# Patient Record
Sex: FEMALE | Race: WHITE
[De-identification: names, ages, dates, MRNs, and addresses within clinical notes are randomized per-mention and may not be internally consistent; named-entity substitution may affect disease eponyms.]

---

## 2017-06-25 ENCOUNTER — HOSPITAL ENCOUNTER (EMERGENCY)
Dept: HOSPITAL 83 - ED | Age: 82
Discharge: HOME | End: 2017-06-25
Payer: MEDICARE

## 2017-06-25 VITALS — DIASTOLIC BLOOD PRESSURE: 62 MMHG

## 2017-06-25 VITALS — HEIGHT: 65 IN | WEIGHT: 185 LBS | BODY MASS INDEX: 30.82 KG/M2

## 2017-06-25 DIAGNOSIS — G89.29: Primary | ICD-10-CM

## 2017-06-25 DIAGNOSIS — I10: ICD-10-CM

## 2017-06-25 DIAGNOSIS — M54.5: ICD-10-CM

## 2017-06-25 DIAGNOSIS — Z79.82: ICD-10-CM

## 2017-06-25 DIAGNOSIS — E78.5: ICD-10-CM

## 2017-06-25 DIAGNOSIS — N39.0: ICD-10-CM

## 2017-06-25 DIAGNOSIS — I71.4: ICD-10-CM

## 2017-06-25 DIAGNOSIS — Z79.899: ICD-10-CM

## 2017-06-25 DIAGNOSIS — R31.9: ICD-10-CM

## 2017-06-25 LAB
ALBUMIN SERPL-MCNC: (no result) G/DL
APPEARANCE UR: (no result)
BACTERIA #/AREA URNS HPF: (no result) /[HPF]
BILIRUB UR QL STRIP: NEGATIVE
COLOR UR: YELLOW
GLUCOSE UR QL: NEGATIVE
HGB UR QL STRIP: (no result)
KETONES UR QL STRIP: NEGATIVE
LEUKOCYTE ESTERASE UR QL STRIP: (no result)
NITRITE UR QL STRIP: NEGATIVE
PH UR STRIP: 5.5 [PH] (ref 5–9)
RBC #/AREA URNS HPF: (no result) RBC/HPF (ref 0–2)
SP GR UR: 1.02 (ref 1–1.03)
URINE REFLEX COMMENT: YES
UROBILINOGEN UR STRIP-MCNC: 1 E.U./DL (ref 0.2–1)
WBC #/AREA URNS HPF: (no result) WBC/HPF (ref 0–5)

## 2017-07-20 ENCOUNTER — HOSPITAL ENCOUNTER (EMERGENCY)
Dept: HOSPITAL 83 - ED | Age: 82
Discharge: HOME | End: 2017-07-20
Payer: MEDICARE

## 2017-07-20 VITALS — SYSTOLIC BLOOD PRESSURE: 145 MMHG | DIASTOLIC BLOOD PRESSURE: 89 MMHG

## 2017-07-20 DIAGNOSIS — I10: ICD-10-CM

## 2017-07-20 DIAGNOSIS — Z79.82: ICD-10-CM

## 2017-07-20 DIAGNOSIS — Z79.899: ICD-10-CM

## 2017-07-20 DIAGNOSIS — E78.5: ICD-10-CM

## 2017-07-20 DIAGNOSIS — M48.54XA: Primary | ICD-10-CM

## 2017-08-24 ENCOUNTER — HOSPITAL ENCOUNTER (OUTPATIENT)
Dept: HOSPITAL 83 - MRI | Age: 82
Discharge: HOME | End: 2017-08-24
Attending: INTERNAL MEDICINE
Payer: MEDICARE

## 2017-08-24 DIAGNOSIS — S22.000D: Primary | ICD-10-CM

## 2017-08-24 DIAGNOSIS — M47.894: ICD-10-CM
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
## 2017-08-24 DIAGNOSIS — M48.04: ICD-10-CM

## 2017-11-04 ENCOUNTER — HOSPITAL ENCOUNTER (EMERGENCY)
Dept: HOSPITAL 83 - ED | Age: 82
Discharge: HOME | End: 2017-11-04
Payer: MEDICARE

## 2017-11-04 VITALS — SYSTOLIC BLOOD PRESSURE: 162 MMHG | DIASTOLIC BLOOD PRESSURE: 80 MMHG

## 2017-11-04 VITALS — WEIGHT: 180 LBS | HEIGHT: 55 IN

## 2017-11-04 DIAGNOSIS — S32.019A: Primary | ICD-10-CM
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
## 2017-11-08 ENCOUNTER — HOSPITAL ENCOUNTER (OUTPATIENT)
Dept: HOSPITAL 83 - MRI | Age: 82
Discharge: HOME | End: 2017-11-08
Attending: NEUROLOGICAL SURGERY
Payer: MEDICARE
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
## 2017-11-08 DIAGNOSIS — S22.080A: ICD-10-CM

## 2017-11-08 DIAGNOSIS — S32.010A: Primary | ICD-10-CM
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
## 2017-11-09 ENCOUNTER — HOSPITAL ENCOUNTER (EMERGENCY)
Dept: HOSPITAL 83 - ED | Age: 82
Discharge: HOME | End: 2017-11-09
Payer: MEDICARE

## 2017-11-09 VITALS — BODY MASS INDEX: 29.99 KG/M2 | WEIGHT: 180 LBS | HEIGHT: 65 IN

## 2017-11-09 VITALS — DIASTOLIC BLOOD PRESSURE: 72 MMHG

## 2017-11-09 DIAGNOSIS — G89.29: Primary | ICD-10-CM

## 2017-11-09 DIAGNOSIS — Z79.82: ICD-10-CM

## 2017-11-09 DIAGNOSIS — Z90.710: ICD-10-CM

## 2017-11-09 DIAGNOSIS — Z79.899: ICD-10-CM

## 2017-11-09 DIAGNOSIS — M54.5: ICD-10-CM

## 2017-11-09 DIAGNOSIS — Z98.890: ICD-10-CM

## 2017-12-31 ENCOUNTER — HOSPITAL ENCOUNTER (EMERGENCY)
Dept: HOSPITAL 83 - ED | Age: 82
Discharge: HOME | End: 2017-12-31
Payer: MEDICARE

## 2017-12-31 VITALS — SYSTOLIC BLOOD PRESSURE: 162 MMHG | DIASTOLIC BLOOD PRESSURE: 71 MMHG

## 2017-12-31 VITALS — WEIGHT: 180 LBS | HEIGHT: 65 IN | BODY MASS INDEX: 29.99 KG/M2

## 2017-12-31 DIAGNOSIS — J20.9: Primary | ICD-10-CM

## 2017-12-31 DIAGNOSIS — Z79.899: ICD-10-CM

## 2017-12-31 DIAGNOSIS — Z79.82: ICD-10-CM

## 2017-12-31 DIAGNOSIS — I10: ICD-10-CM

## 2017-12-31 DIAGNOSIS — G89.29: ICD-10-CM

## 2017-12-31 DIAGNOSIS — N39.0: ICD-10-CM

## 2017-12-31 DIAGNOSIS — E78.5: ICD-10-CM

## 2017-12-31 DIAGNOSIS — E87.6: ICD-10-CM

## 2017-12-31 DIAGNOSIS — F32.9: ICD-10-CM

## 2017-12-31 LAB
ALBUMIN SERPL-MCNC: 2.9 GM/DL (ref 3.1–4.5)
ALP SERPL-CCNC: 51 U/L (ref 45–117)
ALT SERPL W P-5'-P-CCNC: 14 U/L (ref 12–78)
APPEARANCE UR: (no result)
APTT PPP: 29.4 SECONDS (ref 20.8–31.5)
AST SERPL-CCNC: 18 IU/L (ref 3–35)
BACTERIA #/AREA URNS HPF: (no result) /[HPF]
BILIRUB UR QL STRIP: (no result)
BUN SERPL-MCNC: 16 MG/DL (ref 7–24)
CHLORIDE SERPL-SCNC: 105 MMOL/L (ref 98–107)
COLOR UR: YELLOW
CREAT SERPL-MCNC: 0.68 MG/DL (ref 0.55–1.02)
ERYTHROCYTE [DISTWIDTH] IN BLOOD BY AUTOMATED COUNT: 13.2 % (ref 0–14.5)
GLUCOSE UR QL: NEGATIVE
HCT VFR BLD AUTO: 39.6 % (ref 37–47)
HGB BLD-MCNC: 13 G/DL (ref 12–16)
HGB UR QL STRIP: (no result)
INR BLD: 1.8 (ref 2–3.5)
KETONES UR QL STRIP: (no result)
LEUKOCYTE ESTERASE UR QL STRIP: (no result)
LIPASE SERPL-CCNC: 51 U/L (ref 73–393)
MACROCYTES BLD QL SMEAR: SLIGHT
MCH RBC QN AUTO: 32.7 PG (ref 27–31)
MCHC RBC AUTO-ENTMCNC: 32.8 G/DL (ref 33–37)
MCV RBC AUTO: 99.7 FL (ref 81–99)
MUCOUS THREADS URNS QL MICRO: (no result)
NITRITE UR QL STRIP: NEGATIVE
NRBC BLD QL AUTO: 0 10*3/UL (ref 0–0)
OVALOCYTES BLD QL SMEAR: (no result)
PH UR STRIP: 5.5 [PH] (ref 5–9)
PLATELET # BLD AUTO: 260 10*3/UL (ref 130–400)
PLATELET SUFFICIENCY: NORMAL
PMV BLD AUTO: 10.1 FL (ref 9.6–12.3)
POTASSIUM SERPL-SCNC: 3.2 MMOL/L (ref 3.5–5.1)
PROT SERPL-MCNC: 7.3 GM/DL (ref 6.4–8.2)
RBC # BLD AUTO: 3.97 10*6/UL (ref 4.1–5.1)
RBC #/AREA URNS HPF: (no result) RBC/HPF (ref 0–2)
SODIUM SERPL-SCNC: 141 MMOL/L (ref 136–145)
SP GR UR: 1.02 (ref 1–1.03)
T4 FREE SERPL-MCNC: 1.3 NG/DL (ref 0.76–1.46)
TOTAL CELLS COUNTED: 100 #CELLS
TROPONIN I SERPL-MCNC: 0.02 NG/ML (ref ?–0.04)
TSH SERPL DL<=0.005 MIU/L-ACNC: 1.34 UIU/ML (ref 0.36–4.75)
UROBILINOGEN UR STRIP-MCNC: 1 E.U./DL (ref 0.2–1)
VITAMIN B12: 1692 PG/ML (ref 247–911)
WBC #/AREA URNS HPF: (no result) WBC/HPF (ref 0–5)
WBC NRBC COR # BLD AUTO: 8.7 10*3/UL (ref 4.8–10.8)

## 2018-05-19 ENCOUNTER — HOSPITAL ENCOUNTER (EMERGENCY)
Dept: HOSPITAL 83 - ED | Age: 83
Discharge: HOME | End: 2018-05-19
Payer: MEDICARE

## 2018-05-19 VITALS — DIASTOLIC BLOOD PRESSURE: 95 MMHG

## 2018-05-19 VITALS — WEIGHT: 165 LBS | HEIGHT: 65 IN | BODY MASS INDEX: 27.49 KG/M2

## 2018-05-19 DIAGNOSIS — Z79.82: ICD-10-CM

## 2018-05-19 DIAGNOSIS — Z79.899: ICD-10-CM

## 2018-05-19 DIAGNOSIS — Z98.890: ICD-10-CM

## 2018-05-19 DIAGNOSIS — Z90.710: ICD-10-CM

## 2018-05-19 DIAGNOSIS — R31.9: ICD-10-CM

## 2018-05-19 DIAGNOSIS — N39.0: Primary | ICD-10-CM

## 2018-05-19 DIAGNOSIS — R03.0: ICD-10-CM

## 2018-05-19 LAB
APPEARANCE UR: (no result)
BILIRUB UR QL STRIP: NEGATIVE
COLOR UR: YELLOW
GLUCOSE UR QL: NEGATIVE
HGB UR QL STRIP: (no result)
KETONES UR QL STRIP: (no result)
LEUKOCYTE ESTERASE UR QL STRIP: (no result)
NITRITE UR QL STRIP: POSITIVE
PH UR STRIP: 5.5 [PH] (ref 5–9)
SP GR UR: 1.02 (ref 1–1.03)
UROBILINOGEN UR STRIP-MCNC: 1 E.U./DL (ref 0.2–1)
WBC #/AREA URNS HPF: (no result) WBC/HPF (ref 0–5)

## 2018-09-06 ENCOUNTER — OFFICE VISIT (OUTPATIENT)
Dept: CARDIOLOGY CLINIC | Age: 83
End: 2018-09-06
Payer: MEDICARE

## 2018-09-06 VITALS
RESPIRATION RATE: 14 BRPM | BODY MASS INDEX: 27.66 KG/M2 | WEIGHT: 166 LBS | DIASTOLIC BLOOD PRESSURE: 68 MMHG | HEIGHT: 65 IN | SYSTOLIC BLOOD PRESSURE: 108 MMHG | HEART RATE: 60 BPM

## 2018-09-06 DIAGNOSIS — I25.10 CORONARY ARTERY DISEASE INVOLVING NATIVE CORONARY ARTERY OF NATIVE HEART WITHOUT ANGINA PECTORIS: ICD-10-CM

## 2018-09-06 DIAGNOSIS — I50.42 CHRONIC COMBINED SYSTOLIC AND DIASTOLIC CHF (CONGESTIVE HEART FAILURE) (HCC): Primary | ICD-10-CM

## 2018-09-06 PROCEDURE — 1123F ACP DISCUSS/DSCN MKR DOCD: CPT | Performed by: INTERNAL MEDICINE

## 2018-09-06 PROCEDURE — G8419 CALC BMI OUT NRM PARAM NOF/U: HCPCS | Performed by: INTERNAL MEDICINE

## 2018-09-06 PROCEDURE — 1090F PRES/ABSN URINE INCON ASSESS: CPT | Performed by: INTERNAL MEDICINE

## 2018-09-06 PROCEDURE — 1036F TOBACCO NON-USER: CPT | Performed by: INTERNAL MEDICINE

## 2018-09-06 PROCEDURE — 1101F PT FALLS ASSESS-DOCD LE1/YR: CPT | Performed by: INTERNAL MEDICINE

## 2018-09-06 PROCEDURE — G8427 DOCREV CUR MEDS BY ELIG CLIN: HCPCS | Performed by: INTERNAL MEDICINE

## 2018-09-06 PROCEDURE — 4040F PNEUMOC VAC/ADMIN/RCVD: CPT | Performed by: INTERNAL MEDICINE

## 2018-09-06 PROCEDURE — 99214 OFFICE O/P EST MOD 30 MIN: CPT | Performed by: INTERNAL MEDICINE

## 2018-09-06 PROCEDURE — 93000 ELECTROCARDIOGRAM COMPLETE: CPT | Performed by: INTERNAL MEDICINE

## 2018-09-06 PROCEDURE — G8598 ASA/ANTIPLAT THER USED: HCPCS | Performed by: INTERNAL MEDICINE

## 2018-09-06 NOTE — PROGRESS NOTES
10/2/2017.     Review of Systems:  HEENT: negative for acute visual and auditory problems  Constitutional: patient gets fatigued easily, negative for fever and chills  Respiratory: dyspnea with minimal exertion and easy fatigue, negative for cough and hemoptysis  Cardiovascular: negative for chest pain and dyspnea  Gastrointestinal: negative for abdominal pain, diarrhea, nausea and vomiting  Genitourinary: negative for dysuria and hematuria  Derm: negative for rash and skin lesion(s)  Neurological: negative for seizures and tremors  Endocrine: negative for diabetic symptoms including polydipsia and polyuria  Musculoskeletal: negative for CTD, positive for chronic back and leg pain  Psychiatric: negative for anxiety and major depression.     The remainder of the review of systems is negative except as noted above. On exam, she is an elderly white female who is awake, alert and oriented. P: 60 and regular. BP: 108/68. Wt. 166 lbs. BMI: 27.6. Her weight is stable. HEENT is normocephalic and atraumatic. Extraocular muscles are intact. Sclerae are clear. Pupils are equal, round and react to light. The oral mucosa is moist.  Tongue is midline. Her neck is supple. She has no jugular distention. Carotids are full. There are no bruits. She has no neck or supraclavicular masses and no thyromegaly. Respirations are unlabored. Her chest is clear to auscultation and percussion. There was no presacral edema or chest wall tenderness. Her heart had a regular rate and rhythm with a 4th heart sound, but no 3rd heart sound or significant murmur. The PMI was not displaced. There was no precordial heave, lift or thrill. Her abdomen is obese, but otherwise benign without masses, organomegaly or bruits. Extremities showed no edema. Peripheral pulses were palpable in the feet bilaterally. I reviewed her electrocardiogram, which showed normal sinus rhythm at a rate of 60.   She has LVH with secondary ST

## 2019-01-23 ENCOUNTER — HOSPITAL ENCOUNTER (OUTPATIENT)
Dept: GENERAL RADIOLOGY | Age: 84
Discharge: HOME | End: 2019-01-25
Payer: MEDICARE

## 2019-01-23 DIAGNOSIS — N64.52 NIPPLE DISCHARGE: ICD-10-CM

## 2019-01-23 PROCEDURE — 76642 ULTRASOUND BREAST LIMITED: CPT

## 2019-01-23 PROCEDURE — 77066 DX MAMMO INCL CAD BI: CPT

## 2019-02-08 ASSESSMENT — ENCOUNTER SYMPTOMS
SINUS PAIN: 0
TROUBLE SWALLOWING: 0
CHEST TIGHTNESS: 0
SORE THROAT: 0
COUGH: 0
EYE ITCHING: 0
BACK PAIN: 0
SINUS PRESSURE: 0
ABDOMINAL PAIN: 0
ABDOMINAL DISTENTION: 0
VOMITING: 0
DIARRHEA: 0
CHOKING: 0
BLOOD IN STOOL: 0
NAUSEA: 0
WHEEZING: 0
EYE DISCHARGE: 0
VOICE CHANGE: 0
RHINORRHEA: 0
CONSTIPATION: 0

## 2019-02-11 ENCOUNTER — OFFICE VISIT (OUTPATIENT)
Dept: BREAST CENTER | Age: 84
End: 2019-02-11
Payer: MEDICARE

## 2019-02-11 VITALS
HEART RATE: 81 BPM | OXYGEN SATURATION: 97 % | DIASTOLIC BLOOD PRESSURE: 100 MMHG | BODY MASS INDEX: 27.16 KG/M2 | RESPIRATION RATE: 16 BRPM | WEIGHT: 163 LBS | SYSTOLIC BLOOD PRESSURE: 172 MMHG | HEIGHT: 65 IN | TEMPERATURE: 97.9 F

## 2019-02-11 DIAGNOSIS — N64.52 DISCHARGE FROM NIPPLE: ICD-10-CM

## 2019-02-11 PROCEDURE — 1123F ACP DISCUSS/DSCN MKR DOCD: CPT | Performed by: SURGERY

## 2019-02-11 PROCEDURE — 99203 OFFICE O/P NEW LOW 30 MIN: CPT | Performed by: SURGERY

## 2019-02-11 PROCEDURE — 1101F PT FALLS ASSESS-DOCD LE1/YR: CPT | Performed by: SURGERY

## 2019-02-11 PROCEDURE — G8598 ASA/ANTIPLAT THER USED: HCPCS | Performed by: SURGERY

## 2019-02-11 PROCEDURE — G8419 CALC BMI OUT NRM PARAM NOF/U: HCPCS | Performed by: SURGERY

## 2019-02-11 PROCEDURE — G8484 FLU IMMUNIZE NO ADMIN: HCPCS | Performed by: SURGERY

## 2019-02-11 PROCEDURE — 1036F TOBACCO NON-USER: CPT | Performed by: SURGERY

## 2019-02-11 PROCEDURE — G8427 DOCREV CUR MEDS BY ELIG CLIN: HCPCS | Performed by: SURGERY

## 2019-02-11 PROCEDURE — 1090F PRES/ABSN URINE INCON ASSESS: CPT | Performed by: SURGERY

## 2019-02-11 PROCEDURE — 4040F PNEUMOC VAC/ADMIN/RCVD: CPT | Performed by: SURGERY

## 2019-02-11 RX ORDER — NYSTATIN 100000 [USP'U]/G
POWDER TOPICAL
Qty: 60 G | Refills: 3 | Status: SHIPPED | OUTPATIENT
Start: 2019-02-11 | End: 2019-04-30

## 2019-02-11 ASSESSMENT — ENCOUNTER SYMPTOMS: SHORTNESS OF BREATH: 1

## 2019-03-05 RX ORDER — WARFARIN SODIUM 5 MG/1
TABLET ORAL
Refills: 5 | COMMUNITY
Start: 2019-01-17 | End: 2019-09-16 | Stop reason: SDUPTHER

## 2019-03-05 RX ORDER — KETOCONAZOLE 20 MG/G
CREAM TOPICAL
Refills: 0 | COMMUNITY
Start: 2019-01-17 | End: 2019-04-30

## 2019-04-30 ENCOUNTER — OFFICE VISIT (OUTPATIENT)
Dept: CARDIOLOGY CLINIC | Age: 84
End: 2019-04-30
Payer: MEDICARE

## 2019-04-30 VITALS
DIASTOLIC BLOOD PRESSURE: 72 MMHG | HEIGHT: 65 IN | BODY MASS INDEX: 25.99 KG/M2 | RESPIRATION RATE: 18 BRPM | SYSTOLIC BLOOD PRESSURE: 128 MMHG | HEART RATE: 60 BPM | WEIGHT: 156 LBS

## 2019-04-30 DIAGNOSIS — I10 HYPERTENSION, UNSPECIFIED TYPE: Primary | ICD-10-CM

## 2019-04-30 PROCEDURE — G8419 CALC BMI OUT NRM PARAM NOF/U: HCPCS | Performed by: INTERNAL MEDICINE

## 2019-04-30 PROCEDURE — G8598 ASA/ANTIPLAT THER USED: HCPCS | Performed by: INTERNAL MEDICINE

## 2019-04-30 PROCEDURE — 1123F ACP DISCUSS/DSCN MKR DOCD: CPT | Performed by: INTERNAL MEDICINE

## 2019-04-30 PROCEDURE — 4040F PNEUMOC VAC/ADMIN/RCVD: CPT | Performed by: INTERNAL MEDICINE

## 2019-04-30 PROCEDURE — 99214 OFFICE O/P EST MOD 30 MIN: CPT | Performed by: INTERNAL MEDICINE

## 2019-04-30 PROCEDURE — G8427 DOCREV CUR MEDS BY ELIG CLIN: HCPCS | Performed by: INTERNAL MEDICINE

## 2019-04-30 PROCEDURE — 1036F TOBACCO NON-USER: CPT | Performed by: INTERNAL MEDICINE

## 2019-04-30 PROCEDURE — 1090F PRES/ABSN URINE INCON ASSESS: CPT | Performed by: INTERNAL MEDICINE

## 2019-04-30 PROCEDURE — 93000 ELECTROCARDIOGRAM COMPLETE: CPT | Performed by: INTERNAL MEDICINE

## 2019-04-30 RX ORDER — OXYCODONE AND ACETAMINOPHEN 10; 325 MG/1; MG/1
1 TABLET ORAL EVERY 4 HOURS PRN
COMMUNITY
End: 2019-05-29 | Stop reason: SDUPTHER

## 2019-04-30 RX ORDER — OXYCODONE AND ACETAMINOPHEN 10; 325 MG/1; MG/1
1 TABLET ORAL EVERY 4 HOURS PRN
COMMUNITY
End: 2019-04-30 | Stop reason: SDUPTHER

## 2019-04-30 NOTE — PROGRESS NOTES
CHIEF COMPLAINT: CHF    HISTORY OF PRESENT ILLNESS: Patient is a 80 y.o. female seen at the request of Sage Padilla MD.      Baseline SOB. No CP. Medical history includes:   1. CHF due to LV systolic and diastolic dysfunction. 2. Essential hypertension. 3. Hyperlipidemia. 4. Small abdominal aortic aneurysm documented by CT scan, 02/25/2013, measuring approximately 3 cm in diameter.    5. 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 6. Holter monitor, 10/22/2015. Sinus bradycardia. Occasional ventricular and supraventricular ectopy, but no AF documented.    7. TENISHA, 11/04/2015. Global LV hypokinesis with mild systolic dysfunction. EF 40-45%. Stage II diastolic dysfunction. Dilated LA with spontaneous echo contrast within the atrial cavity and a thrombus within the left atrial appendage. MAC with mild mitral insufficiency. Aortic sclerosis without stenosis, but with mild aortic insufficiency. Mild tricuspid insufficiency with Doppler evidence for severe elevation in RV systolic pressures. Moderate atherosclerosis of the thoracic aorta, but no mobile atheromata present.    8. Holter monitor, [facility], 02/23/2016, sinus rhythm with frequent isolated PVC's. Average heart rate 64 with heart rate varying from 51 to 82 beats per minute. No prolonged pauses. No symptoms listed. 9. Kyphoplasty by Dr. Andi Smart, 10/2/2017.     Past Medical History:   Diagnosis Date    AAA (abdominal aortic aneurysm) ([address]     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)     CAD (coronary artery disease)     CHF (congestive heart failure) (HCC)     HTN (hypertension)     Hx of blood clots 2015    right upper arm    Hyperlipemia        Patient Active Problem List   Diagnosis    CHF (congestive heart failure) ([address]    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 ([address]    HTN (hypertension)    Hyperlipemia    AAA (abdominal aortic aneurysm) ([address]    CAD (coronary artery disease)    T12 compression fracture (HCC)    Compression fracture of body of thoracic vertebra (HCC)       No Known Allergies    Current Outpatient Medications   Medication Sig Dispense Refill    warfarin (COUMADIN) 5 MG tablet TAKE AS DIRECTED BY YOUR PRESCRIBER COUMADIN  5    oxyCODONE-acetaminophen (PERCOCET) 5-325 MG per tablet Take 1 tablet by mouth every 4 hours as needed for Pain  Take morning of surgery with a sip of water. 120 tablet 0    acetaminophen (TYLENOL ARTHRITIS PAIN) 650 MG extended release tablet Take 650 mg by mouth every 8 hours as needed for Pain      potassium chloride (KLOR-CON) 20 MEQ packet Take 20 mEq by mouth 2 times daily      lisinopril (PRINIVIL;ZESTRIL) 10 MG tablet Take 1 tablet by mouth every evening (Patient taking differently: Take 10 mg by mouth every evening Take morning of surgery with a sip of water) 30 tablet 3    atorvastatin (LIPITOR) 40 MG tablet Take 40 mg by mouth daily       vitamin B-12 (CYANOCOBALAMIN) 500 MCG tablet Take 500 mcg by mouth daily Ld 9-12-17      furosemide (LASIX) 20 MG tablet Take 20 mg by mouth See Admin Instructions       isosorbide mononitrate (IMDUR) 30 MG CR tablet Take 30 mg by mouth daily Take morning of surgery with a sip of water       No current facility-administered medications for this visit.         Social History     Socioeconomic History    Marital status:      Spouse name: Not on file    Number of children: Not on file    Years of education: Not on file    Highest education level: Not on file   Occupational History    Not on file   Social Needs    Financial resource strain: Not on file    Food insecurity:     Worry: Not on file     Inability: Not on file    Transportation needs:     Medical: Not on file     Non-medical: Not on file   Tobacco Use    Smoking status: Former Smoker    Smokeless tobacco: Never Used    Tobacco comment: quit about 30 yrs ago   Substance and Sexual Activity    distal pulses. Exam reveals no gallop and no friction rub. No murmur heard. Pulmonary/Chest: Effort normal and breath sounds normal. No respiratory distress. No wheezes. No rales. No tenderness. Abdominal: Soft. Bowel sounds are normal. No distension and no mass. No tenderness. No rebound and no guarding. Musculoskeletal: Normal range of motion. No edema and no tenderness. Lymphadenopathy:   No cervical adenopathy. No groin adenopathy. Neurological: Alert and oriented to person, place, and time. Skin: Skin is warm and dry. No rash noted. Not diaphoretic. No erythema. Psychiatric: Normal mood and affect. Behavior is normal.     EKG:  normal sinus rhythm, nonspecific ST and T waves changes. ASSESSMENT AND PLAN:  Patient Active Problem List   Diagnosis    CHF (congestive heart failure) ([address]    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)    HTN (hypertension)    Hyperlipemia    AAA (abdominal aortic aneurysm) ([address]    CAD (coronary artery disease)    T12 compression fracture ([address]    Compression fracture of body of thoracic vertebra ([address]     1. CHF: ACEI/lasix/imdur. 2. HTN: Observe. 3. Lipids: Statin. 4. Hx of arterial embolism    Son Iyer D.O.   Cardiologist  Cardiology, [facility]

## 2019-05-29 ENCOUNTER — ANTI-COAG VISIT (OUTPATIENT)
Dept: FAMILY MEDICINE CLINIC | Age: 84
End: 2019-05-29

## 2019-05-29 ENCOUNTER — HOSPITAL ENCOUNTER (OUTPATIENT)
Age: 84
Discharge: HOME | End: 2019-05-31
Payer: MEDICARE

## 2019-05-29 ENCOUNTER — OFFICE VISIT (OUTPATIENT)
Dept: FAMILY MEDICINE CLINIC | Age: 84
End: 2019-05-29
Payer: MEDICARE

## 2019-05-29 VITALS
HEIGHT: 65 IN | BODY MASS INDEX: 24.83 KG/M2 | SYSTOLIC BLOOD PRESSURE: 126 MMHG | WEIGHT: 149 LBS | DIASTOLIC BLOOD PRESSURE: 66 MMHG

## 2019-05-29 DIAGNOSIS — E78.5 HYPERLIPIDEMIA, UNSPECIFIED HYPERLIPIDEMIA TYPE: ICD-10-CM

## 2019-05-29 DIAGNOSIS — Z79.01 ANTICOAGULANT LONG-TERM USE: ICD-10-CM

## 2019-05-29 DIAGNOSIS — I10 ESSENTIAL HYPERTENSION: ICD-10-CM

## 2019-05-29 DIAGNOSIS — Z79.01 ANTICOAGULATED ON COUMADIN: Primary | ICD-10-CM

## 2019-05-29 DIAGNOSIS — G89.4 CHRONIC PAIN SYNDROME: ICD-10-CM

## 2019-05-29 LAB
ALBUMIN SERPL-MCNC: 4 G/DL (ref 3.5–5.2)
ALP BLD-CCNC: 61 U/L (ref 35–104)
ALT SERPL-CCNC: 17 U/L (ref 0–32)
ANION GAP SERPL CALCULATED.3IONS-SCNC: 14 MMOL/L (ref 7–16)
AST SERPL-CCNC: 26 U/L (ref 0–31)
BASOPHILS ABSOLUTE: 0.05 E9/L (ref 0–0.2)
BASOPHILS RELATIVE PERCENT: 0.5 % (ref 0–2)
BILIRUB SERPL-MCNC: 0.7 MG/DL (ref 0–1.2)
BUN BLDV-MCNC: 20 MG/DL (ref 8–23)
CALCIUM SERPL-MCNC: 10 MG/DL (ref 8.6–10.2)
CHLORIDE BLD-SCNC: 104 MMOL/L (ref 98–107)
CHOLESTEROL, TOTAL: 136 MG/DL (ref 0–199)
CO2: 25 MMOL/L (ref 22–29)
CREAT SERPL-MCNC: 0.8 MG/DL (ref 0.5–1)
EOSINOPHILS ABSOLUTE: 0.1 E9/L (ref 0.05–0.5)
EOSINOPHILS RELATIVE PERCENT: 1 % (ref 0–6)
GFR AFRICAN AMERICAN: >60
GFR NON-AFRICAN AMERICAN: >60 ML/MIN/1.73
GLUCOSE BLD-MCNC: 96 MG/DL (ref 74–99)
HCT VFR BLD CALC: 47.2 % (ref 34–48)
HDLC SERPL-MCNC: 39 MG/DL
HEMOGLOBIN: 14.5 G/DL (ref 11.5–15.5)
IMMATURE GRANULOCYTES #: 0.06 E9/L
IMMATURE GRANULOCYTES %: 0.6 % (ref 0–5)
INTERNATIONAL NORMALIZATION RATIO, POC: 2.2
LDL CHOLESTEROL CALCULATED: 68 MG/DL (ref 0–99)
LYMPHOCYTES ABSOLUTE: 2.14 E9/L (ref 1.5–4)
LYMPHOCYTES RELATIVE PERCENT: 21.5 % (ref 20–42)
MCH RBC QN AUTO: 32.9 PG (ref 26–35)
MCHC RBC AUTO-ENTMCNC: 30.7 % (ref 32–34.5)
MCV RBC AUTO: 107 FL (ref 80–99.9)
MONOCYTES ABSOLUTE: 0.77 E9/L (ref 0.1–0.95)
MONOCYTES RELATIVE PERCENT: 7.7 % (ref 2–12)
NEUTROPHILS ABSOLUTE: 6.83 E9/L (ref 1.8–7.3)
NEUTROPHILS RELATIVE PERCENT: 68.7 % (ref 43–80)
PDW BLD-RTO: 14 FL (ref 11.5–15)
PLATELET # BLD: 226 E9/L (ref 130–450)
PMV BLD AUTO: 11.6 FL (ref 7–12)
POTASSIUM SERPL-SCNC: 5 MMOL/L (ref 3.5–5)
PROTHROMBIN TIME, POC: 26.2
RBC # BLD: 4.41 E12/L (ref 3.5–5.5)
SODIUM BLD-SCNC: 143 MMOL/L (ref 132–146)
TOTAL PROTEIN: 7.9 G/DL (ref 6.4–8.3)
TRIGL SERPL-MCNC: 146 MG/DL (ref 0–149)
VLDLC SERPL CALC-MCNC: 29 MG/DL
WBC # BLD: 10 E9/L (ref 4.5–11.5)

## 2019-05-29 PROCEDURE — 99214 OFFICE O/P EST MOD 30 MIN: CPT | Performed by: INTERNAL MEDICINE

## 2019-05-29 PROCEDURE — 80061 LIPID PANEL: CPT

## 2019-05-29 PROCEDURE — 85610 PROTHROMBIN TIME: CPT | Performed by: INTERNAL MEDICINE

## 2019-05-29 PROCEDURE — 80053 COMPREHEN METABOLIC PANEL: CPT

## 2019-05-29 PROCEDURE — 36415 COLL VENOUS BLD VENIPUNCTURE: CPT

## 2019-05-29 PROCEDURE — 85025 COMPLETE CBC W/AUTO DIFF WBC: CPT

## 2019-05-29 RX ORDER — OXYCODONE AND ACETAMINOPHEN 10; 325 MG/1; MG/1
1 TABLET ORAL EVERY 8 HOURS PRN
Qty: 90 TABLET | Refills: 0 | Status: SHIPPED | OUTPATIENT
Start: 2019-05-29 | End: 2019-06-27 | Stop reason: SDUPTHER

## 2019-05-29 ASSESSMENT — ENCOUNTER SYMPTOMS
COUGH: 0
DIARRHEA: 0
WHEEZING: 0
RHINORRHEA: 0
VOMITING: 0
CONSTIPATION: 1
SHORTNESS OF BREATH: 1
BLOOD IN STOOL: 0
SINUS PAIN: 0
NAUSEA: 0
ABDOMINAL PAIN: 0
BACK PAIN: 0

## 2019-05-31 ENCOUNTER — TELEPHONE (OUTPATIENT)
Dept: FAMILY MEDICINE CLINIC | Age: 84
End: 2019-05-31

## 2019-06-05 ENCOUNTER — TELEPHONE (OUTPATIENT)
Dept: FAMILY MEDICINE CLINIC | Age: 84
End: 2019-06-05

## 2019-06-27 ENCOUNTER — TELEPHONE (OUTPATIENT)
Dept: FAMILY MEDICINE CLINIC | Age: 84
End: 2019-06-27

## 2019-06-27 ENCOUNTER — OFFICE VISIT (OUTPATIENT)
Dept: FAMILY MEDICINE CLINIC | Age: 84
End: 2019-06-27
Payer: MEDICARE

## 2019-06-27 VITALS
WEIGHT: 148 LBS | SYSTOLIC BLOOD PRESSURE: 128 MMHG | DIASTOLIC BLOOD PRESSURE: 72 MMHG | OXYGEN SATURATION: 90 % | BODY MASS INDEX: 24.66 KG/M2 | HEIGHT: 65 IN | TEMPERATURE: 98.1 F | HEART RATE: 63 BPM

## 2019-06-27 DIAGNOSIS — I71.40 ABDOMINAL AORTIC ANEURYSM (AAA) WITHOUT RUPTURE: ICD-10-CM

## 2019-06-27 DIAGNOSIS — R63.4 WEIGHT LOSS: ICD-10-CM

## 2019-06-27 DIAGNOSIS — G89.4 CHRONIC PAIN SYNDROME: ICD-10-CM

## 2019-06-27 DIAGNOSIS — I48.91 ATRIAL FIBRILLATION, UNSPECIFIED TYPE (HCC): Primary | ICD-10-CM

## 2019-06-27 DIAGNOSIS — I10 ESSENTIAL HYPERTENSION: ICD-10-CM

## 2019-06-27 DIAGNOSIS — F32.9 CURRENT EPISODE OF MAJOR DEPRESSIVE DISORDER WITHOUT PRIOR EPISODE, UNSPECIFIED DEPRESSION EPISODE SEVERITY: ICD-10-CM

## 2019-06-27 DIAGNOSIS — I74.2 ARTERIAL EMBOLISM AND THROMBOSIS OF UPPER EXTREMITY (HCC): ICD-10-CM

## 2019-06-27 LAB
INTERNATIONAL NORMALIZATION RATIO, POC: 4.4
PROTHROMBIN TIME, POC: 52.6

## 2019-06-27 PROCEDURE — 99215 OFFICE O/P EST HI 40 MIN: CPT | Performed by: INTERNAL MEDICINE

## 2019-06-27 PROCEDURE — 85610 PROTHROMBIN TIME: CPT | Performed by: INTERNAL MEDICINE

## 2019-06-27 RX ORDER — OXYCODONE AND ACETAMINOPHEN 10; 325 MG/1; MG/1
1 TABLET ORAL 3 TIMES DAILY PRN
Qty: 90 TABLET | Refills: 0 | Status: SHIPPED | OUTPATIENT
Start: 2019-06-27 | End: 2019-07-22 | Stop reason: SDUPTHER

## 2019-06-27 RX ORDER — FLUOXETINE 20 MG/1
TABLET, FILM COATED ORAL
Qty: 30 TABLET | Refills: 3 | Status: SHIPPED | OUTPATIENT
Start: 2019-06-27 | End: 2019-08-12 | Stop reason: SDUPTHER

## 2019-06-27 RX ORDER — OXYCODONE AND ACETAMINOPHEN 10; 325 MG/1; MG/1
1 TABLET ORAL 3 TIMES DAILY PRN
Qty: 90 TABLET | Refills: 0 | Status: SHIPPED | OUTPATIENT
Start: 2019-06-27 | End: 2019-06-27 | Stop reason: SDUPTHER

## 2019-06-27 ASSESSMENT — PATIENT HEALTH QUESTIONNAIRE - PHQ9: DEPRESSION UNABLE TO ASSESS: PT REFUSES

## 2019-06-27 NOTE — TELEPHONE ENCOUNTER
Pt in office she has a bill from [employer]#[id number]. Per [employer] rep holly The lab [place] 27.29 was not covered no dx. I added D05.00. They will rebill insurance and send a new bill to pt.  Pt informed in office

## 2019-06-28 NOTE — PROGRESS NOTES
[facility]     19  Guadalupe Jiménez : 3/1/1932 Sex: female  Age: 80 y.o. Chief Complaint   Patient presents with    COPD    Hyperlipidemia   Multiple medical problem follow-up    HPI: Presents for follow-up visit today on multiple medical problems. Another extensive visit today. She is also here for request of narcotic refill of her go down and to have anticoagulation visit/check. She is on chronic Coumadin related to thrombosis. And history of paroxysmal atrial fibrillation. She is  up-to-date with cardiology and saw urology for microscopic hematuria and leukocytes in the urine. She was placed on  Myrbetric and Estrace cream.myrbetric was  Recently increased to 50 mg. she states this is not  working. She missed her last appointment because she  was in the hospital. I told her she needs to reschedule. She did have CT urogram and cystoscopy performed as well . 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 She is having no abdominal pain. She saw Dr. Jim Espino surgeon related to  issues of nipple inversion and discharge. She continues to be down and her weight is down 1 more pound since I saw her last since the beginning of the year down about 20 pounds. I told her last visit if continued would need to consider further work-up and today I told her I would repeat some blood work CAT scan of the abdomen and pelvis. give her fit test.  She did have colonoscopy in 2015. Overall she states she feels well. NR today was 4.4. No change in diet or medication. Review of Systems    Constitutional: Positive for unexpected weight change. Negative for activity change, appetite change, chills, diaphoresis, fatigue and fever. HENT: Negative for congestion, ear pain, hearing loss, postnasal drip, rhinorrhea and sinus pain. Respiratory: Positive for shortness of breath. Negative for cough and wheezing.          COPD chronic oxygen use   Cardiovascular: Negative for chest pain, palpitations and leg swelling. Gastrointestinal: Positive for constipation. Negative for abdominal pain, blood in stool, diarrhea, nausea and vomiting. Improving post hospitalization   Endocrine: Negative. Genitourinary: Negative for difficulty urinating, dysuria, hematuria and urgency. Reports frequent UTIs .does have some incontinence of urine following with urology   Musculoskeletal: Positive for arthralgias. back pain, and gait problem       Chronic pain syndrome/back pain--lower back pain and bilateral hip pain/chronic-continues on narcotic pain medication with success. T11-L1 vertebroplasty. Successful. Skin: Negative. Neurological: Negative for dizziness, weakness, light-headedness, numbness and headaches. Hematological: Negative. Psychiatric/Behavioral: Admits to being more emotional and angry since taken off of her Prozac. REST OF PERTINENT ROS GONE OVER AND WAS NEGATIVE.    PMH:  Problem List: Chronic pain syndrome, Long-term current use of anticoagulant, Anticoagulant, Osteoarthritis, Taking  medication, Hyperlipidemia  Health Maintenance:  Mammogram - (1/23/2019)  Mammogram Screening - (1/23/2019)  Influenza Vaccination - (11/21/2018)  Bone Density Scan - (1/11/2011)  Colonoscopy - (11/12/2015)  Couseled on Home Safety - (4/12/2017)  Bone Density Test Screening - (5/23/2014)  Stress Test - 1/08-ok,3/11  EKG - 7/09,11,3/13  Rectal Exam - 8/10  Breast Exam - 8/10,declined,2/14  Hemmocult Cards - 2/13-neg x 3  2D ECHO - 8/15  Mini Mental Status - 4/19--29/30  Medical Problems:  Osteoarthritis  DVT - right-1/04  Hypertension  Lumbar Spondylosis-Herniated Disc-Spinal Stenosis - epidurals/[place]  Hyperlipidemia, YAIR-BSO--Non Cancerous  Small AAA - 3.4cm--- 2/19.--3.5 cm per CT urogram  HX FX Left Ankle, Chronic Greater Trochanteric Bursitis, Depression, Cataracts  Coronary Artery Disease (CAD) - Previously declined cardiology fu  neurogenic claudication  Spinal Stenosis - L2-5 lumbar laminectomy-dr massey,[facility]  Kidney Stones, Anxiety, Diverticulitis, Osteoporosis, post laminectomy syndrome lumbar spine  Goiter - multinodular  hematuria - cystoscopy/retrograde-worked up by urology. urethral stenosis - dialated  chronic urethral inflammation, strep bovis bacteremia, Congestive Heart Failure (CHF), arterial thrombosis right arm  T12 compression fracture. - Vertebroplasty  Lumbar Spondylosis-Herniated Disc-Spinal Stenosis  T11 and L1 kyphoplasty -   Dilated nonischemic cardiomyopathy - Follows with cardiology  copd  Reviewed and updated. FH:  Father:  MI -  late 63's. Mother:  Breast Cancer - ? ?  AGE 80. Son 1:  Chronic Obstructive Pulmonary Disease (COPD) -  age 61. Sister 1:  melanoma -  age in her 52's. Reviewed, no changes. SH:  Marital: . Personal Habits: Cigarette Use: Does Not Smoke. Alcohol: does not use alcohol. Reviewed, no changes. Current Outpatient Medications:     FLUoxetine (PROZAC) 20 MG tablet, 1/2 pill po daily, Disp: 30 tablet, Rfl: 3    oxyCODONE-acetaminophen (PERCOCET)  MG per tablet, Take 1 tablet by mouth 3 times daily as needed for Pain for up to 30 days. , Disp: 90 tablet, Rfl: 0    warfarin (COUMADIN) 5 MG tablet, TAKE AS DIRECTED BY YOUR PRESCRIBER COUMADIN, Disp: , Rfl: 5    acetaminophen (TYLENOL ARTHRITIS PAIN) 650 MG extended release tablet, Take 650 mg by mouth every 8 hours as needed for Pain, Disp: , Rfl:     potassium chloride (KLOR-CON) 20 MEQ packet, Take 20 mEq by mouth daily , Disp: , Rfl:     lisinopril (PRINIVIL;ZESTRIL) 10 MG tablet, Take 1 tablet by mouth every evening (Patient taking differently: Take 10 mg by mouth daily Take morning of surgery with a sip of water), Disp: 30 tablet, Rfl: 3    atorvastatin (LIPITOR) 40 MG tablet, Take 40 mg by mouth daily , Disp: , Rfl:     vitamin B-12 (CYANOCOBALAMIN) 500 MCG tablet, Take 1,000 mcg by mouth daily Ld 17, Disp: , Rfl:     furosemide (LASIX) 20 MG tablet, Take 20 mg by mouth See Admin Instructions , Disp: , Rfl:     isosorbide mononitrate (IMDUR) 30 MG CR tablet, Take 30 mg by mouth daily Take morning of surgery with a sip of water, Disp: , Rfl:   No Known Allergies    Past Medical History:   Diagnosis Date    AAA (abdominal aortic [facility]) [facility]     Anticoagulant long-term use 5/29/2019    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     CAD (coronary artery disease)     CHF (congestive heart failure) ([facility]     Chronic pain syndrome 5/29/2019    HTN (hypertension)     Hx of blood clots 2015    right upper arm    Hyperlipemia      Past Surgical History:   Procedure Laterality Date    BACK SURGERY  2010    [facility] for sciatica, pt unsure    COLONOSCOPY      EYE SURGERY Bilateral     cataracts    FIXATION KYPHOPLASTY  10/02/2017    T12 kyphoplasty Dr Elen Dean KYPHOPLASTY  11/20/2017    T11-L1 kypho    HYSTERECTOMY      HYSTERECTOMY, TOTAL ABDOMINAL      TONSILLECTOMY      TRANSESOPHAGEAL ECHOCARDIOGRAM  11/04/15     Family History   Problem Relation Age of Onset    Breast Cancer Mother [de-identified]    Heart Disease Father     Cancer Sister         melanoma     Social History     Socioeconomic History    Marital status:      Spouse name: Not on file    Number of children: Not on file    Years of education: Not on file    Highest education level: Not on file   Occupational History    Not on file   Social Needs    Financial resource strain: Not on file    Food insecurity:     Worry: Not on file     Inability: Not on file    Transportation needs:     Medical: Not on file     Non-medical: Not on file   Tobacco Use    Smoking status: Former Smoker    Smokeless tobacco: Never Used    Tobacco comment: quit about 30 yrs ago   Substance and Sexual Activity    Alcohol use: No    Drug use: No    Sexual activity: Not on file   Lifestyle    Physical activity:     Days per week: Not on file     Minutes per affect is appropriate. Judgement is realistic. Insight is appropriate. Neurologically grossly intact without focal deficits noted. Assessment and Plan:  Parul Thurman was seen today for copd and hyperlipidemia. Diagnoses and all orders for this visit:    Atrial fibrillation, unspecified type ([facility]  -     POCT INR    Chronic pain syndrome  -     Discontinue: oxyCODONE-acetaminophen (PERCOCET)  MG per tablet; Take 1 tablet by mouth 3 times daily as needed for Pain for up to 30 days. -     oxyCODONE-acetaminophen (PERCOCET)  MG per tablet; Take 1 tablet by mouth 3 times daily as needed for Pain for up to 30 days. Weight loss  -     CBC Auto Differential; Future  -     TSH without Reflex; Future  -     CTA ABDOMEN PELVIS W CONTRAST; Future  -     POCT Fit Test; Future    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)    Abdominal aortic aneurysm (AAA) without rupture (HCC)    Essential hypertension  -     VITAMIN B12 & FOLATE; Future    Current episode of major depressive disorder without prior episode, unspecified depression episode severity  -     FLUoxetine (PROZAC) 20 MG tablet; 1/2 pill po daily    Plan: With her current weight loss is unexplained at this point I elected to get CAT scan abdomen and pelvis with contrast as well as give her a fit test and repeat labs. She did have recent chest x-ray mammogram colonoscopy about 4 years ago. Regular blood work. I did restart her Prozac per her request because of her mood changes. She felt much better on the medication. It was stopped because of some suspicion this may have been a contributing factor to her nipple discharge when she saw Dr. Hue Salazar. Narcotic prescription filled. Oarrs report was run and okay. No evidence of abuse or diversion of the medication. INR today was addressed. She will hold for 2 days then go to 5 mg Monday through Wednesday and 2.5 mg Thursday through Sunday. Repeat INR in 2 weeks and follow-up then.   Blood work to monitor disease progression and medication use. Notify us with problems in the interim. Fall precautions. Monitor weight. This was an extended visit lasting greater than 40 minutes with greater than 50% of that time spent face-to-face in counseling and coordination of care and answering questions. Return in about 1 month (around 7/27/2019). Seen By:  Yohan Larsen MD      *Document was created using voice recognition software. Note was reviewed however may contain grammatical errors.

## 2019-07-02 ENCOUNTER — TELEPHONE (OUTPATIENT)
Dept: PRIMARY CARE CLINIC | Age: 84
End: 2019-07-02

## 2019-07-02 DIAGNOSIS — R63.4 WEIGHT LOSS: Primary | ICD-10-CM

## 2019-07-02 NOTE — TELEPHONE ENCOUNTER
Patient has an appt tomorrow for cta abd pelvis. [facility] just wanted to make sure it is suppose to be a cta due to diagnosis. They wanted to make sure it wasn't suppose to be just a CT abd pelvis w contrast.    Please advise thank you.

## 2019-07-18 ENCOUNTER — TELEPHONE (OUTPATIENT)
Dept: FAMILY MEDICINE CLINIC | Age: 84
End: 2019-07-18

## 2019-07-18 DIAGNOSIS — R63.4 WEIGHT LOSS: ICD-10-CM

## 2019-07-22 ENCOUNTER — TELEPHONE (OUTPATIENT)
Dept: ADMINISTRATIVE | Age: 84
End: 2019-07-22

## 2019-07-22 DIAGNOSIS — G89.4 CHRONIC PAIN SYNDROME: ICD-10-CM

## 2019-07-22 DIAGNOSIS — S22.000A COMPRESSION FRACTURE OF BODY OF THORACIC VERTEBRA (HCC): Primary | ICD-10-CM

## 2019-07-22 RX ORDER — OXYCODONE AND ACETAMINOPHEN 10; 325 MG/1; MG/1
1 TABLET ORAL 3 TIMES DAILY PRN
Qty: 90 TABLET | Refills: 0 | Status: SHIPPED | OUTPATIENT
Start: 2019-07-22 | End: 2019-08-12 | Stop reason: SDUPTHER

## 2019-07-22 NOTE — TELEPHONE ENCOUNTER
Per patient she will be out of her percocet 10-325mg 1 tab 3times a day on 07/28/19. Her next appt with dr Maribell Rios is 08/12/19. Are you willing to give her a 15 day supply?  That would be #45

## 2019-07-31 ENCOUNTER — TELEPHONE (OUTPATIENT)
Dept: FAMILY MEDICINE CLINIC | Age: 84
End: 2019-07-31

## 2019-08-12 ENCOUNTER — OFFICE VISIT (OUTPATIENT)
Dept: FAMILY MEDICINE CLINIC | Age: 84
End: 2019-08-12
Payer: MEDICARE

## 2019-08-12 VITALS
WEIGHT: 151 LBS | DIASTOLIC BLOOD PRESSURE: 64 MMHG | SYSTOLIC BLOOD PRESSURE: 108 MMHG | HEART RATE: 66 BPM | OXYGEN SATURATION: 90 % | HEIGHT: 65 IN | BODY MASS INDEX: 25.16 KG/M2

## 2019-08-12 DIAGNOSIS — G89.4 CHRONIC PAIN SYNDROME: ICD-10-CM

## 2019-08-12 DIAGNOSIS — S22.000A COMPRESSION FRACTURE OF BODY OF THORACIC VERTEBRA (HCC): ICD-10-CM

## 2019-08-12 DIAGNOSIS — I48.91 ATRIAL FIBRILLATION, UNSPECIFIED TYPE (HCC): Primary | ICD-10-CM

## 2019-08-12 DIAGNOSIS — F32.9 CURRENT EPISODE OF MAJOR DEPRESSIVE DISORDER WITHOUT PRIOR EPISODE, UNSPECIFIED DEPRESSION EPISODE SEVERITY: ICD-10-CM

## 2019-08-12 DIAGNOSIS — Z79.01 ANTICOAGULANT LONG-TERM USE: ICD-10-CM

## 2019-08-12 DIAGNOSIS — F11.90 CHRONIC, CONTINUOUS USE OF OPIOIDS: ICD-10-CM

## 2019-08-12 LAB
INTERNATIONAL NORMALIZATION RATIO, POC: 1.5
PROTHROMBIN TIME, POC: 17.9

## 2019-08-12 PROCEDURE — 93793 ANTICOAG MGMT PT WARFARIN: CPT | Performed by: INTERNAL MEDICINE

## 2019-08-12 PROCEDURE — 99214 OFFICE O/P EST MOD 30 MIN: CPT | Performed by: INTERNAL MEDICINE

## 2019-08-12 PROCEDURE — 85610 PROTHROMBIN TIME: CPT | Performed by: INTERNAL MEDICINE

## 2019-08-12 RX ORDER — FLUOXETINE 20 MG/1
TABLET, FILM COATED ORAL
Qty: 15 TABLET | Refills: 3 | Status: SHIPPED | OUTPATIENT
Start: 2019-08-12 | End: 2019-11-25 | Stop reason: SDUPTHER

## 2019-08-12 RX ORDER — OXYCODONE AND ACETAMINOPHEN 10; 325 MG/1; MG/1
1 TABLET ORAL 3 TIMES DAILY PRN
Qty: 90 TABLET | Refills: 0 | Status: SHIPPED | OUTPATIENT
Start: 2019-08-12 | End: 2019-09-16 | Stop reason: SDUPTHER

## 2019-08-12 RX ORDER — POTASSIUM CHLORIDE 1.5 G/1.77G
20 POWDER, FOR SOLUTION ORAL DAILY
Qty: 30 EACH | Refills: 2 | Status: SHIPPED | OUTPATIENT
Start: 2019-08-12 | End: 2019-09-16 | Stop reason: SDUPTHER

## 2019-08-12 NOTE — PROGRESS NOTES
Spondylosis-Herniated Disc-Spinal Stenosis  T11 and L1 kyphoplasty -   Dilated nonischemic cardiomyopathy - Follows with cardiology  copd  Reviewed and updated. FH:  Father:  MI -  late 63's. Mother:  Breast Cancer - ? ?  AGE 80. Son 1:  Chronic Obstructive Pulmonary Disease (COPD) -  age 61. Sister 1:  melanoma -  age in her 52's. Reviewed, no changes. SH:  Marital: . Personal Habits: Cigarette Use: Does Not Smoke. Alcohol: does not use alcohol. Reviewed, no changes. Current Outpatient Medications:     potassium chloride (KLOR-CON) 20 MEQ packet, Take 20 mEq by mouth daily, Disp: 30 each, Rfl: 2    FLUoxetine (PROZAC) 20 MG tablet, 1/2 pill po daily, Disp: 15 tablet, Rfl: 3    oxyCODONE-acetaminophen (PERCOCET)  MG per tablet, Take 1 tablet by mouth 3 times daily as needed for Pain for up to 30 days. , Disp: 90 tablet, Rfl: 0    warfarin (COUMADIN) 5 MG tablet, TAKE AS DIRECTED BY YOUR PRESCRIBER COUMADIN, Disp: , Rfl: 5    lisinopril (PRINIVIL;ZESTRIL) 10 MG tablet, Take 1 tablet by mouth every evening (Patient taking differently: Take 10 mg by mouth daily Take morning of surgery with a sip of water), Disp: 30 tablet, Rfl: 3    atorvastatin (LIPITOR) 40 MG tablet, Take 40 mg by mouth daily , Disp: , Rfl:     vitamin B-12 (CYANOCOBALAMIN) 500 MCG tablet, Take 1,000 mcg by mouth daily Ld 17, Disp: , Rfl:     furosemide (LASIX) 20 MG tablet, Take 20 mg by mouth See Admin Instructions , Disp: , Rfl:     isosorbide mononitrate (IMDUR) 30 MG CR tablet, Take 30 mg by mouth daily Take morning of surgery with a sip of water, Disp: , Rfl:   No Known Allergies    Past Medical History:   Diagnosis Date    [facility]ab[facility]) [facility]     Anticoagulant long-term use 2019    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 ([facility]     CAD (coronary artery disease)     CHF (congestive heart failure) (HCC)     Chronic pain syndrome

## 2019-08-13 ENCOUNTER — TELEPHONE (OUTPATIENT)
Dept: ADMINISTRATIVE | Age: 84
End: 2019-08-13

## 2019-08-13 DIAGNOSIS — G89.4 CHRONIC PAIN SYNDROME: Primary | ICD-10-CM

## 2019-08-13 NOTE — TELEPHONE ENCOUNTER
Pt called and would like to come in the office to  her script for Percocet  MG. She stated she left without it at her appt yesterday 08/12. She stated she will be u=in [place] this afternoon.

## 2019-08-14 RX ORDER — OXYCODONE AND ACETAMINOPHEN 10; 325 MG/1; MG/1
1 TABLET ORAL 3 TIMES DAILY
Qty: 90 TABLET | Refills: 0 | Status: SHIPPED | OUTPATIENT
Start: 2019-08-14 | End: 2019-08-14

## 2019-08-14 RX ORDER — OXYCODONE AND ACETAMINOPHEN 10; 325 MG/1; MG/1
1 TABLET ORAL 3 TIMES DAILY
Qty: 90 TABLET | Refills: 0 | Status: SHIPPED | OUTPATIENT
Start: 2019-08-14 | End: 2019-09-16

## 2019-09-05 ENCOUNTER — HOSPITAL ENCOUNTER (EMERGENCY)
Dept: HOSPITAL 83 - ED | Age: 84
Discharge: HOME | End: 2019-09-05
Payer: MEDICARE

## 2019-09-05 VITALS — HEIGHT: 65 IN | WEIGHT: 153 LBS | BODY MASS INDEX: 25.49 KG/M2

## 2019-09-05 VITALS — DIASTOLIC BLOOD PRESSURE: 82 MMHG

## 2019-09-05 DIAGNOSIS — Z79.899: ICD-10-CM

## 2019-09-05 DIAGNOSIS — E78.5: ICD-10-CM

## 2019-09-05 DIAGNOSIS — Z79.01: ICD-10-CM

## 2019-09-05 DIAGNOSIS — I10: ICD-10-CM

## 2019-09-05 DIAGNOSIS — K59.00: Primary | ICD-10-CM

## 2019-09-16 ENCOUNTER — HOSPITAL ENCOUNTER (OUTPATIENT)
Age: 84
Discharge: HOME | End: 2019-09-18
Payer: MEDICARE

## 2019-09-16 ENCOUNTER — TELEPHONE (OUTPATIENT)
Dept: FAMILY MEDICINE CLINIC | Age: 84
End: 2019-09-16

## 2019-09-16 ENCOUNTER — ANTI-COAG VISIT (OUTPATIENT)
Dept: FAMILY MEDICINE CLINIC | Age: 84
End: 2019-09-16

## 2019-09-16 ENCOUNTER — OFFICE VISIT (OUTPATIENT)
Dept: FAMILY MEDICINE CLINIC | Age: 84
End: 2019-09-16
Payer: MEDICARE

## 2019-09-16 VITALS
DIASTOLIC BLOOD PRESSURE: 74 MMHG | OXYGEN SATURATION: 91 % | WEIGHT: 150 LBS | HEART RATE: 62 BPM | BODY MASS INDEX: 24.96 KG/M2 | SYSTOLIC BLOOD PRESSURE: 130 MMHG

## 2019-09-16 DIAGNOSIS — Z79.01 ANTICOAGULANT LONG-TERM USE: Primary | ICD-10-CM

## 2019-09-16 DIAGNOSIS — G89.4 CHRONIC PAIN SYNDROME: ICD-10-CM

## 2019-09-16 DIAGNOSIS — F11.90 CHRONIC, CONTINUOUS USE OF OPIOIDS: ICD-10-CM

## 2019-09-16 DIAGNOSIS — Z79.01 ANTICOAGULANT LONG-TERM USE: ICD-10-CM

## 2019-09-16 DIAGNOSIS — I48.91 ATRIAL FIBRILLATION, UNSPECIFIED TYPE (HCC): ICD-10-CM

## 2019-09-16 DIAGNOSIS — Z91.81 AT HIGH RISK FOR FALLS: ICD-10-CM

## 2019-09-16 DIAGNOSIS — I10 ESSENTIAL HYPERTENSION: ICD-10-CM

## 2019-09-16 DIAGNOSIS — S22.000A COMPRESSION FRACTURE OF BODY OF THORACIC VERTEBRA (HCC): ICD-10-CM

## 2019-09-16 LAB
ALBUMIN SERPL-MCNC: 3.9 G/DL (ref 3.5–5.2)
ALP BLD-CCNC: 56 U/L (ref 35–104)
ALT SERPL-CCNC: 15 U/L (ref 0–32)
ANION GAP SERPL CALCULATED.3IONS-SCNC: 12 MMOL/L (ref 7–16)
AST SERPL-CCNC: 23 U/L (ref 0–31)
BASOPHILS ABSOLUTE: 0.05 E9/L (ref 0–0.2)
BASOPHILS RELATIVE PERCENT: 0.7 % (ref 0–2)
BILIRUB SERPL-MCNC: 0.8 MG/DL (ref 0–1.2)
BUN BLDV-MCNC: 18 MG/DL (ref 8–23)
CALCIUM SERPL-MCNC: 9.7 MG/DL (ref 8.6–10.2)
CHLORIDE BLD-SCNC: 106 MMOL/L (ref 98–107)
CO2: 27 MMOL/L (ref 22–29)
CREAT SERPL-MCNC: 1 MG/DL (ref 0.5–1)
EOSINOPHILS ABSOLUTE: 0.08 E9/L (ref 0.05–0.5)
EOSINOPHILS RELATIVE PERCENT: 1.1 % (ref 0–6)
GFR AFRICAN AMERICAN: >60
GFR NON-AFRICAN AMERICAN: 52 ML/MIN/1.73
GLUCOSE BLD-MCNC: 67 MG/DL (ref 74–99)
HCT VFR BLD CALC: 47.9 % (ref 34–48)
HEMOGLOBIN: 14.4 G/DL (ref 11.5–15.5)
IMMATURE GRANULOCYTES #: 0.03 E9/L
IMMATURE GRANULOCYTES %: 0.4 % (ref 0–5)
INTERNATIONAL NORMALIZATION RATIO, POC: 1.8
LYMPHOCYTES ABSOLUTE: 1.8 E9/L (ref 1.5–4)
LYMPHOCYTES RELATIVE PERCENT: 24.1 % (ref 20–42)
MCH RBC QN AUTO: 31.8 PG (ref 26–35)
MCHC RBC AUTO-ENTMCNC: 30.1 % (ref 32–34.5)
MCV RBC AUTO: 105.7 FL (ref 80–99.9)
MONOCYTES ABSOLUTE: 0.67 E9/L (ref 0.1–0.95)
MONOCYTES RELATIVE PERCENT: 9 % (ref 2–12)
NEUTROPHILS ABSOLUTE: 4.83 E9/L (ref 1.8–7.3)
NEUTROPHILS RELATIVE PERCENT: 64.7 % (ref 43–80)
PDW BLD-RTO: 14.6 FL (ref 11.5–15)
PLATELET # BLD: 236 E9/L (ref 130–450)
PMV BLD AUTO: 10.7 FL (ref 7–12)
POTASSIUM SERPL-SCNC: 4.4 MMOL/L (ref 3.5–5)
PROTHROMBIN TIME, POC: 21.6
RBC # BLD: 4.53 E12/L (ref 3.5–5.5)
SODIUM BLD-SCNC: 145 MMOL/L (ref 132–146)
TOTAL PROTEIN: 7.6 G/DL (ref 6.4–8.3)
WBC # BLD: 7.5 E9/L (ref 4.5–11.5)

## 2019-09-16 PROCEDURE — 80053 COMPREHEN METABOLIC PANEL: CPT

## 2019-09-16 PROCEDURE — 99214 OFFICE O/P EST MOD 30 MIN: CPT | Performed by: INTERNAL MEDICINE

## 2019-09-16 PROCEDURE — 85610 PROTHROMBIN TIME: CPT | Performed by: INTERNAL MEDICINE

## 2019-09-16 PROCEDURE — 85025 COMPLETE CBC W/AUTO DIFF WBC: CPT

## 2019-09-16 PROCEDURE — 36415 COLL VENOUS BLD VENIPUNCTURE: CPT

## 2019-09-16 RX ORDER — WARFARIN SODIUM 5 MG/1
5 TABLET ORAL DAILY
Qty: 30 TABLET | Refills: 5 | Status: SHIPPED
Start: 2019-09-16 | End: 2020-03-20 | Stop reason: SDUPTHER

## 2019-09-16 RX ORDER — OXYCODONE AND ACETAMINOPHEN 10; 325 MG/1; MG/1
1 TABLET ORAL 3 TIMES DAILY PRN
Qty: 90 TABLET | Refills: 0 | Status: SHIPPED | OUTPATIENT
Start: 2019-09-27 | End: 2019-10-21 | Stop reason: SDUPTHER

## 2019-09-16 RX ORDER — ISOSORBIDE MONONITRATE 30 MG/1
30 TABLET, EXTENDED RELEASE ORAL DAILY
Qty: 30 TABLET | Refills: 5 | Status: SHIPPED
Start: 2019-09-16 | End: 2020-03-20 | Stop reason: SDUPTHER

## 2019-09-16 RX ORDER — POTASSIUM CHLORIDE 1.5 G/1.77G
20 POWDER, FOR SOLUTION ORAL DAILY
Qty: 30 EACH | Refills: 5 | Status: SHIPPED
Start: 2019-09-16 | End: 2020-03-20 | Stop reason: SDUPTHER

## 2019-09-16 RX ORDER — ATORVASTATIN CALCIUM 40 MG/1
40 TABLET, FILM COATED ORAL DAILY
Qty: 30 TABLET | Refills: 5 | Status: SHIPPED
Start: 2019-09-16 | End: 2020-03-20 | Stop reason: SDUPTHER

## 2019-09-16 NOTE — PROGRESS NOTES
phone: Not on file     Gets together: Not on file     Attends Yarsanism service: Not on file     Active member of club or organization: Not on file     Attends meetings of clubs or organizations: Not on file     Relationship status: Not on file    Intimate partner violence:     Fear of current or ex partner: Not on file     Emotionally abused: Not on file     Physically abused: Not on file     Forced sexual activity: Not on file   Other Topics Concern    Not on file   Social History Narrative    Not on file       Vitals:    09/16/19 1429   BP: 130/74   Pulse: 62   SpO2: 91%   Weight: 150 lb (68 kg)       Physical Exam  Const: Appears well developed and well nourished. No signs of acute distress present. Neck: Supple and symmetric. Palpation reveals no adenopathy. No masses appreciated. Thyroid exhibits no nodules  or thyromegaly. No JVD. Carotids: 2+ and equal bilaterally, without bruits. Resp: Rhonchi appreciated over the lungs bilaterally, but no rales or wheezes appreciated over the lungs bilaterally. CV: Rate is regular. Rhythm is regular. S1 is normal. S2 is normal. No gallop or rubs. No heart murmur  appreciated. Extremities: Edema, but no clubbing or cyanosis-left greater than right. Left side is typically larger than right-improved  Abdomen: Bowel sounds are normoactive. Palpation of the abdomen reveals softness, but no distension,  organomegaly or tenderness. No abdominal masses. No palpable hepatosplenomegaly. Musculo: Patient arrived at office  using a walker  Upper  Extremities: Full ROM bilaterally. Lower Extremities: Full ROM bilaterally/. She does have some reproducible  tenderness along the lower thoracic upper lumbar region to palpation. No gross deformities noted. Pulses are  palpable. Psych: Patient's attitude is cooperative. Patient's affect is appropriate. Judgement is realistic. Insight is appropriate.   Neurologically grossly intact without focal deficits noted        Assessment and

## 2019-09-17 ENCOUNTER — TELEPHONE (OUTPATIENT)
Dept: FAMILY MEDICINE CLINIC | Age: 84
End: 2019-09-17

## 2019-10-21 ENCOUNTER — OFFICE VISIT (OUTPATIENT)
Dept: FAMILY MEDICINE CLINIC | Age: 84
End: 2019-10-21
Payer: MEDICARE

## 2019-10-21 VITALS
HEART RATE: 76 BPM | OXYGEN SATURATION: 92 % | SYSTOLIC BLOOD PRESSURE: 130 MMHG | DIASTOLIC BLOOD PRESSURE: 80 MMHG | BODY MASS INDEX: 24.96 KG/M2 | WEIGHT: 150 LBS

## 2019-10-21 DIAGNOSIS — I48.91 ATRIAL FIBRILLATION, UNSPECIFIED TYPE (HCC): ICD-10-CM

## 2019-10-21 DIAGNOSIS — G89.4 CHRONIC PAIN SYNDROME: ICD-10-CM

## 2019-10-21 DIAGNOSIS — Z23 IMMUNIZATION DUE: Primary | ICD-10-CM

## 2019-10-21 DIAGNOSIS — S22.000A COMPRESSION FRACTURE OF BODY OF THORACIC VERTEBRA (HCC): ICD-10-CM

## 2019-10-21 LAB
INTERNATIONAL NORMALIZATION RATIO, POC: 2.1
PROTHROMBIN TIME, POC: 24.8

## 2019-10-21 PROCEDURE — G8420 CALC BMI NORM PARAMETERS: HCPCS | Performed by: INTERNAL MEDICINE

## 2019-10-21 PROCEDURE — G8482 FLU IMMUNIZE ORDER/ADMIN: HCPCS | Performed by: INTERNAL MEDICINE

## 2019-10-21 PROCEDURE — 1036F TOBACCO NON-USER: CPT | Performed by: INTERNAL MEDICINE

## 2019-10-21 PROCEDURE — G8427 DOCREV CUR MEDS BY ELIG CLIN: HCPCS | Performed by: INTERNAL MEDICINE

## 2019-10-21 PROCEDURE — 4040F PNEUMOC VAC/ADMIN/RCVD: CPT | Performed by: INTERNAL MEDICINE

## 2019-10-21 PROCEDURE — 1090F PRES/ABSN URINE INCON ASSESS: CPT | Performed by: INTERNAL MEDICINE

## 2019-10-21 PROCEDURE — 85610 PROTHROMBIN TIME: CPT | Performed by: INTERNAL MEDICINE

## 2019-10-21 PROCEDURE — G8598 ASA/ANTIPLAT THER USED: HCPCS | Performed by: INTERNAL MEDICINE

## 2019-10-21 PROCEDURE — 1123F ACP DISCUSS/DSCN MKR DOCD: CPT | Performed by: INTERNAL MEDICINE

## 2019-10-21 PROCEDURE — 90653 IIV ADJUVANT VACCINE IM: CPT | Performed by: INTERNAL MEDICINE

## 2019-10-21 PROCEDURE — G0008 ADMIN INFLUENZA VIRUS VAC: HCPCS | Performed by: INTERNAL MEDICINE

## 2019-10-21 PROCEDURE — 99213 OFFICE O/P EST LOW 20 MIN: CPT | Performed by: INTERNAL MEDICINE

## 2019-10-21 RX ORDER — OXYCODONE AND ACETAMINOPHEN 10; 325 MG/1; MG/1
1 TABLET ORAL 3 TIMES DAILY PRN
Qty: 90 TABLET | Refills: 0 | Status: SHIPPED | OUTPATIENT
Start: 2019-10-31 | End: 2019-11-25 | Stop reason: SDUPTHER

## 2019-11-25 ENCOUNTER — OFFICE VISIT (OUTPATIENT)
Dept: FAMILY MEDICINE CLINIC | Age: 84
End: 2019-11-25
Payer: MEDICARE

## 2019-11-25 VITALS
SYSTOLIC BLOOD PRESSURE: 136 MMHG | WEIGHT: 154 LBS | DIASTOLIC BLOOD PRESSURE: 82 MMHG | BODY MASS INDEX: 25.63 KG/M2 | HEART RATE: 78 BPM

## 2019-11-25 DIAGNOSIS — G89.4 CHRONIC PAIN SYNDROME: ICD-10-CM

## 2019-11-25 DIAGNOSIS — I48.91 ATRIAL FIBRILLATION, UNSPECIFIED TYPE (HCC): ICD-10-CM

## 2019-11-25 DIAGNOSIS — F32.9 CURRENT EPISODE OF MAJOR DEPRESSIVE DISORDER WITHOUT PRIOR EPISODE, UNSPECIFIED DEPRESSION EPISODE SEVERITY: ICD-10-CM

## 2019-11-25 DIAGNOSIS — I50.9 CONGESTIVE HEART FAILURE, UNSPECIFIED HF CHRONICITY, UNSPECIFIED HEART FAILURE TYPE (HCC): ICD-10-CM

## 2019-11-25 DIAGNOSIS — S22.000A COMPRESSION FRACTURE OF BODY OF THORACIC VERTEBRA (HCC): ICD-10-CM

## 2019-11-25 LAB
INTERNATIONAL NORMALIZATION RATIO, POC: 2.3
PROTHROMBIN TIME, POC: 27.3

## 2019-11-25 PROCEDURE — 4040F PNEUMOC VAC/ADMIN/RCVD: CPT | Performed by: INTERNAL MEDICINE

## 2019-11-25 PROCEDURE — G8482 FLU IMMUNIZE ORDER/ADMIN: HCPCS | Performed by: INTERNAL MEDICINE

## 2019-11-25 PROCEDURE — 1036F TOBACCO NON-USER: CPT | Performed by: INTERNAL MEDICINE

## 2019-11-25 PROCEDURE — G8417 CALC BMI ABV UP PARAM F/U: HCPCS | Performed by: INTERNAL MEDICINE

## 2019-11-25 PROCEDURE — 99213 OFFICE O/P EST LOW 20 MIN: CPT | Performed by: INTERNAL MEDICINE

## 2019-11-25 PROCEDURE — G8428 CUR MEDS NOT DOCUMENT: HCPCS | Performed by: INTERNAL MEDICINE

## 2019-11-25 PROCEDURE — 1123F ACP DISCUSS/DSCN MKR DOCD: CPT | Performed by: INTERNAL MEDICINE

## 2019-11-25 PROCEDURE — 1090F PRES/ABSN URINE INCON ASSESS: CPT | Performed by: INTERNAL MEDICINE

## 2019-11-25 PROCEDURE — G8598 ASA/ANTIPLAT THER USED: HCPCS | Performed by: INTERNAL MEDICINE

## 2019-11-25 PROCEDURE — 85610 PROTHROMBIN TIME: CPT | Performed by: INTERNAL MEDICINE

## 2019-11-25 RX ORDER — FLUOXETINE 20 MG/1
TABLET, FILM COATED ORAL
Qty: 15 TABLET | Refills: 5 | Status: SHIPPED
Start: 2019-11-25 | End: 2020-03-20 | Stop reason: SDUPTHER

## 2019-11-25 RX ORDER — OXYCODONE AND ACETAMINOPHEN 10; 325 MG/1; MG/1
1 TABLET ORAL 3 TIMES DAILY PRN
Qty: 90 TABLET | Refills: 0 | Status: SHIPPED | OUTPATIENT
Start: 2019-11-25 | End: 2019-12-16 | Stop reason: SDUPTHER

## 2019-12-16 ENCOUNTER — OFFICE VISIT (OUTPATIENT)
Dept: FAMILY MEDICINE CLINIC | Age: 84
End: 2019-12-16
Payer: MEDICARE

## 2019-12-16 ENCOUNTER — HOSPITAL ENCOUNTER (OUTPATIENT)
Age: 84
Discharge: HOME | End: 2019-12-18
Payer: MEDICARE

## 2019-12-16 VITALS
OXYGEN SATURATION: 92 % | DIASTOLIC BLOOD PRESSURE: 78 MMHG | BODY MASS INDEX: 27.12 KG/M2 | HEART RATE: 80 BPM | SYSTOLIC BLOOD PRESSURE: 126 MMHG | WEIGHT: 163 LBS

## 2019-12-16 DIAGNOSIS — F11.90 CHRONIC, CONTINUOUS USE OF OPIOIDS: ICD-10-CM

## 2019-12-16 DIAGNOSIS — I48.91 ATRIAL FIBRILLATION, UNSPECIFIED TYPE (HCC): ICD-10-CM

## 2019-12-16 DIAGNOSIS — S22.000A COMPRESSION FRACTURE OF BODY OF THORACIC VERTEBRA (HCC): ICD-10-CM

## 2019-12-16 DIAGNOSIS — I10 ESSENTIAL HYPERTENSION: ICD-10-CM

## 2019-12-16 DIAGNOSIS — G89.4 CHRONIC PAIN SYNDROME: ICD-10-CM

## 2019-12-16 DIAGNOSIS — R53.83 FATIGUE, UNSPECIFIED TYPE: ICD-10-CM

## 2019-12-16 DIAGNOSIS — E78.5 HYPERLIPIDEMIA, UNSPECIFIED HYPERLIPIDEMIA TYPE: ICD-10-CM

## 2019-12-16 DIAGNOSIS — Z79.01 ANTICOAGULANT LONG-TERM USE: Primary | ICD-10-CM

## 2019-12-16 LAB
ALBUMIN SERPL-MCNC: 3.6 G/DL (ref 3.5–5.2)
ALP BLD-CCNC: 69 U/L (ref 35–104)
ALT SERPL-CCNC: 20 U/L (ref 0–32)
ANION GAP SERPL CALCULATED.3IONS-SCNC: 17 MMOL/L (ref 7–16)
AST SERPL-CCNC: 27 U/L (ref 0–31)
BASOPHILS ABSOLUTE: 0.03 E9/L (ref 0–0.2)
BASOPHILS RELATIVE PERCENT: 0.4 % (ref 0–2)
BILIRUB SERPL-MCNC: 0.9 MG/DL (ref 0–1.2)
BUN BLDV-MCNC: 24 MG/DL (ref 8–23)
CALCIUM SERPL-MCNC: 9.2 MG/DL (ref 8.6–10.2)
CHLORIDE BLD-SCNC: 110 MMOL/L (ref 98–107)
CHOLESTEROL, TOTAL: 110 MG/DL (ref 0–199)
CO2: 21 MMOL/L (ref 22–29)
CREAT SERPL-MCNC: 1.3 MG/DL (ref 0.5–1)
EOSINOPHILS ABSOLUTE: 0.04 E9/L (ref 0.05–0.5)
EOSINOPHILS RELATIVE PERCENT: 0.5 % (ref 0–6)
GFR AFRICAN AMERICAN: 47
GFR NON-AFRICAN AMERICAN: 39 ML/MIN/1.73
GLUCOSE BLD-MCNC: 91 MG/DL (ref 74–99)
HCT VFR BLD CALC: 42.9 % (ref 34–48)
HDLC SERPL-MCNC: 39 MG/DL
HEMOGLOBIN: 13 G/DL (ref 11.5–15.5)
IMMATURE GRANULOCYTES #: 0.04 E9/L
IMMATURE GRANULOCYTES %: 0.5 % (ref 0–5)
INTERNATIONAL NORMALIZATION RATIO, POC: 2.3
LDL CHOLESTEROL CALCULATED: 50 MG/DL (ref 0–99)
LYMPHOCYTES ABSOLUTE: 1.3 E9/L (ref 1.5–4)
LYMPHOCYTES RELATIVE PERCENT: 16.6 % (ref 20–42)
MCH RBC QN AUTO: 32.2 PG (ref 26–35)
MCHC RBC AUTO-ENTMCNC: 30.3 % (ref 32–34.5)
MCV RBC AUTO: 106.2 FL (ref 80–99.9)
MONOCYTES ABSOLUTE: 0.54 E9/L (ref 0.1–0.95)
MONOCYTES RELATIVE PERCENT: 6.9 % (ref 2–12)
NEUTROPHILS ABSOLUTE: 5.86 E9/L (ref 1.8–7.3)
NEUTROPHILS RELATIVE PERCENT: 75.1 % (ref 43–80)
PDW BLD-RTO: 15.4 FL (ref 11.5–15)
PLATELET # BLD: 193 E9/L (ref 130–450)
PMV BLD AUTO: 11.7 FL (ref 7–12)
POTASSIUM SERPL-SCNC: 4.6 MMOL/L (ref 3.5–5)
PROTHROMBIN TIME, POC: 27.5
RBC # BLD: 4.04 E12/L (ref 3.5–5.5)
SODIUM BLD-SCNC: 148 MMOL/L (ref 132–146)
TOTAL PROTEIN: 6.9 G/DL (ref 6.4–8.3)
TRIGL SERPL-MCNC: 105 MG/DL (ref 0–149)
TSH SERPL DL<=0.05 MIU/L-ACNC: 3.68 UIU/ML (ref 0.27–4.2)
VITAMIN B-12: >2000 PG/ML (ref 211–946)
VLDLC SERPL CALC-MCNC: 21 MG/DL
WBC # BLD: 7.8 E9/L (ref 4.5–11.5)

## 2019-12-16 PROCEDURE — 80061 LIPID PANEL: CPT

## 2019-12-16 PROCEDURE — G8417 CALC BMI ABV UP PARAM F/U: HCPCS | Performed by: INTERNAL MEDICINE

## 2019-12-16 PROCEDURE — 85025 COMPLETE CBC W/AUTO DIFF WBC: CPT

## 2019-12-16 PROCEDURE — 1123F ACP DISCUSS/DSCN MKR DOCD: CPT | Performed by: INTERNAL MEDICINE

## 2019-12-16 PROCEDURE — 85610 PROTHROMBIN TIME: CPT | Performed by: INTERNAL MEDICINE

## 2019-12-16 PROCEDURE — 36415 COLL VENOUS BLD VENIPUNCTURE: CPT

## 2019-12-16 PROCEDURE — 82607 VITAMIN B-12: CPT

## 2019-12-16 PROCEDURE — G8427 DOCREV CUR MEDS BY ELIG CLIN: HCPCS | Performed by: INTERNAL MEDICINE

## 2019-12-16 PROCEDURE — G8598 ASA/ANTIPLAT THER USED: HCPCS | Performed by: INTERNAL MEDICINE

## 2019-12-16 PROCEDURE — 84443 ASSAY THYROID STIM HORMONE: CPT

## 2019-12-16 PROCEDURE — 1036F TOBACCO NON-USER: CPT | Performed by: INTERNAL MEDICINE

## 2019-12-16 PROCEDURE — G8482 FLU IMMUNIZE ORDER/ADMIN: HCPCS | Performed by: INTERNAL MEDICINE

## 2019-12-16 PROCEDURE — 99214 OFFICE O/P EST MOD 30 MIN: CPT | Performed by: INTERNAL MEDICINE

## 2019-12-16 PROCEDURE — 4040F PNEUMOC VAC/ADMIN/RCVD: CPT | Performed by: INTERNAL MEDICINE

## 2019-12-16 PROCEDURE — 80053 COMPREHEN METABOLIC PANEL: CPT

## 2019-12-16 PROCEDURE — 1090F PRES/ABSN URINE INCON ASSESS: CPT | Performed by: INTERNAL MEDICINE

## 2019-12-16 RX ORDER — OXYCODONE AND ACETAMINOPHEN 10; 325 MG/1; MG/1
1 TABLET ORAL 3 TIMES DAILY PRN
Qty: 90 TABLET | Refills: 0 | Status: SHIPPED | OUTPATIENT
Start: 2019-12-24 | End: 2020-01-03 | Stop reason: SDUPTHER

## 2019-12-16 SDOH — ECONOMIC STABILITY: FOOD INSECURITY: WITHIN THE PAST 12 MONTHS, THE FOOD YOU BOUGHT JUST DIDN'T LAST AND YOU DIDN'T HAVE MONEY TO GET MORE.: PATIENT DECLINED

## 2019-12-16 SDOH — ECONOMIC STABILITY: TRANSPORTATION INSECURITY
IN THE PAST 12 MONTHS, HAS LACK OF TRANSPORTATION KEPT YOU FROM MEETINGS, WORK, OR FROM GETTING THINGS NEEDED FOR DAILY LIVING?: PATIENT DECLINED

## 2019-12-16 SDOH — ECONOMIC STABILITY: TRANSPORTATION INSECURITY
IN THE PAST 12 MONTHS, HAS THE LACK OF TRANSPORTATION KEPT YOU FROM MEDICAL APPOINTMENTS OR FROM GETTING MEDICATIONS?: PATIENT DECLINED

## 2019-12-16 SDOH — ECONOMIC STABILITY: FOOD INSECURITY: WITHIN THE PAST 12 MONTHS, YOU WORRIED THAT YOUR FOOD WOULD RUN OUT BEFORE YOU GOT MONEY TO BUY MORE.: PATIENT DECLINED

## 2019-12-16 SDOH — ECONOMIC STABILITY: INCOME INSECURITY: HOW HARD IS IT FOR YOU TO PAY FOR THE VERY BASICS LIKE FOOD, HOUSING, MEDICAL CARE, AND HEATING?: PATIENT DECLINED

## 2019-12-17 ENCOUNTER — TELEPHONE (OUTPATIENT)
Dept: FAMILY MEDICINE CLINIC | Age: 84
End: 2019-12-17

## 2020-01-02 ENCOUNTER — TELEPHONE (OUTPATIENT)
Dept: FAMILY MEDICINE CLINIC | Age: 85
End: 2020-01-02

## 2020-01-02 NOTE — TELEPHONE ENCOUNTER
Last Appointment:  12/16/2019  Future Appointments   Date Time Provider Department Center   1/22/2020 12:45 PM Francisco Judd  [address]   3/23/2020  1:45 PM Francisco Judd  [address]      Patient calling in. She is unable to find her paper script for Percocet. She is unable to remember getting the script while at her last office visit. Patient states if she did get the paper script she has now lost it. Patient states last time she filled it was 11/30 and now she is almost out.  Please advise, thank you

## 2020-01-02 NOTE — TELEPHONE ENCOUNTER
Latha please call patient and find out what happened. Go ahead and refill the Percocet again. I ran and orders. Tell her this is the last time this will happen because I will not refill lost prescriptions of narcotics again. She will have to pick this up after I sign it.

## 2020-01-03 RX ORDER — OXYCODONE AND ACETAMINOPHEN 10; 325 MG/1; MG/1
1 TABLET ORAL 3 TIMES DAILY PRN
Qty: 54 TABLET | Refills: 0 | Status: SHIPPED | OUTPATIENT
Start: 2020-01-03 | End: 2020-01-22 | Stop reason: SDUPTHER

## 2020-01-22 ENCOUNTER — OFFICE VISIT (OUTPATIENT)
Dept: FAMILY MEDICINE CLINIC | Age: 85
End: 2020-01-22
Payer: MEDICARE

## 2020-01-22 ENCOUNTER — HOSPITAL ENCOUNTER (OUTPATIENT)
Age: 85
Discharge: HOME | End: 2020-01-24
Payer: MEDICARE

## 2020-01-22 VITALS
BODY MASS INDEX: 28.79 KG/M2 | OXYGEN SATURATION: 92 % | HEART RATE: 66 BPM | SYSTOLIC BLOOD PRESSURE: 130 MMHG | WEIGHT: 173 LBS | DIASTOLIC BLOOD PRESSURE: 84 MMHG

## 2020-01-22 LAB
INTERNATIONAL NORMALIZATION RATIO, POC: 5.1
PROTHROMBIN TIME, POC: 61.2

## 2020-01-22 PROCEDURE — 36415 COLL VENOUS BLD VENIPUNCTURE: CPT

## 2020-01-22 PROCEDURE — 82746 ASSAY OF FOLIC ACID SERUM: CPT

## 2020-01-22 PROCEDURE — 1123F ACP DISCUSS/DSCN MKR DOCD: CPT | Performed by: INTERNAL MEDICINE

## 2020-01-22 PROCEDURE — 82150 ASSAY OF AMYLASE: CPT

## 2020-01-22 PROCEDURE — G8417 CALC BMI ABV UP PARAM F/U: HCPCS | Performed by: INTERNAL MEDICINE

## 2020-01-22 PROCEDURE — G8482 FLU IMMUNIZE ORDER/ADMIN: HCPCS | Performed by: INTERNAL MEDICINE

## 2020-01-22 PROCEDURE — 1090F PRES/ABSN URINE INCON ASSESS: CPT | Performed by: INTERNAL MEDICINE

## 2020-01-22 PROCEDURE — 99215 OFFICE O/P EST HI 40 MIN: CPT | Performed by: INTERNAL MEDICINE

## 2020-01-22 PROCEDURE — 80053 COMPREHEN METABOLIC PANEL: CPT

## 2020-01-22 PROCEDURE — 4040F PNEUMOC VAC/ADMIN/RCVD: CPT | Performed by: INTERNAL MEDICINE

## 2020-01-22 PROCEDURE — 1036F TOBACCO NON-USER: CPT | Performed by: INTERNAL MEDICINE

## 2020-01-22 PROCEDURE — 85610 PROTHROMBIN TIME: CPT | Performed by: INTERNAL MEDICINE

## 2020-01-22 PROCEDURE — G8427 DOCREV CUR MEDS BY ELIG CLIN: HCPCS | Performed by: INTERNAL MEDICINE

## 2020-01-22 RX ORDER — OMEPRAZOLE 20 MG/1
20 CAPSULE, DELAYED RELEASE ORAL
Qty: 30 CAPSULE | Refills: 5 | Status: SHIPPED
Start: 2020-01-22 | End: 2020-03-05 | Stop reason: ALTCHOICE

## 2020-01-22 RX ORDER — FUROSEMIDE 20 MG/1
20 TABLET ORAL DAILY PRN
Qty: 30 TABLET | Refills: 1 | Status: SHIPPED
Start: 2020-01-22 | End: 2020-03-20 | Stop reason: SDUPTHER

## 2020-01-22 RX ORDER — OXYCODONE AND ACETAMINOPHEN 10; 325 MG/1; MG/1
1 TABLET ORAL 3 TIMES DAILY PRN
Qty: 90 TABLET | Refills: 0 | Status: SHIPPED | OUTPATIENT
Start: 2020-01-22 | End: 2020-03-20 | Stop reason: SDUPTHER

## 2020-01-23 ENCOUNTER — TELEPHONE (OUTPATIENT)
Dept: FAMILY MEDICINE CLINIC | Age: 85
End: 2020-01-23

## 2020-01-23 LAB
ALBUMIN SERPL-MCNC: 3.5 G/DL (ref 3.5–5.2)
ALP BLD-CCNC: 61 U/L (ref 35–104)
ALT SERPL-CCNC: 12 U/L (ref 0–32)
AMYLASE: 19 U/L (ref 20–100)
ANION GAP SERPL CALCULATED.3IONS-SCNC: 15 MMOL/L (ref 7–16)
AST SERPL-CCNC: 28 U/L (ref 0–31)
BILIRUB SERPL-MCNC: 1.1 MG/DL (ref 0–1.2)
BUN BLDV-MCNC: 18 MG/DL (ref 8–23)
CALCIUM SERPL-MCNC: 9.1 MG/DL (ref 8.6–10.2)
CHLORIDE BLD-SCNC: 100 MMOL/L (ref 98–107)
CO2: 22 MMOL/L (ref 22–29)
CREAT SERPL-MCNC: 0.9 MG/DL (ref 0.5–1)
FOLATE: 11.3 NG/ML (ref 4.8–24.2)
GFR AFRICAN AMERICAN: >60
GFR NON-AFRICAN AMERICAN: 59 ML/MIN/1.73
GLUCOSE BLD-MCNC: 113 MG/DL (ref 74–99)
POTASSIUM SERPL-SCNC: 3.9 MMOL/L (ref 3.5–5)
SODIUM BLD-SCNC: 137 MMOL/L (ref 132–146)
TOTAL PROTEIN: 7.1 G/DL (ref 6.4–8.3)

## 2020-01-24 NOTE — PROGRESS NOTES
fever.   HENT: Negative for congestion, ear pain, hearing loss, postnasal drip, rhinorrhea and sinus pain.    Respiratory: Positive for shortness of breath.-Stable negative for cough and wheezing.         COPD chronic oxygen use   Cardiovascular: Negative for chest pain, palpitations. Positive for leg swelling. Gastrointestinal: Positive for constipation.  Recent ER visit for same.-Improved also positive for the nausea. See above. Negative for abdominal pain, blood in stool, diarrhea,  and vomiting.         Endocrine: Negative.    Genitourinary: Negative for difficulty urinating, dysuria, hematuria and urgency.         Reports frequent UTIs .does have some incontinence of urine following with urology. Arvilla Ran hematuria has been worked up. Musculoskeletal: Positive for arthralgias.  back pain, and gait problem       Chronic pain syndrome/back pain--lower back pain and bilateral hip pain/chronic-continues on narcotic pain medication with success. T11-L1 vertebroplasty. Successful.   Skin: Negative.    Neurological: Negative for dizziness, weakness, light-headedness, numbness and headaches. Hematological: Negative.    Psychiatric/Behavioral: Admits to being more emotional and angry since taken off of her Prozac.-Placed back on again with improvement. REST OF PERTINENT ROS GONE OVER AND WAS NEGATIVE.      PMH:  Problem List: Chronic pain syndrome, Long-term current use of anticoagulant, Anticoagulant, Osteoarthritis, Taking  medication, Hyperlipidemia  Health Maintenance:  Mammogram - (1/23/2019)  Mammogram Screening - (1/23/2019)  Influenza Vaccination - (11/21/2018)  Bone Density Scan - (1/11/2011)  Colonoscopy - (11/12/2015)  Couseled on Home Safety - (4/12/2017)  Bone Density Test Screening - (5/23/2014)  Stress Test - 1/08-ok,3/11  EKG - 7/09,11,3/13  Rectal Exam - 8/10  Breast Exam - 8/10,declined,2/14  Hemmocult Cards - 2/13-neg x 3  2D ECHO - 8/15  Mini Mental Status - 4/19--29/30  Medical tablet by mouth daily Take morning of surgery with a sip of water, Disp: 30 tablet, Rfl: 5    atorvastatin (LIPITOR) 40 MG tablet, Take 1 tablet by mouth daily, Disp: 30 tablet, Rfl: 5    potassium chloride (KLOR-CON) 20 MEQ packet, Take 20 mEq by mouth daily, Disp: 30 each, Rfl: 5    warfarin (COUMADIN) 5 MG tablet, Take 1 tablet by mouth daily Take 1 po qd or as instructed by PCP, Disp: 30 tablet, Rfl: 5    lisinopril (PRINIVIL;ZESTRIL) 10 MG tablet, Take 1 tablet by mouth every evening (Patient taking differently: Take 10 mg by mouth daily ), Disp: 30 tablet, Rfl: 3    vitamin B-12 (CYANOCOBALAMIN) 500 MCG tablet, Take 1,000 mcg by mouth daily Ld 9-12-17, Disp: , Rfl:   No Known Allergies    Past Medical History:   Diagnosis Date    AAA [facility]ab[facility] aneurysm) ([facility] [address]     Anticoagulant long-term use 5/29/2019    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)     CAD (coronary artery disease)     CHF (congestive heart failure) ([facility] [address]     Chronic pain syndrome 5/29/2019    HTN (hypertension)     Hx of blood clots 2015    right upper arm    Hyperlipemia      Past Surgical History:   Procedure Laterality Date    BACK SURGERY  2010    [facility], pt unsure    COLONOSCOPY      EYE SURGERY Bilateral     cataracts    FIXATION KYPHOPLASTY  10/02/2017    T12 kyphoplasty Dr Margy Conley    FIXATION KYPHOPLASTY  11/20/2017    T11-L1 kypho    HYSTERECTOMY      HYSTERECTOMY, TOTAL ABDOMINAL      TONSILLECTOMY      TRANSESOPHAGEAL ECHOCARDIOGRAM  11/04/15     Family History   Problem Relation Age of Onset    Breast Cancer Mother [de-identified]    Heart Disease Father     Cancer Sister         melanoma     Social History     Socioeconomic History    Marital status:      Spouse name: Not on file    Number of children: Not on file    Years of education: Not on file    Highest education level: Not on file   Occupational History    Not on file   Social Needs    Financial resource strain: Patient refused    Food insecurity:     Worry: Patient refused     Inability: Patient refused    Transportation needs:     Medical: Patient refused     Non-medical: Patient refused   Tobacco Use    Smoking status: Former Smoker    Smokeless tobacco: Never Used    Tobacco comment: quit about 30 yrs ago   Substance and Sexual Activity    Alcohol use: No    Drug use: No    Sexual activity: Not on file   Lifestyle    Physical activity:     Days per week: Not on file     Minutes per session: Not on file    Stress: Not on file   Relationships    Social connections:     Talks on phone: Not on file     Gets together: Not on file     Attends Rastafari service: Not on file     Active member of club or organization: Not on file     Attends meetings of clubs or organizations: Not on file     Relationship status: Not on file    Intimate partner violence:     Fear of current or ex partner: Not on file     Emotionally abused: Not on file     Physically abused: Not on file     Forced sexual activity: Not on file   Other Topics Concern    Not on file   Social History Narrative    Not on file       Vitals:    01/22/20 1258   BP: 130/84   Pulse: 66   SpO2: 92%   Weight: 173 lb (78.5 kg)       Physical Exam    Const: Appears well developed and well nourished. No signs of acute distress present. Neck: Supple and symmetric. Palpation reveals no adenopathy. No masses appreciated. Thyroid exhibits no nodules  or thyromegaly. No JVD. Carotids: 2+ and equal bilaterally, without bruits. Resp: Rhonchi appreciated over the lungs bilaterally, but no rales or wheezes appreciated over the lungs bilaterally. Prolonged expiratory phase  CV: Rate is regular. Rhythm is regular. S1 is normal. S2 is normal. No gallop or rubs. No heart murmur  appreciated. Extremities: Edema, but no clubbing or cyanosis-left greater than right. Left side is typically larger than right  Abdomen: Bowel sounds are normoactive.  Palpation of the abdomen reveals softness, but no distension,  organomegaly or tenderness. No abdominal masses. No palpable hepatosplenomegaly. Musculo: Patient arrived at office in wheelchair  Upper  Extremities: Full ROM bilaterally. Lower Extremities: Full ROM bilaterally/. She does have some reproducible  tenderness along the lower thoracic upper lumbar region to palpation. No gross deformities noted. Pulses are  palpable. Psych: Patient's attitude is cooperative. Patient's affect is appropriate. Judgement is realistic. Insight is appropriate. Neurologically grossly intact without focal deficits noted    Assessment and Plan:  Raine Dominguez was seen today for nausea. Diagnoses and all orders for this visit:    Chronic pain syndrome  -     oxyCODONE-acetaminophen (PERCOCET)  MG per tablet; Take 1 tablet by mouth 3 times daily as needed for Pain for up to 30 days. Compression fracture of body of thoracic vertebra (HCC)  -     oxyCODONE-acetaminophen (PERCOCET)  MG per tablet; Take 1 tablet by mouth 3 times daily as needed for Pain for up to 30 days. Chronic, continuous use of opioids  -     oxyCODONE-acetaminophen (PERCOCET)  MG per tablet; Take 1 tablet by mouth 3 times daily as needed for Pain for up to 30 days. Nausea    Weight gain    Anticoagulant long-term use    Essential hypertension  -     Comprehensive Metabolic Panel; Future  -     AMYLASE; Future  -     furosemide (LASIX) 20 MG tablet; Take 1 tablet by mouth daily as needed (edema)    Fatigue, unspecified type    Coronary artery disease involving native coronary artery of native heart without angina pectoris    Macrocytosis  -     Jasbir Elizabeth MD, Hematology and Oncology, [place] ([facility])  -     FOLATE; Future    Other orders  -     POCT INR  -     omeprazole (PRILOSEC) 20 MG delayed release capsule;  Take 1 capsule by mouth every morning (before breakfast)    Plan: I started her on Prilosec for abdominal symptoms as a trial.  Limited blood work today to monitor disease progression medication use. Started back on Lasix 3 times a week. Warned of potential side effects. Increase potassium in diet. Check INR in 2 weeks as well as regular follow-up. See above body report. Set her up with hematology/oncology regarding macrocytosis. Will need to watch her weight. Further GI evaluation depending on above. Prescription management performed. Continue to monitor blood pressure closely. States it has been okay at home. Notify us with problems in the interim. This was a rather complicated visit with face-to-face time greater than 40 minutes. Greater than 50% of that time spent in counseling and coordination of care. Told patient that if symptoms worsen over the next several days to go to the emergency room. Return in about 2 weeks (around 2/5/2020), or inr and fu. Seen By:  Solange Byrne MD      *Document was created using voice recognition software. Note was reviewed however may contain grammatical errors.

## 2020-02-05 ENCOUNTER — OFFICE VISIT (OUTPATIENT)
Dept: FAMILY MEDICINE CLINIC | Age: 85
End: 2020-02-05
Payer: MEDICARE

## 2020-02-05 VITALS
SYSTOLIC BLOOD PRESSURE: 154 MMHG | WEIGHT: 175 LBS | HEIGHT: 63 IN | HEART RATE: 67 BPM | DIASTOLIC BLOOD PRESSURE: 92 MMHG | OXYGEN SATURATION: 92 % | BODY MASS INDEX: 31.01 KG/M2

## 2020-02-05 LAB
INTERNATIONAL NORMALIZATION RATIO, POC: 2.8
PROTHROMBIN TIME, POC: 33.8

## 2020-02-05 PROCEDURE — G8482 FLU IMMUNIZE ORDER/ADMIN: HCPCS | Performed by: INTERNAL MEDICINE

## 2020-02-05 PROCEDURE — 4040F PNEUMOC VAC/ADMIN/RCVD: CPT | Performed by: INTERNAL MEDICINE

## 2020-02-05 PROCEDURE — 99214 OFFICE O/P EST MOD 30 MIN: CPT | Performed by: INTERNAL MEDICINE

## 2020-02-05 PROCEDURE — 1123F ACP DISCUSS/DSCN MKR DOCD: CPT | Performed by: INTERNAL MEDICINE

## 2020-02-05 PROCEDURE — G8417 CALC BMI ABV UP PARAM F/U: HCPCS | Performed by: INTERNAL MEDICINE

## 2020-02-05 PROCEDURE — 1036F TOBACCO NON-USER: CPT | Performed by: INTERNAL MEDICINE

## 2020-02-05 PROCEDURE — 1090F PRES/ABSN URINE INCON ASSESS: CPT | Performed by: INTERNAL MEDICINE

## 2020-02-05 PROCEDURE — G8427 DOCREV CUR MEDS BY ELIG CLIN: HCPCS | Performed by: INTERNAL MEDICINE

## 2020-02-05 PROCEDURE — 85610 PROTHROMBIN TIME: CPT | Performed by: INTERNAL MEDICINE

## 2020-02-05 RX ORDER — FUROSEMIDE 20 MG/1
20 TABLET ORAL DAILY
Qty: 30 TABLET | Refills: 1 | Status: SHIPPED
Start: 2020-02-05 | End: 2020-03-05 | Stop reason: ALTCHOICE

## 2020-02-05 ASSESSMENT — PATIENT HEALTH QUESTIONNAIRE - PHQ9
SUM OF ALL RESPONSES TO PHQ QUESTIONS 1-9: 1
SUM OF ALL RESPONSES TO PHQ QUESTIONS 1-9: 1
SUM OF ALL RESPONSES TO PHQ9 QUESTIONS 1 & 2: 1
2. FEELING DOWN, DEPRESSED OR HOPELESS: 1
1. LITTLE INTEREST OR PLEASURE IN DOING THINGS: 0

## 2020-02-06 NOTE — PROGRESS NOTES
[id number] [facility]     20  Cristian Hawley : 3/1/1932 Sex: female  Age: 80 y.o. Chief Complaint   Patient presents with    Atrial Fibrillation       HPI    Presents today for 2-week follow-up visit on her INR/anticoagulation management. Last visit her INR was 5.1. She had no bleeding episodes at that time. Today after adjustment in her medication she is down to 2.8. Again without any bleeding episodes. She is accompanied by her daughter today. This was supposed to be an INR check visit. Unfortunately his usual turns into  much more. Patient's legs are swelling more. Left leg is always bigger than right leg and has been. She has put on some weight. Told her look like a lot of it was fluid related. I did place her on small amount of Lasix last time I saw her states it really did not do much. She is not wearing compression stockings. I told both her and the daughter she needs to be on these daily from the time she gets out of bed until she goes back to bed. I reviewed last note. Does state that the nausea and upper abdominal discomfort she had last visit improved on the Prilosec. Also had a long discussion attempting to explain her macrocytosis the reason I referred her to hematology/oncology. Going to be seeing them upcoming. She is up-to-date with her consultants including cardiology, urology, and podiatry     Review of Systems     Constitutional: Positive for unexpected weight change.-See above. positive for fatigue and appetite change negative for activity change, chills, diaphoresis,  and fever. HENT: Negative for congestion, ear pain, hearing loss, postnasal drip, rhinorrhea and sinus pain.    Respiratory: Positive for shortness of breath.-Stable negative for cough and wheezing.         COPD chronic oxygen use   Cardiovascular: Negative for chest pain, palpitations. Positive for leg swelling.    Gastrointestinal: Positive for constipation.   Negative for abdominal Activity    Alcohol use: No    Drug use: No    Sexual activity: Not on file   Lifestyle    Physical activity:     Days per week: Not on file     Minutes per session: Not on file    Stress: Not on file   Relationships    Social connections:     Talks on phone: Not on file     Gets together: Not on file     Attends Pentecostalism service: Not on file     Active member of club or organization: Not on file     Attends meetings of clubs or organizations: Not on file     Relationship status: Not on file    Intimate partner violence:     Fear of current or ex partner: Not on file     Emotionally abused: Not on file     Physically abused: Not on file     Forced sexual activity: Not on file   Other Topics Concern    Not on file   Social History Narrative    Not on file       Vitals:    02/05/20 1334   BP: (!) 154/92   Pulse: 67   SpO2: 92%   Weight: 175 lb (79.4 kg)   Height: 5' 3\" (1.6 m)       Physical Exam     Const: Appears well developed and well nourished. No signs of acute distress present. Neck: Supple and symmetric. Palpation reveals no adenopathy. No masses appreciated. Thyroid exhibits no nodules  or thyromegaly. No JVD. Carotids: 2+ and equal bilaterally, without bruits. Resp: Rhonchi appreciated over the lungs bilaterally, but no rales or wheezes appreciated over the lungs bilaterally.  Prolonged expiratory phase  CV: Rate is regular. Rhythm is regular. S1 is normal. S2 is normal. No gallop or rubs. No heart murmur  appreciated. Extremities: 2-3+ pitting edema, but no clubbing or cyanosis-left greater than right. Left side is typically larger than right  Abdomen: Bowel sounds are normoactive. Palpation of the abdomen reveals softness, but no distension,  organomegaly or tenderness. No abdominal masses. No palpable hepatosplenomegaly. Musculo: Patient arrived at office in wheelchair  Upper  Extremities: Full ROM bilaterally. Lower Extremities: Full ROM bilaterally/.  She does have some reproducible  tenderness along the lower thoracic upper lumbar region to palpation. No gross deformities noted. Pulses are  palpable. Psych: Patient's attitude is cooperative. Patient's affect is appropriate. Judgement is realistic. Insight is appropriate. Neurologically grossly intact without focal deficits noted         Assessment and Plan:  Prosper Dunbar was seen today for atrial fibrillation. Diagnoses and all orders for this visit:    Anticoagulant long-term use    Arterial embolism (HCC)  -     POCT INR    Chronic pain syndrome    Essential hypertension    Edema, unspecified type    Chronic, continuous use of opioids    Fatigue, unspecified type    Macrocytosis    Other orders  -     furosemide (LASIX) 20 MG tablet; Take 1 tablet by mouth daily      Plan: She will see hematology/oncology regarding her macrocytosis upcoming. I started Lasix 20 mg daily. Increase potassium in her diet. Reviewed recent blood work with her and the daughter. Compression stockings. Answered all questions. prescription management performed. I will see back in 2 weeks for another PT/INR and narcotic refill visit. Fall precautions. We will check blood work on her next time around is well on the furosemide. Compliance with follow-up of myself and other consultants. Return in about 2 weeks (around 2/19/2020). Seen By:  Mary Perez MD      *Document was created using voice recognition software. Note was reviewed however may contain grammatical errors.

## 2020-02-07 ENCOUNTER — TELEPHONE (OUTPATIENT)
Dept: FAMILY MEDICINE CLINIC | Age: 85
End: 2020-02-07

## 2020-02-07 NOTE — TELEPHONE ENCOUNTER
Tayler Marin - Daughter  [phone]       She is calling about the recent appt. She said you was going to change her water will and give her something different. She went to the pharmacy to get it and found it was the same thing - Lasix. Please advise.

## 2020-02-19 ENCOUNTER — OFFICE VISIT (OUTPATIENT)
Dept: FAMILY MEDICINE CLINIC | Age: 85
End: 2020-02-19
Payer: MEDICARE

## 2020-02-19 ENCOUNTER — TELEPHONE (OUTPATIENT)
Dept: FAMILY MEDICINE CLINIC | Age: 85
End: 2020-02-19

## 2020-02-19 ENCOUNTER — HOSPITAL ENCOUNTER (OUTPATIENT)
Age: 85
Discharge: HOME | End: 2020-02-21
Payer: MEDICARE

## 2020-02-19 VITALS
DIASTOLIC BLOOD PRESSURE: 78 MMHG | HEART RATE: 71 BPM | WEIGHT: 174 LBS | SYSTOLIC BLOOD PRESSURE: 124 MMHG | OXYGEN SATURATION: 95 % | HEIGHT: 62 IN | BODY MASS INDEX: 32.02 KG/M2

## 2020-02-19 LAB
ANION GAP SERPL CALCULATED.3IONS-SCNC: 12 MMOL/L (ref 7–16)
BUN BLDV-MCNC: 14 MG/DL (ref 8–23)
CALCIUM SERPL-MCNC: 9.2 MG/DL (ref 8.6–10.2)
CHLORIDE BLD-SCNC: 105 MMOL/L (ref 98–107)
CO2: 25 MMOL/L (ref 22–29)
CREAT SERPL-MCNC: 0.8 MG/DL (ref 0.5–1)
GFR AFRICAN AMERICAN: >60
GFR NON-AFRICAN AMERICAN: >60 ML/MIN/1.73
GLUCOSE BLD-MCNC: 88 MG/DL (ref 74–99)
INTERNATIONAL NORMALIZATION RATIO, POC: 3.1
POTASSIUM SERPL-SCNC: 3.9 MMOL/L (ref 3.5–5)
PRO-BNP: 5416 PG/ML (ref 0–450)
PROTHROMBIN TIME, POC: 37.6
SODIUM BLD-SCNC: 142 MMOL/L (ref 132–146)

## 2020-02-19 PROCEDURE — G8482 FLU IMMUNIZE ORDER/ADMIN: HCPCS | Performed by: INTERNAL MEDICINE

## 2020-02-19 PROCEDURE — 85610 PROTHROMBIN TIME: CPT | Performed by: INTERNAL MEDICINE

## 2020-02-19 PROCEDURE — 36415 COLL VENOUS BLD VENIPUNCTURE: CPT

## 2020-02-19 PROCEDURE — 3023F SPIROM DOC REV: CPT | Performed by: INTERNAL MEDICINE

## 2020-02-19 PROCEDURE — 4040F PNEUMOC VAC/ADMIN/RCVD: CPT | Performed by: INTERNAL MEDICINE

## 2020-02-19 PROCEDURE — G8417 CALC BMI ABV UP PARAM F/U: HCPCS | Performed by: INTERNAL MEDICINE

## 2020-02-19 PROCEDURE — G8926 SPIRO NO PERF OR DOC: HCPCS | Performed by: INTERNAL MEDICINE

## 2020-02-19 PROCEDURE — 1090F PRES/ABSN URINE INCON ASSESS: CPT | Performed by: INTERNAL MEDICINE

## 2020-02-19 PROCEDURE — 83880 ASSAY OF NATRIURETIC PEPTIDE: CPT

## 2020-02-19 PROCEDURE — 99214 OFFICE O/P EST MOD 30 MIN: CPT | Performed by: INTERNAL MEDICINE

## 2020-02-19 PROCEDURE — G8427 DOCREV CUR MEDS BY ELIG CLIN: HCPCS | Performed by: INTERNAL MEDICINE

## 2020-02-19 PROCEDURE — 1036F TOBACCO NON-USER: CPT | Performed by: INTERNAL MEDICINE

## 2020-02-19 PROCEDURE — 1123F ACP DISCUSS/DSCN MKR DOCD: CPT | Performed by: INTERNAL MEDICINE

## 2020-02-19 PROCEDURE — 80048 BASIC METABOLIC PNL TOTAL CA: CPT

## 2020-02-19 RX ORDER — BUMETANIDE 1 MG/1
1 TABLET ORAL DAILY
Qty: 30 TABLET | Refills: 1 | Status: SHIPPED
Start: 2020-02-19 | End: 2020-03-20 | Stop reason: ALTCHOICE

## 2020-02-20 ENCOUNTER — TELEPHONE (OUTPATIENT)
Dept: FAMILY MEDICINE CLINIC | Age: 85
End: 2020-02-20

## 2020-02-20 ENCOUNTER — HOSPITAL ENCOUNTER (EMERGENCY)
Dept: HOSPITAL 83 - ED | Age: 85
Discharge: HOME | End: 2020-02-20
Payer: MEDICARE

## 2020-02-20 VITALS — HEIGHT: 65 IN | BODY MASS INDEX: 28.99 KG/M2 | WEIGHT: 174 LBS

## 2020-02-20 VITALS — DIASTOLIC BLOOD PRESSURE: 93 MMHG | SYSTOLIC BLOOD PRESSURE: 125 MMHG

## 2020-02-20 DIAGNOSIS — E78.00: ICD-10-CM

## 2020-02-20 DIAGNOSIS — R32: ICD-10-CM

## 2020-02-20 DIAGNOSIS — I11.0: Primary | ICD-10-CM

## 2020-02-20 DIAGNOSIS — Z79.899: ICD-10-CM

## 2020-02-20 DIAGNOSIS — G89.29: ICD-10-CM

## 2020-02-20 DIAGNOSIS — Z87.891: ICD-10-CM

## 2020-02-20 DIAGNOSIS — M79.89: ICD-10-CM

## 2020-02-20 DIAGNOSIS — M19.90: ICD-10-CM

## 2020-02-20 DIAGNOSIS — Z90.710: ICD-10-CM

## 2020-02-20 DIAGNOSIS — R10.9: ICD-10-CM

## 2020-02-20 DIAGNOSIS — Z86.718: ICD-10-CM

## 2020-02-20 DIAGNOSIS — I50.9: ICD-10-CM

## 2020-02-20 DIAGNOSIS — Z79.01: ICD-10-CM

## 2020-02-20 LAB
ALBUMIN SERPL-MCNC: 3.4 GM/DL (ref 3.1–4.5)
ALP SERPL-CCNC: 74 U/L (ref 45–117)
ALT SERPL W P-5'-P-CCNC: 17 U/L (ref 12–78)
APPEARANCE UR: CLEAR
APTT PPP: 33.8 SECONDS (ref 20–32.1)
AST SERPL-CCNC: 26 IU/L (ref 3–35)
BASOPHILS # BLD AUTO: 0 10*3/UL (ref 0–0.1)
BASOPHILS NFR BLD AUTO: 0.6 % (ref 0–1)
BILIRUB UR QL STRIP: NEGATIVE
BUN SERPL-MCNC: 14 MG/DL (ref 7–24)
CHLORIDE SERPL-SCNC: 107 MMOL/L (ref 98–107)
COLOR UR: YELLOW
CREAT SERPL-MCNC: 1.09 MG/DL (ref 0.55–1.02)
EOSINOPHIL # BLD AUTO: 0 10*3/UL (ref 0–0.4)
EOSINOPHIL # BLD AUTO: 0.6 % (ref 1–4)
EPI CELLS #/AREA URNS HPF: (no result) /[HPF]
ERYTHROCYTE [DISTWIDTH] IN BLOOD BY AUTOMATED COUNT: 16.2 % (ref 0–14.5)
GLUCOSE UR QL: NEGATIVE
HCT VFR BLD AUTO: 45.2 % (ref 37–47)
HGB BLD-MCNC: 14.1 G/DL (ref 12–16)
HGB UR QL STRIP: (no result)
INR BLD: 3.3 (ref 2–3.5)
KETONES UR QL STRIP: NEGATIVE
LEUKOCYTE ESTERASE UR QL STRIP: NEGATIVE
LIPASE SERPL-CCNC: 32 U/L (ref 73–393)
LYMPHOCYTES # BLD AUTO: 1.5 10*3/UL (ref 1.3–4.4)
LYMPHOCYTES NFR BLD AUTO: 20.9 % (ref 27–41)
MCH RBC QN AUTO: 33.2 PG (ref 27–31)
MCHC RBC AUTO-ENTMCNC: 31.2 G/DL (ref 33–37)
MCV RBC AUTO: 106.4 FL (ref 81–99)
MONOCYTES # BLD AUTO: 0.6 10*3/UL (ref 0.1–1)
MONOCYTES NFR BLD MANUAL: 8.8 % (ref 3–9)
NEUT #: 5 10*3/UL (ref 2.3–7.9)
NEUT %: 68.8 % (ref 47–73)
NITRITE UR QL STRIP: NEGATIVE
NRBC BLD QL AUTO: 0 10*3/UL (ref 0–0)
PH UR STRIP: 7.5 [PH] (ref 5–9)
PLATELET # BLD AUTO: 223 10*3/UL (ref 130–400)
PMV BLD AUTO: 10.5 FL (ref 9.6–12.3)
POTASSIUM SERPL-SCNC: 3.6 MMOL/L (ref 3.5–5.1)
PROT SERPL-MCNC: 7.4 GM/DL (ref 6.4–8.2)
RBC # BLD AUTO: 4.25 10*6/UL (ref 4.1–5.1)
SODIUM SERPL-SCNC: 143 MMOL/L (ref 136–145)
SP GR UR: 1.01 (ref 1–1.03)
TROPONIN I SERPL-MCNC: 0.03 NG/ML (ref ?–0.04)
UROBILINOGEN UR STRIP-MCNC: 0.2 E.U./DL (ref 0.2–1)
WBC NRBC COR # BLD AUTO: 7.2 10*3/UL (ref 4.8–10.8)

## 2020-02-20 NOTE — TELEPHONE ENCOUNTER
BNP over 5000 indicating significant volume overload. Go with the diuretic dosing that I gave her yesterday. Rest of her labs looked okay. I do want to repeat electrolytes in 1 week with the initiation of diuretic.   Order is in'

## 2020-02-20 NOTE — TELEPHONE ENCOUNTER
Spoke to the daughter of Mando Mackenzie, they actually just called an ambulance for her. According to the daughter she is doubled over in pain and she can't stop peeing. I told them they I would let you know. She will call with any updates.     Thanks  Rolando Smith

## 2020-02-20 NOTE — PROGRESS NOTES
tablet by mouth daily, Disp: 30 tablet, Rfl: 1    oxyCODONE-acetaminophen (PERCOCET)  MG per tablet, Take 1 tablet by mouth 3 times daily as needed for Pain for up to 30 days. , Disp: 90 tablet, Rfl: 0    omeprazole (PRILOSEC) 20 MG delayed release capsule, Take 1 capsule by mouth every morning (before breakfast), Disp: 30 capsule, Rfl: 5    furosemide (LASIX) 20 MG tablet, Take 1 tablet by mouth daily as needed (edema), Disp: 30 tablet, Rfl: 1    FLUoxetine (PROZAC) 20 MG tablet, 1/2 pill po daily (Patient taking differently: 10 mg 1/2 pill po daily), Disp: 15 tablet, Rfl: 5    isosorbide mononitrate (IMDUR) 30 MG extended release tablet, Take 1 tablet by mouth daily Take morning of surgery with a sip of water, Disp: 30 tablet, Rfl: 5    atorvastatin (LIPITOR) 40 MG tablet, Take 1 tablet by mouth daily, Disp: 30 tablet, Rfl: 5    potassium chloride (KLOR-CON) 20 MEQ packet, Take 20 mEq by mouth daily, Disp: 30 each, Rfl: 5    warfarin (COUMADIN) 5 MG tablet, Take 1 tablet by mouth daily Take 1 po qd or as instructed by PCP, Disp: 30 tablet, Rfl: 5    lisinopril (PRINIVIL;ZESTRIL) 10 MG tablet, Take 1 tablet by mouth every evening (Patient taking differently: Take 10 mg by mouth daily ), Disp: 30 tablet, Rfl: 3    vitamin B-12 (CYANOCOBALAMIN) 500 MCG tablet, Take 1,000 mcg by mouth daily Ld 9-12-17, Disp: , Rfl:   No Known Allergies    Past Medical History:   Diagnosis Date    AAA (abdominal aortic aneurysm) (HCC)     Anticoagulant long-term use 5/29/2019    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 No signs of acute distress present. Neck: Supple and symmetric. Palpation reveals no adenopathy. No masses appreciated. Thyroid exhibits no nodules  or thyromegaly. No JVD. Carotids: 2+ and equal bilaterally, without bruits. Resp: Rhonchi appreciated over the lungs bilaterally, but no rales or wheezes appreciated over the lungs bilaterally.  Prolonged expiratory phase  CV: Rate is regular. Rhythm is regular. S1 is normal. S2 is normal. No gallop or rubs. No heart murmur  appreciated. Extremities: 2-3+ pitting edema, but no clubbing or cyanosis-left greater than right. Left side is typically larger than right  Abdomen: Bowel sounds are normoactive. Palpation of the abdomen reveals softness, but no distension,  organomegaly or tenderness. No abdominal masses. No palpable hepatosplenomegaly. Musculo: Patient arrived at office in wheelchair  Upper  Extremities: Full ROM bilaterally. Lower Extremities: Full ROM bilaterally/. She does have some reproducible  tenderness along the lower thoracic upper lumbar region to palpation. No gross deformities noted. Pulses are  palpable. Psych: Patient's attitude is cooperative. Patient's affect is appropriate. Judgement is realistic. Insight is appropriate. Neurologically grossly intact without focal deficits noted         Assessment and Plan:  Colette Shin was seen today for office visit for anticoagulation management, other and hypertension. Diagnoses and all orders for this visit:    Hypoxia  -     XR CHEST STANDARD (2 VW); Future    Arterial embolism (HCC)  -     POCT INR  On anticoagulant    Edema, unspecified type  -     BRAIN NATRIURETIC PEPTIDE; Future  -     bumetanide (BUMEX) 1 MG tablet; Take 1 tablet by mouth daily  We will start Bumex    Anticoagulant long-term use  Stable without bleeding    Chronic pain syndrome  Stable on her oxycodone/acetaminophen. Essential hypertension  -     Basic Metabolic Panel;  Future  Stable    Coronary artery disease involving native coronary artery of native heart without angina pectoris  Follow-up with cardiology    Chronic obstructive pulmonary disease, unspecified COPD type [facility])  May refer to pulmonary    Macrocytosis  Stable and following with hematology/oncology. Plan: I will see back in 3 weeks and as needed. We will recheck her INR at that time. We will also probably repeat some blood work on her diuretic. Started Bumex 1 mg twice a day for 3 days then 1 mg daily thereafter. Next x-ray was are visualized. Blood work including BMP and BNP. Fall precautions. Stockings recommended. Excision use as directed. Prescription management performed. Notify us of problems in the interim. Check on nebulizer status. Set up PFTs at next visit. Possible pulmonary referral.  Return in about 3 weeks (around 3/11/2020). Seen By:  José Cordova MD      *Document was created using voice recognition software. Note was reviewed however may contain grammatical errors.

## 2020-02-29 ENCOUNTER — HOSPITAL ENCOUNTER (EMERGENCY)
Dept: HOSPITAL 83 - ED | Age: 85
Discharge: HOME | End: 2020-02-29
Payer: MEDICARE

## 2020-02-29 VITALS — HEIGHT: 65 IN | BODY MASS INDEX: 29.16 KG/M2 | WEIGHT: 175 LBS

## 2020-02-29 VITALS — DIASTOLIC BLOOD PRESSURE: 74 MMHG | SYSTOLIC BLOOD PRESSURE: 142 MMHG

## 2020-02-29 DIAGNOSIS — Z86.718: ICD-10-CM

## 2020-02-29 DIAGNOSIS — B37.2: ICD-10-CM

## 2020-02-29 DIAGNOSIS — Z79.01: ICD-10-CM

## 2020-02-29 DIAGNOSIS — E78.00: ICD-10-CM
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
## 2020-02-29 DIAGNOSIS — M19.90: ICD-10-CM

## 2020-02-29 DIAGNOSIS — I11.0: ICD-10-CM

## 2020-02-29 DIAGNOSIS — I50.9: ICD-10-CM

## 2020-02-29 DIAGNOSIS — Z79.899: ICD-10-CM

## 2020-02-29 DIAGNOSIS — G89.29: ICD-10-CM

## 2020-02-29 DIAGNOSIS — T83.511A: Primary | ICD-10-CM
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
## 2020-02-29 DIAGNOSIS — N39.0: ICD-10-CM

## 2020-02-29 LAB
ALBUMIN SERPL-MCNC: 3.1 G/DL
ALBUMIN SERPL-MCNC: 3.1 GM/DL (ref 3.1–4.5)
ALP BLD-CCNC: 71 U/L
ALP SERPL-CCNC: 71 U/L (ref 45–117)
ALT SERPL W P-5'-P-CCNC: 17 U/L (ref 12–78)
ALT SERPL-CCNC: 17 U/L
ANION GAP SERPL CALCULATED.3IONS-SCNC: NORMAL MMOL/L
APPEARANCE UR: (no result)
AST SERPL-CCNC: 26 IU/L (ref 3–35)
AST SERPL-CCNC: 26 U/L
BACTERIA #/AREA URNS HPF: (no result) /[HPF]
BASOPHILS # BLD AUTO: 0 10*3/UL (ref 0–0.1)
BASOPHILS ABSOLUTE: 0 /ΜL
BASOPHILS NFR BLD AUTO: 0.2 % (ref 0–1)
BASOPHILS RELATIVE PERCENT: 0.2 %
BILIRUB SERPL-MCNC: 1.4 MG/DL (ref 0.1–1.4)
BILIRUB UR QL STRIP: NEGATIVE
BILIRUBIN, URINE: NEGATIVE
BLOOD, URINE: POSITIVE
BUN BLDV-MCNC: 14 MG/DL
BUN SERPL-MCNC: 14 MG/DL (ref 7–24)
CALCIUM SERPL-MCNC: 8.4 MG/DL
CHLORIDE BLD-SCNC: 106 MMOL/L
CHLORIDE SERPL-SCNC: 106 MMOL/L (ref 98–107)
CLARITY: ABNORMAL
CO2: 26 MMOL/L
COLOR UR: YELLOW
COLOR: YELLOW
CREAT SERPL-MCNC: 1.06 MG/DL
CREAT SERPL-MCNC: 1.06 MG/DL (ref 0.55–1.02)
EOSINOPHIL # BLD AUTO: 0 10*3/UL (ref 0–0.4)
EOSINOPHIL # BLD AUTO: 0.2 % (ref 1–4)
EOSINOPHILS ABSOLUTE: 0 /ΜL
EOSINOPHILS RELATIVE PERCENT: 0.2 %
ERYTHROCYTE [DISTWIDTH] IN BLOOD BY AUTOMATED COUNT: 16.2 % (ref 0–14.5)
GFR CALCULATED: 49
GLUCOSE BLD-MCNC: 102 MG/DL
GLUCOSE UR QL: NEGATIVE
GLUCOSE URINE: NEGATIVE
HCT VFR BLD AUTO: 41.5 % (ref 37–47)
HCT VFR BLD CALC: 41.5 % (ref 36–46)
HEMOGLOBIN: 13 G/DL (ref 12–16)
HGB BLD-MCNC: 13 G/DL (ref 12–16)
HGB UR QL STRIP: (no result)
INR BLD: 3.8
INR BLD: 3.8 (ref 2–3.5)
KETONES UR QL STRIP: (no result)
KETONES, URINE: POSITIVE
LEUKOCYTE ESTERASE UR QL STRIP: (no result)
LEUKOCYTE ESTERASE, URINE: POSITIVE
LYMPHOCYTES # BLD AUTO: 1.1 10*3/UL (ref 1.3–4.4)
LYMPHOCYTES ABSOLUTE: 1.1 /ΜL
LYMPHOCYTES NFR BLD AUTO: 12.1 % (ref 27–41)
LYMPHOCYTES RELATIVE PERCENT: 12.1 %
MCH RBC QN AUTO: 33.1 PG
MCH RBC QN AUTO: 33.1 PG (ref 27–31)
MCHC RBC AUTO-ENTMCNC: 31.3 G/DL
MCHC RBC AUTO-ENTMCNC: 31.3 G/DL (ref 33–37)
MCV RBC AUTO: 105.6 FL
MCV RBC AUTO: 105.6 FL (ref 81–99)
MONOCYTES # BLD AUTO: 0.9 10*3/UL (ref 0.1–1)
MONOCYTES ABSOLUTE: 0.9 /ΜL
MONOCYTES NFR BLD MANUAL: 9.8 % (ref 3–9)
MONOCYTES RELATIVE PERCENT: 9.8 %
NEUT #: 7.1 10*3/UL (ref 2.3–7.9)
NEUT %: 77.2 % (ref 47–73)
NEUTROPHILS ABSOLUTE: 7.1 /ΜL
NEUTROPHILS RELATIVE PERCENT: 77.2 %
NITRITE UR QL STRIP: POSITIVE
NITRITE, URINE: POSITIVE
NRBC BLD QL AUTO: 0 % (ref 0–0)
PH UA: 6.5 (ref 4.5–8)
PH UR STRIP: 6.5 [PH] (ref 5–9)
PLATELET # BLD AUTO: 209 10*3/UL (ref 130–400)
PLATELET # BLD: 209 K/ΜL
PMV BLD AUTO: 10.8 FL
PMV BLD AUTO: 10.8 FL (ref 9.6–12.3)
POTASSIUM SERPL-SCNC: 3.8 MMOL/L
POTASSIUM SERPL-SCNC: 3.8 MMOL/L (ref 3.5–5.1)
PROT SERPL-MCNC: 7 GM/DL (ref 6.4–8.2)
PROTEIN UA: POSITIVE
PROTIME: 38.7 SECONDS
RBC # BLD AUTO: 3.93 10*6/UL (ref 4.1–5.1)
RBC # BLD: 3.93 10^6/ΜL
RBC #/AREA URNS HPF: (no result) RBC/HPF (ref 0–2)
SODIUM BLD-SCNC: 141 MMOL/L
SODIUM SERPL-SCNC: 141 MMOL/L (ref 136–145)
SP GR UR: 1.02 (ref 1–1.03)
SPECIFIC GRAVITY, URINE: 1.02
TOTAL PROTEIN: 7
UROBILINOGEN UR STRIP-MCNC: 8 E.U./DL (ref 0.2–1)
UROBILINOGEN, URINE: 8
WBC # BLD: 9.2 10^3/ML
WBC #/AREA URNS HPF: (no result) WBC/HPF (ref 0–5)
WBC NRBC COR # BLD AUTO: 9.2 10*3/UL (ref 4.8–10.8)

## 2020-03-05 ENCOUNTER — OFFICE VISIT (OUTPATIENT)
Dept: FAMILY MEDICINE CLINIC | Age: 85
End: 2020-03-05
Payer: MEDICARE

## 2020-03-05 VITALS
HEART RATE: 66 BPM | TEMPERATURE: 97.2 F | DIASTOLIC BLOOD PRESSURE: 76 MMHG | HEIGHT: 64 IN | BODY MASS INDEX: 29.37 KG/M2 | WEIGHT: 172 LBS | OXYGEN SATURATION: 90 % | SYSTOLIC BLOOD PRESSURE: 118 MMHG

## 2020-03-05 LAB
INTERNATIONAL NORMALIZATION RATIO, POC: 2.7
PROTHROMBIN TIME, POC: 32.4

## 2020-03-05 PROCEDURE — G8417 CALC BMI ABV UP PARAM F/U: HCPCS | Performed by: INTERNAL MEDICINE

## 2020-03-05 PROCEDURE — 85610 PROTHROMBIN TIME: CPT | Performed by: INTERNAL MEDICINE

## 2020-03-05 PROCEDURE — G8427 DOCREV CUR MEDS BY ELIG CLIN: HCPCS | Performed by: INTERNAL MEDICINE

## 2020-03-05 PROCEDURE — 1090F PRES/ABSN URINE INCON ASSESS: CPT | Performed by: INTERNAL MEDICINE

## 2020-03-05 PROCEDURE — 3023F SPIROM DOC REV: CPT | Performed by: INTERNAL MEDICINE

## 2020-03-05 PROCEDURE — G8926 SPIRO NO PERF OR DOC: HCPCS | Performed by: INTERNAL MEDICINE

## 2020-03-05 PROCEDURE — 1036F TOBACCO NON-USER: CPT | Performed by: INTERNAL MEDICINE

## 2020-03-05 PROCEDURE — G8482 FLU IMMUNIZE ORDER/ADMIN: HCPCS | Performed by: INTERNAL MEDICINE

## 2020-03-05 PROCEDURE — 99215 OFFICE O/P EST HI 40 MIN: CPT | Performed by: INTERNAL MEDICINE

## 2020-03-05 PROCEDURE — 1123F ACP DISCUSS/DSCN MKR DOCD: CPT | Performed by: INTERNAL MEDICINE

## 2020-03-05 PROCEDURE — 4040F PNEUMOC VAC/ADMIN/RCVD: CPT | Performed by: INTERNAL MEDICINE

## 2020-03-05 RX ORDER — ALBUTEROL SULFATE 90 UG/1
2 AEROSOL, METERED RESPIRATORY (INHALATION) 3 TIMES DAILY
Qty: 1 INHALER | Refills: 5 | Status: SHIPPED
Start: 2020-03-05 | End: 2020-03-20 | Stop reason: ALTCHOICE

## 2020-03-05 RX ORDER — CEPHALEXIN 500 MG/1
500 CAPSULE ORAL 4 TIMES DAILY
COMMUNITY
End: 2020-03-20 | Stop reason: ALTCHOICE

## 2020-03-05 NOTE — PROGRESS NOTES
improvement.     REST OF PERTINENT ROS GONE OVER AND WAS NEGATIVE.      PMH:  Problem List: Chronic pain syndrome, Long-term current use of anticoagulant, Anticoagulant, Osteoarthritis, Taking  medication, Hyperlipidemia  Health Maintenance:  Mammogram - (2019)  Mammogram Screening - (2019)  Influenza Vaccination - (2018)  Bone Density Scan - (2011)  Colonoscopy - (2015)  Couseled on Home Safety - (2017)  Bone Density Test Screening - (2014)  Stress Test - -ok,3/11  EKG - ,11,3/13  Rectal Exam - 8/10  Breast Exam - 8/10,declined,  Hemmocult Cards - -neg x 3  2D ECHO - 8/15  Mini Mental Status - --  Medical Problems:  Osteoarthritis  DVT - right-  Hypertension  Lumbar Spondylosis-Herniated Disc-Spinal Stenosis - epidurals/[place]  Hyperlipidemia, YAIR-BSO--Non Cancerous  Small AAA - 3.4cm--- .--3.5 cm per CT urogram  HX FX Left Ankle, Chronic Greater Trochanteric Bursitis, Depression, Cataracts  Coronary Artery Disease (CAD) - Previously declined cardiology fu  neurogenic claudication  Spinal Stenosis - L2-5 lumbar laminectomy-dr massey,[facility]  Kidney Stones, Anxiety, Diverticulitis, Osteoporosis, post laminectomy syndrome lumbar spine  Goiter - multinodular  hematuria - cystoscopy/retrograde-worked up by urology. urethral stenosis - dialated  chronic urethral inflammation, strep bovis bacteremia, Congestive Heart Failure (CHF), arterial thrombosis right arm  T12 compression fracture. - Vertebroplasty  Lumbar Spondylosis-Herniated Disc-Spinal Stenosis  T11 and L1 kyphoplasty -   Dilated nonischemic cardiomyopathy - Follows with cardiology  copd  Reviewed and updated. FH:  Father:  MI -  late 63's. Mother:  Breast Cancer - ? ?  AGE 80. Son 1:  Chronic Obstructive Pulmonary Disease (COPD) -  age 61. Sister 1:  melanoma -  age in her 52's. Reviewed, no changes. SH:  Marital: .   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    Chronic obstructive pulmonary disease, unspecified COPD type ([facility] [address]  -     External Referral To Pulmonology  -     External Referral To Home Health  Starting albuterol puffer    Coronary artery disease involving native coronary artery of native heart without angina pectoris  Needs to get back with cardiology    Macrocytosis  Following with hematology oncology    Congestive heart failure, unspecified HF chronicity, unspecified heart failure type ([facility] [address]  See above    Other orders  -     albuterol sulfate  (90 Base) MCG/ACT inhaler; Inhale 2 puffs into the lungs three times daily    Plan: I will see her back at upcoming follow-up visit. Started Proventil inhaler for COPD. Answered all questions regarding this. Referred to pulmonary. Dr. Major Apley is who they are requesting. Follow-up with above consultants including urology regarding her catheter. Continue same dose Coumadin until I see her next week. They are to call cardiology and get reset up again. Set up home health with [facility] home health #[phone] increased dose of Lasix to 20 alternating with 40. We will repeat some blood work when I see her next. Reviewed all the documentation from 2 ER visits. Fall precautions. Notify us with problems in the interim. Was a time-consuming complex visit. This lasted greater than 40 minutes face-to-face time greater than 50% of that time spent in counseling and coordination of care reviewing records answering questions if leg swelling is not coming down in the next several days daughter is to let me know. Return keep appt. Seen By:  Disha Garcia MD      *Document was created using voice recognition software. Note was reviewed however may contain grammatical errors.

## 2020-03-06 ENCOUNTER — TELEPHONE (OUTPATIENT)
Dept: FAMILY MEDICINE CLINIC | Age: 85
End: 2020-03-06

## 2020-03-06 NOTE — TELEPHONE ENCOUNTER
JAROD FROM [facility] CALLED, SHE WANTS TO KNOW IF YOU WANT THEM TO MANAGE HER COUMADIN. ? PLEASE CONTACT HOME HEALTH IN REGARDS TO THIS.      Tricia Ruiz

## 2020-03-06 NOTE — TELEPHONE ENCOUNTER
That is fine as long as they are calling us early in the day with results. Check next INR next Thursday.

## 2020-03-09 RX ORDER — LISINOPRIL 10 MG/1
10 TABLET ORAL DAILY
Qty: 30 TABLET | Refills: 0 | Status: SHIPPED
Start: 2020-03-09 | End: 2020-03-20 | Stop reason: SDUPTHER

## 2020-03-09 NOTE — TELEPHONE ENCOUNTER
[id number] [address] [address] nurse calling in. She is currently at the patient's house and she is requesting this medication.

## 2020-03-16 ENCOUNTER — TELEPHONE (OUTPATIENT)
Dept: FAMILY MEDICINE CLINIC | Age: 85
End: 2020-03-16

## 2020-03-16 LAB
BACTERIA, URINE: ABNORMAL
BILIRUBIN: NEGATIVE
BLOOD: ABNORMAL
CLARITY: ABNORMAL
COLOR: YELLOW
EPITHELIAL CELLS, UA: ABNORMAL
GLUCOSE: NEGATIVE
KETONES: NEGATIVE
LEUKOCYTE ESTERASE, URINE: ABNORMAL
NITRITE, URINE: NEGATIVE
PH, URINE: 7 (ref 5–9)
PROTEIN UA: ABNORMAL
RBC UA: ABNORMAL RBC/HPF (ref 0–2)
SPECIFIC GRAVITY UA: 1.01 (ref 1–1.03)
UROBILINOGEN, URINE: 2 E.U./DL (ref 0.2–1)
WBC URINE: ABNORMAL WBC/HPF (ref 0–5)

## 2020-03-18 ENCOUNTER — TELEPHONE (OUTPATIENT)
Dept: FAMILY MEDICINE CLINIC | Age: 85
End: 2020-03-18

## 2020-03-18 LAB — URINE CULTURE, ROUTINE: ABNORMAL

## 2020-03-18 RX ORDER — NITROFURANTOIN 25; 75 MG/1; MG/1
100 CAPSULE ORAL 2 TIMES DAILY
Qty: 14 CAPSULE | Refills: 0 | Status: SHIPPED | OUTPATIENT
Start: 2020-03-18 | End: 2020-03-25

## 2020-03-18 NOTE — TELEPHONE ENCOUNTER
Spoke with the daughter Destiny Baez and she said Bailey Kerr has an appt with the urologist (Dr Keerthi Yen) on March 27th. Has an appt with a kidney doctor (per dr Vane Spivey) on April 9th.

## 2020-03-19 ENCOUNTER — ANTI-COAG VISIT (OUTPATIENT)
Dept: FAMILY MEDICINE CLINIC | Age: 85
End: 2020-03-19
Payer: MEDICARE

## 2020-03-19 LAB
INTERNATIONAL NORMALIZATION RATIO, POC: 2.6
PROTHROMBIN TIME, POC: 30.8

## 2020-03-19 PROCEDURE — 85610 PROTHROMBIN TIME: CPT | Performed by: INTERNAL MEDICINE

## 2020-03-20 ENCOUNTER — HOSPITAL ENCOUNTER (OUTPATIENT)
Age: 85
Discharge: HOME | End: 2020-03-22
Payer: MEDICARE

## 2020-03-20 ENCOUNTER — TELEPHONE (OUTPATIENT)
Dept: FAMILY MEDICINE CLINIC | Age: 85
End: 2020-03-20

## 2020-03-20 ENCOUNTER — OFFICE VISIT (OUTPATIENT)
Dept: FAMILY MEDICINE CLINIC | Age: 85
End: 2020-03-20
Payer: MEDICARE

## 2020-03-20 VITALS
TEMPERATURE: 98.7 F | HEIGHT: 64 IN | BODY MASS INDEX: 29.02 KG/M2 | SYSTOLIC BLOOD PRESSURE: 144 MMHG | HEART RATE: 106 BPM | DIASTOLIC BLOOD PRESSURE: 86 MMHG | OXYGEN SATURATION: 92 % | WEIGHT: 170 LBS

## 2020-03-20 LAB
ANION GAP SERPL CALCULATED.3IONS-SCNC: 12 MMOL/L (ref 7–16)
BUN BLDV-MCNC: 13 MG/DL (ref 8–23)
CALCIUM SERPL-MCNC: 9.3 MG/DL (ref 8.6–10.2)
CHLORIDE BLD-SCNC: 103 MMOL/L (ref 98–107)
CO2: 26 MMOL/L (ref 22–29)
CREAT SERPL-MCNC: 0.9 MG/DL (ref 0.5–1)
GFR AFRICAN AMERICAN: >60
GFR NON-AFRICAN AMERICAN: 59 ML/MIN/1.73
GLUCOSE BLD-MCNC: 87 MG/DL (ref 74–99)
POTASSIUM SERPL-SCNC: 4.3 MMOL/L (ref 3.5–5)
SODIUM BLD-SCNC: 141 MMOL/L (ref 132–146)

## 2020-03-20 PROCEDURE — 1036F TOBACCO NON-USER: CPT | Performed by: INTERNAL MEDICINE

## 2020-03-20 PROCEDURE — G8482 FLU IMMUNIZE ORDER/ADMIN: HCPCS | Performed by: INTERNAL MEDICINE

## 2020-03-20 PROCEDURE — 1123F ACP DISCUSS/DSCN MKR DOCD: CPT | Performed by: INTERNAL MEDICINE

## 2020-03-20 PROCEDURE — 80048 BASIC METABOLIC PNL TOTAL CA: CPT

## 2020-03-20 PROCEDURE — 1090F PRES/ABSN URINE INCON ASSESS: CPT | Performed by: INTERNAL MEDICINE

## 2020-03-20 PROCEDURE — 36415 COLL VENOUS BLD VENIPUNCTURE: CPT

## 2020-03-20 PROCEDURE — 99214 OFFICE O/P EST MOD 30 MIN: CPT | Performed by: INTERNAL MEDICINE

## 2020-03-20 PROCEDURE — G8417 CALC BMI ABV UP PARAM F/U: HCPCS | Performed by: INTERNAL MEDICINE

## 2020-03-20 PROCEDURE — G8428 CUR MEDS NOT DOCUMENT: HCPCS | Performed by: INTERNAL MEDICINE

## 2020-03-20 PROCEDURE — 4040F PNEUMOC VAC/ADMIN/RCVD: CPT | Performed by: INTERNAL MEDICINE

## 2020-03-20 RX ORDER — OXYBUTYNIN CHLORIDE 5 MG/1
5 TABLET ORAL DAILY
COMMUNITY
End: 2020-07-24 | Stop reason: ALTCHOICE

## 2020-03-20 RX ORDER — ATORVASTATIN CALCIUM 40 MG/1
40 TABLET, FILM COATED ORAL DAILY
Qty: 30 TABLET | Refills: 5 | Status: SHIPPED
Start: 2020-03-20 | End: 2020-09-09 | Stop reason: SDUPTHER

## 2020-03-20 RX ORDER — POTASSIUM CHLORIDE 1.5 G/1.77G
20 POWDER, FOR SOLUTION ORAL DAILY
Qty: 30 EACH | Refills: 5 | Status: SHIPPED
Start: 2020-03-20 | End: 2020-09-09 | Stop reason: SDUPTHER

## 2020-03-20 RX ORDER — ISOSORBIDE MONONITRATE 30 MG/1
30 TABLET, EXTENDED RELEASE ORAL DAILY
Qty: 30 TABLET | Refills: 5 | Status: SHIPPED
Start: 2020-03-20 | End: 2020-09-09 | Stop reason: SDUPTHER

## 2020-03-20 RX ORDER — WARFARIN SODIUM 5 MG/1
5 TABLET ORAL DAILY
Qty: 30 TABLET | Refills: 5 | Status: SHIPPED
Start: 2020-03-20 | End: 2020-09-09 | Stop reason: SDUPTHER

## 2020-03-20 RX ORDER — LISINOPRIL 10 MG/1
10 TABLET ORAL DAILY
Qty: 30 TABLET | Refills: 5 | Status: SHIPPED
Start: 2020-03-20 | End: 2020-09-09 | Stop reason: SDUPTHER

## 2020-03-20 RX ORDER — FUROSEMIDE 20 MG/1
20 TABLET ORAL DAILY
Qty: 30 TABLET | Refills: 5 | Status: SHIPPED
Start: 2020-03-20 | End: 2020-09-09 | Stop reason: SDUPTHER

## 2020-03-20 RX ORDER — OXYCODONE AND ACETAMINOPHEN 10; 325 MG/1; MG/1
1 TABLET ORAL 3 TIMES DAILY PRN
Qty: 90 TABLET | Refills: 0 | Status: SHIPPED | OUTPATIENT
Start: 2020-03-20 | End: 2020-04-20 | Stop reason: SDUPTHER

## 2020-03-20 RX ORDER — FLUOXETINE 20 MG/1
10 TABLET, FILM COATED ORAL DAILY
Qty: 30 TABLET | Refills: 5 | Status: SHIPPED
Start: 2020-03-20 | End: 2020-07-24 | Stop reason: ALTCHOICE

## 2020-03-20 NOTE — PROGRESS NOTES
Problem: Adult Inpatient Plan of Care  Goal: Plan of Care Review  Outcome: Ongoing (interventions implemented as appropriate)  Pt remains on RA. No resp distress noted.       vomiting.         Endocrine: Negative.    Genitourinary: Negative for difficulty urinating, dysuria, hematuria and urgency.         Reports frequent UTIs .does have some incontinence of urine following with urology. Ashley Esqueda hematuria has been worked up. She does currently have a urinary catheter placed by urology  Musculoskeletal: Positive for arthralgias.  back pain, and gait problem       Chronic pain syndrome/back pain--lower back pain and bilateral hip pain/chronic-continues on narcotic pain medication with success. T11-L1 vertebroplasty. Successful.   Skin: Negative.    Neurological: Negative for dizziness, weakness, light-headedness, numbness and headaches. Hematological: Negative.    Psychiatric/Behavioral: Admits to being more emotional and angry since taken off of her Prozac.-Placed back on again with improvement. REST OF PERTINENT ROS GONE OVER AND WAS NEGATIVE.        PMH:  Problem List: Chronic pain syndrome, Long-term current use of anticoagulant, Anticoagulant, Osteoarthritis, Taking  medication, Hyperlipidemia  Health Maintenance:  Mammogram - (1/23/2019)  Mammogram Screening - (1/23/2019)  Influenza Vaccination - (11/21/2018)  Bone Density Scan - (1/11/2011)  Colonoscopy - (11/12/2015)  Couseled on Home Safety - (4/12/2017)  Bone Density Test Screening - (5/23/2014)  Stress Test - 1/08-ok,3/11  EKG - 7/09,11,3/13  Rectal Exam - 8/10  Breast Exam - 8/10,declined,2/14  Hemmocult Cards - 2/13-neg x 3  2D ECHO - 8/15  Mini Mental Status - 4/19--29/30  Medical Problems:  Osteoarthritis  DVT - right-1/04  Hypertension  Lumbar Spondylosis-Herniated Disc-Spinal Stenosis - epidurals/[place]  Hyperlipidemia, YAIR-BSO--Non Cancerous  Small AAA - 3.4cm--- 2/19.--3.5 cm per CT urogram  HX FX Left Ankle, Chronic Greater Trochanteric Bursitis, Depression, Cataracts  Coronary Artery Disease (CAD) - Previously declined cardiology fu  neurogenic claudication  [facility]  Kidney Stones, Anxiety, Diverticulitis, Osteoporosis, post laminectomy syndrome lumbar spine  Goiter - multinodular  hematuria - cystoscopy/retrograde-worked up by urology. urethral stenosis - dialated  chronic urethral inflammation, strep bovis bacteremia, Congestive Heart Failure (CHF), arterial thrombosis right arm  T12 compression fracture. - Vertebroplasty  Lumbar Spondylosis-Herniated Disc-Spinal Stenosis  T11 and L1 kyphoplasty -   Dilated nonischemic cardiomyopathy - Follows with cardiology  copd  Reviewed and updated. FH:  Father:  MI -  late 63's. Mother:  Breast Cancer - ? ?  AGE 80. Son 1:  Chronic Obstructive Pulmonary Disease (COPD) -  age 61. Sister 1:  melanoma -  age in her 52's. Reviewed, no changes. SH:  Marital: . Personal Habits: Cigarette Use: Does Not Smoke. Alcohol: does not use alcohol. Reviewed, no changes.         Current Outpatient Medications:     oxybutynin (DITROPAN) 5 MG tablet, Take 5 mg by mouth daily, Disp: , Rfl:     atorvastatin (LIPITOR) 40 MG tablet, Take 1 tablet by mouth daily, Disp: 30 tablet, Rfl: 5    FLUoxetine (PROZAC) 20 MG tablet, Take 0.5 tablets by mouth daily 1/2 pill po daily, Disp: 30 tablet, Rfl: 5    isosorbide mononitrate (IMDUR) 30 MG extended release tablet, Take 1 tablet by mouth daily Take morning of surgery with a sip of water, Disp: 30 tablet, Rfl: 5    warfarin (COUMADIN) 5 MG tablet, Take 1 tablet by mouth daily Take 1 po qd or as instructed by PCP, Disp: 30 tablet, Rfl: 5    lisinopril (PRINIVIL;ZESTRIL) 10 MG tablet, Take 1 tablet by mouth daily, Disp: 30 tablet, Rfl: 5    furosemide (LASIX) 20 MG tablet, Take 1 tablet by mouth daily, Disp: 30 tablet, Rfl: 5    oxyCODONE-acetaminophen (PERCOCET)  MG per tablet, Take 1 tablet by mouth 3 times daily as needed for Pain for up to 30 days. , Disp: 90 tablet, Rfl: 0    potassium chloride (KLOR-CON) 20 MEQ packet, Take 20 mEq by mouth daily, Disp: 30 each, Rfl: 5    nitrofurantoin, macrocrystal-monohydrate, (MACROBID) 100 MG capsule, Take 1 capsule by mouth 2 times daily for 7 days, Disp: 14 capsule, Rfl: 0    vitamin B-12 (CYANOCOBALAMIN) 500 MCG tablet, Take 1,000 mcg by mouth daily Ld 9-12-17, Disp: , Rfl:   No Known Allergies    Past Medical History:   Diagnosis Date    AAA [facility]abdominal aortic aneurysm) ([facility]     Anticoagulant long-term use 5/29/2019    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)     CAD (coronary artery disease)     CHF (congestive heart failure) ([facility] [address] [facility]     Chronic pain syndrome 5/29/2019    HTN (hypertension)     Hx of blood clots 2015    right upper arm    Hyperlipemia      Past Surgical History:   Procedure Laterality Date    BACK SURGERY  2010    [facility] for [facility]atic[facility], pt unsure    COLONOSCOPY      EYE SURGERY Bilateral     cataracts    FIXATION KYPHOPLASTY  10/02/2017    T12 kyphoplasty Dr Sheron Garrett KYPHOPLASTY  11/20/2017    T11-L1 kypho    HYSTERECTOMY      HYSTERECTOMY, TOTAL ABDOMINAL      TONSILLECTOMY      TRANSESOPHAGEAL ECHOCARDIOGRAM  11/04/15     Family History   Problem Relation Age of Onset    Breast Cancer Mother [de-identified]    Heart Disease Father     Cancer Sister         melanoma     Social History     Socioeconomic History    Marital status:      Spouse name: Not on file    Number of children: Not on file    Years of education: Not on file    Highest education level: Not on file   Occupational History    Not on file   Social Needs    Financial resource strain: Patient refused    Food insecurity     Worry: Patient refused     Inability: Patient refused    Transportation needs     Medical: Patient refused     Non-medical: Patient refused   Tobacco Use    Smoking status: Former Smoker    Smokeless tobacco: Never Used    Tobacco comment: quit about 30 yrs ago   Substance and Sexual Activity    Alcohol use: No    Drug use: No    Sexual activity: Not on file some reproducible  tenderness along the lower thoracic upper lumbar region to palpation. No gross deformities noted. Pulses are  palpable. Psych: Patient's attitude is cooperative. Patient's affect is appropriate. Judgement is realistic. Insight is appropriate. Neurologically grossly intact without focal deficits noted         Assessment and Plan:  Tracey Mcnulty was seen today for edema and office visit for anticoagulation management. Diagnoses and all orders for this visit:    Edema, unspecified type    Current episode of major depressive disorder without prior episode, unspecified depression episode severity  -     FLUoxetine (PROZAC) 20 MG tablet; Take 0.5 tablets by mouth daily 1/2 pill po daily    Essential hypertension  -     furosemide (LASIX) 20 MG tablet; Take 1 tablet by mouth daily  -     Basic Metabolic Panel; Future    Chronic pain syndrome  -     oxyCODONE-acetaminophen (PERCOCET)  MG per tablet; Take 1 tablet by mouth 3 times daily as needed for Pain for up to 30 days. Compression fracture of body of thoracic vertebra (HCC)  -     oxyCODONE-acetaminophen (PERCOCET)  MG per tablet; Take 1 tablet by mouth 3 times daily as needed for Pain for up to 30 days. Chronic, continuous use of opioids  -     oxyCODONE-acetaminophen (PERCOCET)  MG per tablet; Take 1 tablet by mouth 3 times daily as needed for Pain for up to 30 days. Anticoagulant long-term use    Coronary artery disease involving native coronary artery of native heart without angina pectoris    Other orders  -     atorvastatin (LIPITOR) 40 MG tablet; Take 1 tablet by mouth daily  -     isosorbide mononitrate (IMDUR) 30 MG extended release tablet; Take 1 tablet by mouth daily Take morning of surgery with a sip of water  -     warfarin (COUMADIN) 5 MG tablet; Take 1 tablet by mouth daily Take 1 po qd or as instructed by PCP  -     lisinopril (PRINIVIL;ZESTRIL) 10 MG tablet;  Take 1 tablet by mouth daily  -     potassium

## 2020-04-03 ENCOUNTER — TELEPHONE (OUTPATIENT)
Dept: FAMILY MEDICINE CLINIC | Age: 85
End: 2020-04-03

## 2020-04-07 ENCOUNTER — ANTI-COAG VISIT (OUTPATIENT)
Dept: FAMILY MEDICINE CLINIC | Age: 85
End: 2020-04-07

## 2020-04-07 LAB — INR BLD: 2.3

## 2020-04-20 ENCOUNTER — OFFICE VISIT (OUTPATIENT)
Dept: PRIMARY CARE CLINIC | Age: 85
End: 2020-04-20
Payer: MEDICARE

## 2020-04-20 VITALS
BODY MASS INDEX: 25.88 KG/M2 | TEMPERATURE: 98.2 F | OXYGEN SATURATION: 93 % | DIASTOLIC BLOOD PRESSURE: 64 MMHG | HEIGHT: 64 IN | WEIGHT: 151.6 LBS | SYSTOLIC BLOOD PRESSURE: 114 MMHG | HEART RATE: 65 BPM

## 2020-04-20 LAB
INTERNATIONAL NORMALIZATION RATIO, POC: 2
PROTHROMBIN TIME, POC: 23.6

## 2020-04-20 PROCEDURE — 1090F PRES/ABSN URINE INCON ASSESS: CPT | Performed by: INTERNAL MEDICINE

## 2020-04-20 PROCEDURE — 1123F ACP DISCUSS/DSCN MKR DOCD: CPT | Performed by: INTERNAL MEDICINE

## 2020-04-20 PROCEDURE — 99214 OFFICE O/P EST MOD 30 MIN: CPT | Performed by: INTERNAL MEDICINE

## 2020-04-20 PROCEDURE — 1036F TOBACCO NON-USER: CPT | Performed by: INTERNAL MEDICINE

## 2020-04-20 PROCEDURE — 85610 PROTHROMBIN TIME: CPT | Performed by: INTERNAL MEDICINE

## 2020-04-20 PROCEDURE — 4040F PNEUMOC VAC/ADMIN/RCVD: CPT | Performed by: INTERNAL MEDICINE

## 2020-04-20 PROCEDURE — G8427 DOCREV CUR MEDS BY ELIG CLIN: HCPCS | Performed by: INTERNAL MEDICINE

## 2020-04-20 PROCEDURE — G8417 CALC BMI ABV UP PARAM F/U: HCPCS | Performed by: INTERNAL MEDICINE

## 2020-04-20 RX ORDER — OXYCODONE AND ACETAMINOPHEN 10; 325 MG/1; MG/1
1 TABLET ORAL 3 TIMES DAILY PRN
Qty: 90 TABLET | Refills: 0 | Status: SHIPPED | OUTPATIENT
Start: 2020-04-20 | End: 2020-05-20 | Stop reason: SDUPTHER

## 2020-04-20 NOTE — PROGRESS NOTES
[address] [facility]     20  Dandre Stauffer : 3/1/1932 Sex: female  Age: 80 y.o. Chief Complaint   Patient presents with    Edema       HPI    Patient presents today in wheelchair unaccompanied. This was 1 month follow-up visit as well as INR check and narcotic refill per patient request.  She gets her hydrocodone/acetaminophen secondary to chronic pain syndrome/failed back syndrome. States the medication does give her relief. She still has a lot of pain but could not manage at all without the medication. She is requesting refill today. Oarrs report was run and okay. No evidence of abuse or diversion of the medication. INR was done today and was 2.0. She is therapeutic. No bleeding or thrombotic episodes. She is compliant with the medication. She has lost significant weight recently most likely most of it related to fluid loss. Legs are much thinner. Stopped Her Oxybutynin that was given to her by urology states it gave her severely dry mouth and she could not stand it. She has not seen any of her consultants since I saw her last.  With this COVID-19 pandemic situation she does not have scheduled appointments right now. She states they told her they would call to reschedule. Blood pressure has been okay. Today's number was good. She is supposed to be following with urology, podiatry, pulmonary, renal and recommendation to have her see cardiology for which she is overdue    Review of Systems     Constitutional:  negative for activity change, chills, diaphoresis,  and fever. HENT: Negative for congestion, ear pain, hearing loss, postnasal drip, rhinorrhea and sinus pain.    Respiratory: Positive for shortness of breath.-Stable negative for cough and wheezing.         COPD chronic oxygen use at night and as needed  Cardiovascular: Negative for chest pain, palpitations.  Positive for leg swelling.  -Improved from last couple visits.   Gastrointestinal: Positive for constipation.   Negative for abdominal pain, blood in stool, diarrhea,  and vomiting.         Endocrine: Negative.    Genitourinary: Negative for difficulty urinating, dysuria, hematuria and urgency.         Reports frequent UTIs .does have some incontinence of urine following with urology. Jane Quest hematuria has been worked up.  She no longer has urinary catheter. Musculoskeletal: Positive for arthralgias.  back pain, and gait problem       Chronic pain syndrome/back pain--lower back pain and bilateral hip pain/chronic-continues on narcotic pain medication with success. T11-L1 vertebroplasty. Successful.   Skin: Negative.    Neurological: Negative for dizziness, weakness, light-headedness, numbness and headaches. Hematological: Negative.    Psychiatric/Behavioral: Admits to being more emotional and angry since taken off of her Prozac.-Placed back on again with improvement. REST OF PERTINENT ROS GONE OVER AND WAS NEGATIVE.      PMH:  Problem List: Chronic pain syndrome, Long-term current use of anticoagulant, Anticoagulant, Osteoarthritis, Taking  medication, Hyperlipidemia  Health Maintenance:  Mammogram - (1/23/2019)  Mammogram Screening - (1/23/2019)  Influenza Vaccination - (11/21/2018)  Bone Density Scan - (1/11/2011)  Colonoscopy - (11/12/2015)  Couseled on Home Safety - (4/12/2017)  Bone Density Test Screening - (5/23/2014)  Stress Test - 1/08-ok,3/11  EKG - 7/09,11,3/13  Rectal Exam - 8/10  Breast Exam - 8/10,declined,2/14  Hemmocult Cards - 2/13-neg x 3  2D ECHO - 8/15  Mini Mental Status - 4/19--29/30  Medical Problems:  Osteoarthritis  DVT - right-1/04  Hypertension  Lumbar Spondylosis-Herniated Disc-Spinal Stenosis - epidurals/[place]  Hyperlipidemia, YAIR-BSO--Non Cancerous  Small AAA - 3.4cm--- 2/19.--3.5 cm per CT urogram  HX FX Left Ankle, Chronic Greater Trochanteric Bursitis, Depression, Cataracts  Coronary Artery Disease (CAD) - Previously declined cardiology fu  neurogenic claudication  Spinal Stenosis - on file   Relationships    Social connections     Talks on phone: Not on file     Gets together: Not on file     Attends [employer] service: Not on file     Active member of club or organization: Not on file     Attends meetings of clubs or organizations: Not on file     Relationship status: Not on file    Intimate partner violence     Fear of current or ex partner: Not on file     Emotionally abused: Not on file     Physically abused: Not on file     Forced sexual activity: Not on file   Other Topics Concern    Not on file   Social History Narrative    Not on file       Vitals:    04/20/20 1414   BP: 114/64   Pulse: 65   Temp: 98.2 °F (36.8 °C)   TempSrc: Temporal   SpO2: 93%   Weight: 151 lb 9.6 oz (68.8 kg)   Height: 5' 4\" (1.626 m)       Physical Exam    Const: Appears well developed and well nourished. No signs of acute distress present. Neck: Supple and symmetric. Palpation reveals no adenopathy. No masses appreciated. Thyroid exhibits no nodules  or thyromegaly. No JVD. Carotids: 2+ and equal bilaterally, without bruits. Resp: Rhonchi, rales or wheezes appreciated over the lungs bilaterally.  Prolonged expiratory phase  CV: Rate is regular. Rhythm is regular. S1 is normal. S2 is normal. No gallop or rubs. No heart murmur  appreciated. Extremities: 2+ pitting edema left leg and 1+ pitting edema right leg, but no clubbing or cyanosis-left greater than right. Left side is typically larger than right  Abdomen: Bowel sounds are normoactive. Palpation of the abdomen reveals softness, but no distension,  organomegaly or tenderness. No abdominal masses. No palpable hepatosplenomegaly. Musculo: Patient arrived at office in wheelchair  Upper  Extremities: Full ROM bilaterally. Lower Extremities: Limited range of motion. swelling noted./.  With improvement. She does have some reproducible  tenderness along the lower thoracic upper lumbar region to palpation. No gross deformities noted.  Pulses

## 2020-04-21 ENCOUNTER — TELEPHONE (OUTPATIENT)
Dept: FAMILY MEDICINE CLINIC | Age: 85
End: 2020-04-21

## 2020-05-20 ENCOUNTER — OFFICE VISIT (OUTPATIENT)
Dept: FAMILY MEDICINE CLINIC | Age: 85
End: 2020-05-20
Payer: MEDICARE

## 2020-05-20 ENCOUNTER — HOSPITAL ENCOUNTER (OUTPATIENT)
Age: 85
Discharge: HOME | End: 2020-05-22
Payer: MEDICARE

## 2020-05-20 VITALS
HEART RATE: 71 BPM | TEMPERATURE: 97.6 F | WEIGHT: 152.4 LBS | HEIGHT: 64 IN | SYSTOLIC BLOOD PRESSURE: 136 MMHG | DIASTOLIC BLOOD PRESSURE: 78 MMHG | OXYGEN SATURATION: 94 % | BODY MASS INDEX: 26.02 KG/M2

## 2020-05-20 LAB
ALBUMIN SERPL-MCNC: 3.6 G/DL (ref 3.5–5.2)
ALP BLD-CCNC: 100 U/L (ref 35–104)
ALT SERPL-CCNC: 11 U/L (ref 0–32)
ANION GAP SERPL CALCULATED.3IONS-SCNC: 14 MMOL/L (ref 7–16)
AST SERPL-CCNC: 24 U/L (ref 0–31)
BASOPHILS ABSOLUTE: 0.03 E9/L (ref 0–0.2)
BASOPHILS RELATIVE PERCENT: 0.5 % (ref 0–2)
BILIRUB SERPL-MCNC: 1.2 MG/DL (ref 0–1.2)
BUN BLDV-MCNC: 16 MG/DL (ref 8–23)
CALCIUM SERPL-MCNC: 9.4 MG/DL (ref 8.6–10.2)
CHLORIDE BLD-SCNC: 106 MMOL/L (ref 98–107)
CHOLESTEROL, TOTAL: 106 MG/DL (ref 0–199)
CO2: 20 MMOL/L (ref 22–29)
CREAT SERPL-MCNC: 1 MG/DL (ref 0.5–1)
EOSINOPHILS ABSOLUTE: 0.05 E9/L (ref 0.05–0.5)
EOSINOPHILS RELATIVE PERCENT: 0.8 % (ref 0–6)
GFR AFRICAN AMERICAN: >60
GFR NON-AFRICAN AMERICAN: 52 ML/MIN/1.73
GLUCOSE BLD-MCNC: 106 MG/DL (ref 74–99)
HCT VFR BLD CALC: 45.6 % (ref 34–48)
HDLC SERPL-MCNC: 34 MG/DL
HEMOGLOBIN: 13.8 G/DL (ref 11.5–15.5)
IMMATURE GRANULOCYTES #: 0.03 E9/L
IMMATURE GRANULOCYTES %: 0.5 % (ref 0–5)
INTERNATIONAL NORMALIZATION RATIO, POC: 4
LDL CHOLESTEROL CALCULATED: 52 MG/DL (ref 0–99)
LYMPHOCYTES ABSOLUTE: 0.93 E9/L (ref 1.5–4)
LYMPHOCYTES RELATIVE PERCENT: 14.6 % (ref 20–42)
MCH RBC QN AUTO: 32.6 PG (ref 26–35)
MCHC RBC AUTO-ENTMCNC: 30.3 % (ref 32–34.5)
MCV RBC AUTO: 107.8 FL (ref 80–99.9)
MONOCYTES ABSOLUTE: 0.58 E9/L (ref 0.1–0.95)
MONOCYTES RELATIVE PERCENT: 9.1 % (ref 2–12)
NEUTROPHILS ABSOLUTE: 4.75 E9/L (ref 1.8–7.3)
NEUTROPHILS RELATIVE PERCENT: 74.5 % (ref 43–80)
PDW BLD-RTO: 16 FL (ref 11.5–15)
PLATELET # BLD: 207 E9/L (ref 130–450)
PMV BLD AUTO: 11 FL (ref 7–12)
POTASSIUM SERPL-SCNC: 4.7 MMOL/L (ref 3.5–5)
PROTHROMBIN TIME, POC: 48.3
RBC # BLD: 4.23 E12/L (ref 3.5–5.5)
SODIUM BLD-SCNC: 140 MMOL/L (ref 132–146)
TOTAL PROTEIN: 7.3 G/DL (ref 6.4–8.3)
TRIGL SERPL-MCNC: 99 MG/DL (ref 0–149)
VLDLC SERPL CALC-MCNC: 20 MG/DL
WBC # BLD: 6.4 E9/L (ref 4.5–11.5)

## 2020-05-20 PROCEDURE — 82746 ASSAY OF FOLIC ACID SERUM: CPT

## 2020-05-20 PROCEDURE — 36415 COLL VENOUS BLD VENIPUNCTURE: CPT

## 2020-05-20 PROCEDURE — 80053 COMPREHEN METABOLIC PANEL: CPT

## 2020-05-20 PROCEDURE — 99214 OFFICE O/P EST MOD 30 MIN: CPT | Performed by: INTERNAL MEDICINE

## 2020-05-20 PROCEDURE — 1036F TOBACCO NON-USER: CPT | Performed by: INTERNAL MEDICINE

## 2020-05-20 PROCEDURE — G8427 DOCREV CUR MEDS BY ELIG CLIN: HCPCS | Performed by: INTERNAL MEDICINE

## 2020-05-20 PROCEDURE — 1123F ACP DISCUSS/DSCN MKR DOCD: CPT | Performed by: INTERNAL MEDICINE

## 2020-05-20 PROCEDURE — 80061 LIPID PANEL: CPT

## 2020-05-20 PROCEDURE — 1090F PRES/ABSN URINE INCON ASSESS: CPT | Performed by: INTERNAL MEDICINE

## 2020-05-20 PROCEDURE — 4040F PNEUMOC VAC/ADMIN/RCVD: CPT | Performed by: INTERNAL MEDICINE

## 2020-05-20 PROCEDURE — G8417 CALC BMI ABV UP PARAM F/U: HCPCS | Performed by: INTERNAL MEDICINE

## 2020-05-20 PROCEDURE — 85025 COMPLETE CBC W/AUTO DIFF WBC: CPT

## 2020-05-20 PROCEDURE — 85610 PROTHROMBIN TIME: CPT | Performed by: INTERNAL MEDICINE

## 2020-05-20 PROCEDURE — 82607 VITAMIN B-12: CPT

## 2020-05-20 RX ORDER — OXYCODONE AND ACETAMINOPHEN 10; 325 MG/1; MG/1
1 TABLET ORAL 3 TIMES DAILY PRN
Qty: 90 TABLET | Refills: 0 | Status: SHIPPED | OUTPATIENT
Start: 2020-05-20 | End: 2020-06-25 | Stop reason: SDUPTHER

## 2020-05-20 RX ORDER — NYSTATIN 100000 [USP'U]/G
POWDER TOPICAL
Qty: 60 G | Refills: 1 | Status: SHIPPED
Start: 2020-05-20 | End: 2021-07-28 | Stop reason: ALTCHOICE

## 2020-05-20 NOTE — PROGRESS NOTES
palpation. No gross deformities noted. Pulses are  palpable. Psych: Patient's attitude is cooperative. Patient's affect is appropriate. Judgement is realistic. Insight is appropriate. Neurologically grossly intact without focal deficits noted         Assessment and Plan:  Ector Bob was seen today for office visit for anticoagulation management. Diagnoses and all orders for this visit:    Essential hypertension  -     CBC Auto Differential; Future  -     Comprehensive Metabolic Panel; Future  -     Lipid Panel; Future    Chronic pain syndrome  -     oxyCODONE-acetaminophen (PERCOCET)  MG per tablet; Take 1 tablet by mouth 3 times daily as needed for Pain for up to 30 days. Compression fracture of body of thoracic vertebra (HCC)  -     oxyCODONE-acetaminophen (PERCOCET)  MG per tablet; Take 1 tablet by mouth 3 times daily as needed for Pain for up to 30 days. Chronic, continuous use of opioids  -     oxyCODONE-acetaminophen (PERCOCET)  MG per tablet; Take 1 tablet by mouth 3 times daily as needed for Pain for up to 30 days. Arterial embolism (HCC)  -     POCT INR    Edema, unspecified type    Anticoagulant long-term use  -     VITAMIN B12 & FOLATE; Future    Congestive heart failure, unspecified HF chronicity, unspecified heart failure type ([address]    Other orders  -     nystatin (MYCOSTATIN) 000195 UNIT/GM powder; Apply 3 times daily. Plan: She is to continue Lasix with changes addressed above. Watch for any bleeding. Blood work today to monitor disease progression and medication use. She is going back on her Lasix. Also refilled nystatin powder for her that was initially prescribed by GYN for intertrigo of the breast area. This has helped her in the past.  I did ask the daughter to make an appointment with cardiology as she is overdue and I feel she should be seen at least yearly prescription management performed. See above body report. .      Patient's oxygen recertification

## 2020-05-21 ENCOUNTER — TELEPHONE (OUTPATIENT)
Dept: FAMILY MEDICINE CLINIC | Age: 85
End: 2020-05-21

## 2020-05-21 LAB
FOLATE: 10.8 NG/ML (ref 4.8–24.2)
VITAMIN B-12: >2000 PG/ML (ref 211–946)

## 2020-05-21 NOTE — TELEPHONE ENCOUNTER
Pts daughter Justus Britton notified. She stated they did go see Dr Elvira Ramachandran last week. Will get the office note from them.

## 2020-05-22 ENCOUNTER — TELEPHONE (OUTPATIENT)
Dept: FAMILY MEDICINE CLINIC | Age: 85
End: 2020-05-22

## 2020-05-22 NOTE — TELEPHONE ENCOUNTER
If she can live with that stop it. I thought she had a bedside commode or someplace close she could get to.   I cannot force her to take it and I do not have anything else to add right now

## 2020-06-22 RX ORDER — OXYCODONE AND ACETAMINOPHEN 10; 325 MG/1; MG/1
1 TABLET ORAL 3 TIMES DAILY PRN
Qty: 90 TABLET | Refills: 0 | Status: CANCELLED | OUTPATIENT
Start: 2020-06-22 | End: 2020-07-22

## 2020-06-25 ENCOUNTER — OFFICE VISIT (OUTPATIENT)
Dept: FAMILY MEDICINE CLINIC | Age: 85
End: 2020-06-25
Payer: MEDICARE

## 2020-06-25 VITALS
BODY MASS INDEX: 28.16 KG/M2 | DIASTOLIC BLOOD PRESSURE: 70 MMHG | HEART RATE: 58 BPM | OXYGEN SATURATION: 93 % | TEMPERATURE: 97.1 F | HEIGHT: 62 IN | WEIGHT: 153 LBS | SYSTOLIC BLOOD PRESSURE: 118 MMHG

## 2020-06-25 LAB
INTERNATIONAL NORMALIZATION RATIO, POC: 3.8
PROTHROMBIN TIME, POC: 46

## 2020-06-25 PROCEDURE — 85610 PROTHROMBIN TIME: CPT | Performed by: INTERNAL MEDICINE

## 2020-06-25 PROCEDURE — 1036F TOBACCO NON-USER: CPT | Performed by: INTERNAL MEDICINE

## 2020-06-25 PROCEDURE — G8417 CALC BMI ABV UP PARAM F/U: HCPCS | Performed by: INTERNAL MEDICINE

## 2020-06-25 PROCEDURE — 99213 OFFICE O/P EST LOW 20 MIN: CPT | Performed by: INTERNAL MEDICINE

## 2020-06-25 PROCEDURE — 4040F PNEUMOC VAC/ADMIN/RCVD: CPT | Performed by: INTERNAL MEDICINE

## 2020-06-25 PROCEDURE — G8427 DOCREV CUR MEDS BY ELIG CLIN: HCPCS | Performed by: INTERNAL MEDICINE

## 2020-06-25 PROCEDURE — 1123F ACP DISCUSS/DSCN MKR DOCD: CPT | Performed by: INTERNAL MEDICINE

## 2020-06-25 PROCEDURE — 1090F PRES/ABSN URINE INCON ASSESS: CPT | Performed by: INTERNAL MEDICINE

## 2020-06-25 RX ORDER — OXYCODONE AND ACETAMINOPHEN 10; 325 MG/1; MG/1
1 TABLET ORAL 3 TIMES DAILY PRN
Qty: 90 TABLET | Refills: 0 | Status: SHIPPED | OUTPATIENT
Start: 2020-06-25 | End: 2020-07-24 | Stop reason: SDUPTHER

## 2020-06-25 NOTE — PROGRESS NOTES
pain/chronic-continues on narcotic pain medication with success. T11-L1 vertebroplasty. Successful.   Skin: Negative.    Neurological: Negative for dizziness, weakness, light-headedness, numbness and headaches. Hematological: Negative.    Psychiatric/Behavioral: Admits to being more emotional and angry since taken off of her Prozac.-Placed back on again with improvement    REST OF PERTINENT ROS GONE OVER AND WAS NEGATIVE. PMH:  Problem List: Chronic pain syndrome, Long-term current use of anticoagulant, Anticoagulant, Osteoarthritis, Taking  medication, Hyperlipidemia  Health Maintenance:  Mammogram - (1/23/2019)  Mammogram Screening - (1/23/2019)  Influenza Vaccination - (11/21/2018)  Bone Density Scan - (1/11/2011)  Colonoscopy - (11/12/2015)  Couseled on Home Safety - (4/12/2017)  Bone Density Test Screening - (5/23/2014)  Stress Test - 1/08-ok,3/11  EKG - 7/09,11,3/13  Rectal Exam - 8/10  Breast Exam - 8/10,declined,2/14  Hemmocult Cards - 2/13-neg x 3  2D ECHO - 8/15  Mini Mental Status - 4/19--29/30  Medical Problems:  Osteoarthritis  DVT - right-1/04  Hypertension  Lumbar Spondylosis-Herniated Disc-Spinal Stenosis - epidurals/[place]  Hyperlipidemia, YAIR-BSO--Non Cancerous  Small AAA - 3.4cm--- 2/19.--3.5 cm per CT urogram  HX FX Left Ankle, Chronic Greater Trochanteric Bursitis, Depression, Cataracts  Coronary Artery Disease (CAD) - Previously declined cardiology fu  neurogenic claudication  Spinal Stenosis - L2-5 lumbar laminectomy-dr massey,[facility]  Kidney Stones, Anxiety, Diverticulitis, Osteoporosis, post laminectomy syndrome lumbar spine  Goiter - multinodular  hematuria - cystoscopy/retrograde-worked up by urology. urethral stenosis - dialated  chronic urethral inflammation, strep bovis bacteremia, Congestive Heart Failure (CHF), arterial thrombosis right arm  T12 compression fracture.  - Vertebroplasty  Lumbar Spondylosis-Herniated Disc-Spinal Stenosis  T11 and L1 kyphoplasty - 1/18  Dilated disease)     CHF (congestive heart failure) (HCC)     Chronic pain syndrome 5/29/2019    HTN (hypertension)     Hx of blood clots 2015    right upper arm    Hyperlipemia      Past Surgical History:   Procedure Laterality Date    [facility]  [facility], pt unsure    COLONOSCOPY      EYE SURGERY Bilateral     cataracts    FIXATION KYPHOPLASTY  10/02/2017    T12 kyphoplasty Dr Agustin Maynard KYPHOPLASTY  11/20/2017    T11-L1 kypho    HYSTERECTOMY      HYSTERECTOMY, TOTAL ABDOMINAL      TONSILLECTOMY      TRANSESOPHAGEAL ECHOCARDIOGRAM  11/04/15     Family History   Problem Relation Age of Onset    Breast Cancer Mother [de-identified]    Heart Disease Father     Cancer Sister         melanoma     Social History     Socioeconomic History    Marital status:      Spouse name: Not on file    Number of children: Not on file    Years of education: Not on file    Highest education level: Not on file   Occupational History    Not on file   Social Needs    Financial resource strain: Patient refused    Food insecurity     Worry: Patient refused     Inability: Patient refused    Transportation needs     Medical: Patient refused     Non-medical: Patient refused   Tobacco Use    Smoking status: Former Smoker    Smokeless tobacco: Never Used    Tobacco comment: quit about 30 yrs ago   Substance and Sexual Activity    Alcohol use: No    Drug use: No    Sexual activity: Not on file   Lifestyle    Physical activity     Days per week: Not on file     Minutes per session: Not on file    Stress: Not on file   Relationships    Social connections     Talks on phone: Not on file     Gets together: Not on file     Attends [employer] service: Not on file     Active member of club or organization: Not on file     Attends meetings of clubs or organizations: Not on file     Relationship status: Not on file    Intimate partner violence     Fear of current or ex partner: Not on file     Emotionally abused: Not on file     Physically abused: Not on file     Forced sexual activity: Not on file   Other Topics Concern    Not on file   Social History Narrative    Not on file       Vitals:    06/25/20 1636   BP: 118/70   Pulse: 58   Temp: 97.1 °F (36.2 °C)   TempSrc: Temporal   SpO2: 93%   Weight: 153 lb (69.4 kg)   Height: 5' 2\" (1.575 m)       Physical Exam    No physical exam performed today. Assessment and Plan:  Goyo Dutton was seen today for hypertension. Diagnoses and all orders for this visit:    Chronic pain syndrome  -     oxyCODONE-acetaminophen (PERCOCET)  MG per tablet; Take 1 tablet by mouth 3 times daily as needed for Pain for up to 30 days. Compression fracture of body of thoracic vertebra (HCC)  -     oxyCODONE-acetaminophen (PERCOCET)  MG per tablet; Take 1 tablet by mouth 3 times daily as needed for Pain for up to 30 days. Chronic, continuous use of opioids  -     oxyCODONE-acetaminophen (PERCOCET)  MG per tablet; Take 1 tablet by mouth 3 times daily as needed for Pain for up to 30 days. Arterial embolism ([address]  -     POCT INR    Plan: I adjusted her Coumadin having her take none today then 2.5 mg Monday through Saturday and 5 mg Sunday. Repeat INR in 1 month as well as regular follow-up visit at that time. Follow-up with above consultants. Look over hematology/oncology note when I see her next. I did hold a few of her supplements because she was very upset about everything she was taking and was getting confused. I will reassess them at next visit including her B12 and folate. Blood work at next visit to include B12 and folate levels. INR check also at next visit. Prescription management performed. Fall precautions. Notify us with problems in the interim. No follow-ups on file. Seen By:  Darin Dunaway MD      *Document was created using voice recognition software. Note was reviewed however may contain grammatical errors.

## 2020-07-03 ENCOUNTER — HOSPITAL ENCOUNTER (EMERGENCY)
Dept: HOSPITAL 83 - ED | Age: 85
Discharge: HOME | End: 2020-07-03
Payer: MEDICARE

## 2020-07-03 VITALS — DIASTOLIC BLOOD PRESSURE: 88 MMHG

## 2020-07-03 VITALS — WEIGHT: 170 LBS | BODY MASS INDEX: 28.32 KG/M2 | HEIGHT: 65 IN

## 2020-07-03 DIAGNOSIS — R60.0: Primary | ICD-10-CM

## 2020-07-03 DIAGNOSIS — M19.90: ICD-10-CM

## 2020-07-03 DIAGNOSIS — I10: ICD-10-CM

## 2020-07-03 DIAGNOSIS — F41.9: ICD-10-CM

## 2020-07-03 DIAGNOSIS — Z79.2: ICD-10-CM

## 2020-07-03 DIAGNOSIS — Z88.8: ICD-10-CM

## 2020-07-03 DIAGNOSIS — E78.00: ICD-10-CM

## 2020-07-03 DIAGNOSIS — Z79.01: ICD-10-CM

## 2020-07-03 DIAGNOSIS — F32.9: ICD-10-CM

## 2020-07-03 LAB
ALBUMIN SERPL-MCNC: 3 GM/DL (ref 3.1–4.5)
ALP SERPL-CCNC: 100 U/L (ref 45–117)
ALT SERPL W P-5'-P-CCNC: 14 U/L (ref 12–78)
AST SERPL-CCNC: 24 IU/L (ref 3–35)
BASOPHILS # BLD AUTO: 0 10*3/UL (ref 0–0.1)
BASOPHILS NFR BLD AUTO: 0.6 % (ref 0–1)
BUN SERPL-MCNC: 15 MG/DL (ref 7–24)
CHLORIDE SERPL-SCNC: 112 MMOL/L (ref 98–107)
CREAT SERPL-MCNC: 0.92 MG/DL (ref 0.55–1.02)
EOSINOPHIL # BLD AUTO: 0.1 10*3/UL (ref 0–0.4)
EOSINOPHIL # BLD AUTO: 0.8 % (ref 1–4)
ERYTHROCYTE [DISTWIDTH] IN BLOOD BY AUTOMATED COUNT: 17.2 % (ref 0–14.5)
HCT VFR BLD AUTO: 45.1 % (ref 37–47)
INR BLD: 4 (ref 2–3.5)
LYMPHOCYTES # BLD AUTO: 1.5 10*3/UL (ref 1.3–4.4)
LYMPHOCYTES NFR BLD AUTO: 22.7 % (ref 27–41)
MCH RBC QN AUTO: 31.9 PG (ref 27–31)
MCHC RBC AUTO-ENTMCNC: 30.4 G/DL (ref 33–37)
MCV RBC AUTO: 105.1 FL (ref 81–99)
MONOCYTES # BLD AUTO: 0.6 10*3/UL (ref 0.1–1)
MONOCYTES NFR BLD MANUAL: 9.8 % (ref 3–9)
NEUT #: 4.2 10*3/UL (ref 2.3–7.9)
NEUT %: 65.8 % (ref 47–73)
NRBC BLD QL AUTO: 0 10*3/UL (ref 0–0)
PLATELET # BLD AUTO: 210 10*3/UL (ref 130–400)
PMV BLD AUTO: 10.9 FL (ref 9.6–12.3)
POTASSIUM SERPL-SCNC: 4.3 MMOL/L (ref 3.5–5.1)
PROT SERPL-MCNC: 7.4 GM/DL (ref 6.4–8.2)
RBC # BLD AUTO: 4.29 10*6/UL (ref 4.1–5.1)
SODIUM SERPL-SCNC: 141 MMOL/L (ref 136–145)
WBC NRBC COR # BLD AUTO: 6.4 10*3/UL (ref 4.8–10.8)

## 2020-07-06 ENCOUNTER — TELEPHONE (OUTPATIENT)
Dept: FAMILY MEDICINE CLINIC | Age: 85
End: 2020-07-06

## 2020-07-06 NOTE — TELEPHONE ENCOUNTER
Daughter - Dimitrios Robertss  [phone]     She is calling about her mom's legs. Since she stopped the Lasix, her legs are weeping quite a bit. Please advise.

## 2020-07-24 ENCOUNTER — OFFICE VISIT (OUTPATIENT)
Dept: FAMILY MEDICINE CLINIC | Age: 85
End: 2020-07-24
Payer: MEDICARE

## 2020-07-24 VITALS
OXYGEN SATURATION: 92 % | SYSTOLIC BLOOD PRESSURE: 132 MMHG | HEIGHT: 63 IN | BODY MASS INDEX: 28.74 KG/M2 | DIASTOLIC BLOOD PRESSURE: 78 MMHG | HEART RATE: 74 BPM | TEMPERATURE: 97.8 F | WEIGHT: 162.2 LBS

## 2020-07-24 PROBLEM — I48.91 ATRIAL FIBRILLATION (HCC): Status: RESOLVED | Noted: 2019-06-27 | Resolved: 2020-07-24

## 2020-07-24 LAB
INTERNATIONAL NORMALIZATION RATIO, POC: 4.5
PROTHROMBIN TIME, POC: 54.1

## 2020-07-24 PROCEDURE — 85610 PROTHROMBIN TIME: CPT | Performed by: INTERNAL MEDICINE

## 2020-07-24 PROCEDURE — 1036F TOBACCO NON-USER: CPT | Performed by: INTERNAL MEDICINE

## 2020-07-24 PROCEDURE — 1123F ACP DISCUSS/DSCN MKR DOCD: CPT | Performed by: INTERNAL MEDICINE

## 2020-07-24 PROCEDURE — 3023F SPIROM DOC REV: CPT | Performed by: INTERNAL MEDICINE

## 2020-07-24 PROCEDURE — G8427 DOCREV CUR MEDS BY ELIG CLIN: HCPCS | Performed by: INTERNAL MEDICINE

## 2020-07-24 PROCEDURE — G8417 CALC BMI ABV UP PARAM F/U: HCPCS | Performed by: INTERNAL MEDICINE

## 2020-07-24 PROCEDURE — 1090F PRES/ABSN URINE INCON ASSESS: CPT | Performed by: INTERNAL MEDICINE

## 2020-07-24 PROCEDURE — 4040F PNEUMOC VAC/ADMIN/RCVD: CPT | Performed by: INTERNAL MEDICINE

## 2020-07-24 PROCEDURE — G8926 SPIRO NO PERF OR DOC: HCPCS | Performed by: INTERNAL MEDICINE

## 2020-07-24 PROCEDURE — 99214 OFFICE O/P EST MOD 30 MIN: CPT | Performed by: INTERNAL MEDICINE

## 2020-07-24 RX ORDER — FLUOXETINE 10 MG/1
10 CAPSULE ORAL DAILY
COMMUNITY
End: 2020-09-09 | Stop reason: SDUPTHER

## 2020-07-24 RX ORDER — OXYCODONE AND ACETAMINOPHEN 10; 325 MG/1; MG/1
1 TABLET ORAL 3 TIMES DAILY PRN
Qty: 90 TABLET | Refills: 0 | Status: SHIPPED | OUTPATIENT
Start: 2020-07-24 | End: 2020-08-05 | Stop reason: SDUPTHER

## 2020-07-24 RX ORDER — DOCUSATE SODIUM 100 MG/1
100 CAPSULE, LIQUID FILLED ORAL 2 TIMES DAILY
COMMUNITY

## 2020-07-26 NOTE — PROGRESS NOTES
[address] [facility] [address] [facility]     20  Yadi Hutchinson : 3/1/1932 Sex: female  Age: 80 y.o. Chief Complaint   Patient presents with    Office Visit for Anticoagulation Management    Other     3-month follow-up       HPI  Patient presents today accompanied by daughter for 3-month follow-up visit along with narcotic request and INR check/anticoagulation management. She is requesting her Vicodin for chronic pain control which she states helps her significantly and cannot function without at this point. Oars report was run and okay. No evidence of abuse or diversion of her medication. Her INR today was 4.5. She is having no bleeding or thrombotic episodes. She did stop her Lasix in the interim and swelled up significantly with seepage from her lower extremities. She did go back on the medication and is now much better. Stressed compliance with this. Blood pressure has been  good at home. She is supposed to be following with urology, podiatry, pulmonary, renal and recommendation to have her see cardiology for which she is overdue. She did also see hematology/oncology recently and I reviewed their note. She was seen for macrocytosis.         Review of Systems     Constitutional:  negative for activity change, chills, diaphoresis,  and fever. HENT: Negative for congestion, ear pain, hearing loss, postnasal drip, rhinorrhea and sinus pain.    Respiratory: Positive for shortness of breath.-Stable negative for cough and wheezing.         COPD chronic oxygen use at night and as needed  Cardiovascular: Negative for chest pain, palpitations.  Positive for leg swelling.  -Improved from last couple visits. Gastrointestinal: Positive for constipation.   Negative for abdominal pain, blood in stool, diarrhea,  and vomiting.         Endocrine: Negative.    Genitourinary: Negative for difficulty urinating, dysuria, hematuria and urgency.         Reports frequent UTIs .does have some incontinence of urine following with urology.  Gross hematuria has been worked up.  She no longer has urinary catheter.    Musculoskeletal: Positive for arthralgias.  back pain, and gait problem       Chronic pain syndrome/back pain--lower back pain and bilateral hip pain/chronic-continues on narcotic pain medication with success. T11-L1 vertebroplasty. Successful.   Skin: Negative.    Neurological: Negative for dizziness, weakness, light-headedness, numbness and headaches. Hematological: Negative.    Psychiatric/Behavioral: Admits to being more emotional and angry since taken off of her Prozac.-Placed back on again with improvement      REST OF PERTINENT ROS GONE OVER AND WAS NEGATIVE. PMH:  Problem List: Chronic pain syndrome, Long-term current use of anticoagulant, Anticoagulant, Osteoarthritis, Taking  medication, Hyperlipidemia  Health Maintenance:  Mammogram - (1/23/2019)  Mammogram Screening - (1/23/2019)  Influenza Vaccination - (11/21/2018)  Bone Density Scan - (1/11/2011)  Colonoscopy - (11/12/2015)  Couseled on Home Safety - (4/12/2017)  Bone Density Test Screening - (5/23/2014)  Stress Test - 1/08-ok,3/11  EKG - 7/09,11,3/13  Rectal Exam - 8/10  Breast Exam - 8/10,declined,2/14  Hemmocult Cards - 2/13-neg x 3  2D ECHO - 8/15  Mini Mental Status - 4/19--29/30  Medical Problems:  Osteoarthritis  DVT - right-1/04  Hypertension  Lumbar Spondylosis-Herniated Disc-Spinal Stenosis - epidurals/[place]  Hyperlipidemia, YAIR-BSO--Non Cancerous  Small AAA - 3.4cm--- 2/19.--3.5 cm per CT urogram  HX FX Left Ankle, Chronic Greater Trochanteric Bursitis, Depression, Cataracts  Coronary Artery Disease (CAD) - Previously declined cardiology fu  neurogenic claudication  Spinal Stenosis - L2-5 lumbar laminectomy-dr massey,[facility]  Kidney Stones, Anxiety, Diverticulitis, Osteoporosis, post laminectomy syndrome lumbar spine  Goiter - multinodular  hematuria - cystoscopy/retrograde-worked up by urology.   urethral stenosis - dialated  chronic urethral inflammation, strep bovis bacteremia, Congestive Heart Failure (CHF), arterial thrombosis right arm  T12 compression fracture. - Vertebroplasty  Lumbar Spondylosis-Herniated Disc-Spinal Stenosis  T11 and L1 kyphoplasty -   Dilated nonischemic cardiomyopathy - Follows with cardiology  copd  Reviewed and updated. FH:  Father:  MI -  late 63's. Mother:  Breast Cancer - ? ?  AGE 80. Son 1:  Chronic Obstructive Pulmonary Disease (COPD) -  age 61. Sister 1:  melanoma -  age in her 52's. Reviewed, no changes. SH:  Marital: . Personal Habits: Cigarette Use: Does Not Smoke. Alcohol: does not use alcohol. Reviewed, no changes.             Current Outpatient Medications:     oxyCODONE-acetaminophen (PERCOCET)  MG per tablet, Take 1 tablet by mouth 3 times daily as needed for Pain for up to 30 days. , Disp: 90 tablet, Rfl: 0    Lactobacillus (PROBIOTIC ACIDOPHILUS PO), Take by mouth, Disp: , Rfl:     docusate sodium (COLACE) 100 MG capsule, Take 100 mg by mouth daily, Disp: , Rfl:     FLUoxetine (PROZAC) 10 MG capsule, Take 10 mg by mouth daily, Disp: , Rfl:     nystatin (MYCOSTATIN) 670764 UNIT/GM powder, Apply 3 times daily. , Disp: 60 g, Rfl: 1    atorvastatin (LIPITOR) 40 MG tablet, Take 1 tablet by mouth daily, Disp: 30 tablet, Rfl: 5    isosorbide mononitrate (IMDUR) 30 MG extended release tablet, Take 1 tablet by mouth daily Take morning of surgery with a sip of water, Disp: 30 tablet, Rfl: 5    warfarin (COUMADIN) 5 MG tablet, Take 1 tablet by mouth daily Take 1 po qd or as instructed by PCP, Disp: 30 tablet, Rfl: 5    lisinopril (PRINIVIL;ZESTRIL) 10 MG tablet, Take 1 tablet by mouth daily, Disp: 30 tablet, Rfl: 5    furosemide (LASIX) 20 MG tablet, Take 1 tablet by mouth daily, Disp: 30 tablet, Rfl: 5    potassium chloride (KLOR-CON) 20 MEQ packet, Take 20 mEq by mouth daily, Disp: 30 each, Rfl: 5  No Known Allergies    Past Medical History: Diagnosis Date    AAA (abdominal aortic aneurysm) ([facility] [address]     Anticoagulant long-term use 5/29/2019    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)     CAD (coronary artery disease)     CHF (congestive heart failure) (HCC)     Chronic pain syndrome 5/29/2019    HTN (hypertension)     Hx of blood clots 2015    right upper arm    Hyperlipemia      Past Surgical History:   Procedure Laterality Date    BACK SURGERY  2010    [facility] for sciatica, pt unsure    COLONOSCOPY      EYE SURGERY Bilateral     cataracts    FIXATION KYPHOPLASTY  10/02/2017    T12 kyphoplasty Dr Gabriel Valdes KYPHOPLASTY  11/20/2017    T11-L1 kypho    HYSTERECTOMY      HYSTERECTOMY, TOTAL ABDOMINAL      TONSILLECTOMY      TRANSESOPHAGEAL ECHOCARDIOGRAM  11/04/15     Family History   Problem Relation Age of Onset    Breast Cancer Mother [de-identified]    Heart Disease Father     Cancer Sister         melanoma     Social History     Socioeconomic History    Marital status:      Spouse name: Not on file    Number of children: Not on file    Years of education: Not on file    Highest education level: Not on file   Occupational History    Not on file   Social Needs    Financial resource strain: Patient refused    Food insecurity     Worry: Patient refused     Inability: Patient refused    Transportation needs     Medical: Patient refused     Non-medical: Patient refused   Tobacco Use    Smoking status: Former Smoker    Smokeless tobacco: Never Used    Tobacco comment: quit about 30 yrs ago   Substance and Sexual Activity    Alcohol use: No    Drug use: No    Sexual activity: Not on file   Lifestyle    Physical activity     Days per week: Not on file     Minutes per session: Not on file    Stress: Not on file   Relationships    Social connections     Talks on phone: Not on file     Gets together: Not on file     Attends Jehovah's witness service: Not on file     Active member of club or organization: Not on file Attends meetings of clubs or organizations: Not on file     Relationship status: Not on file    Intimate partner violence     Fear of current or ex partner: Not on file     Emotionally abused: Not on file     Physically abused: Not on file     Forced sexual activity: Not on file   Other Topics Concern    Not on file   Social History Narrative    Not on file       Vitals:    07/24/20 1401   BP: 132/78   Pulse: 74   Temp: 97.8 °F (36.6 °C)   TempSrc: Temporal   SpO2: 92%   Weight: 162 lb 3.2 oz (73.6 kg)   Height: 5' 3\" (1.6 m)       Physical Exam    Const: Appears well developed and well nourished. No signs of acute distress present. Neck: Supple and symmetric. Palpation reveals no adenopathy. No masses appreciated. Thyroid exhibits no nodules  or thyromegaly. No JVD. Carotids: 2+ and equal bilaterally, without bruits. Resp: Rhonchi, rales or wheezes appreciated over the lungs bilaterally.  Prolonged expiratory phase  CV: Rate is regular. Rhythm is regular. S1 is normal. S2 is normal. No gallop or rubs. No heart murmur  appreciated. Extremities: 2+ pitting edema left leg and 1+ pitting edema right leg, but no clubbing or cyanosis-left greater than right. Left side is typically larger than right  Abdomen: Bowel sounds are normoactive. Palpation of the abdomen reveals softness, but no distension,  organomegaly or tenderness. No abdominal masses. No palpable hepatosplenomegaly. Musculo: Patient arrived at office in wheelchair  Upper  Extremities: Full ROM bilaterally. Lower Extremities: Limited range of motion.  swelling noted. /.  With improvement.  She does have some reproducible  tenderness along the lower thoracic upper lumbar region to palpation. No gross deformities noted. Pulses are  palpable. Psych: Patient's attitude is cooperative. Patient's affect is appropriate. Judgement is realistic. Insight is appropriate.   Neurologically grossly intact without focal deficits noted      Assessment and Plan:  Mehran Kirkpatrick was seen today for office visit for anticoagulation management and other. Diagnoses and all orders for this visit:    Anticoagulant long-term use    Chronic pain syndrome  -     oxyCODONE-acetaminophen (PERCOCET)  MG per tablet; Take 1 tablet by mouth 3 times daily as needed for Pain for up to 30 days. Chronic, continuous use of opioids  -     oxyCODONE-acetaminophen (PERCOCET)  MG per tablet; Take 1 tablet by mouth 3 times daily as needed for Pain for up to 30 days. Stable on her narcotic medicine    Compression fracture of body of thoracic vertebra (HCC)  -     oxyCODONE-acetaminophen (PERCOCET)  MG per tablet; Take 1 tablet by mouth 3 times daily as needed for Pain for up to 30 days. Stable postoperative kyphoplasty    Arterial embolism (HCC)  -     POCT INR  Continue anticoagulation    Essential hypertension  Stable on medication    Chronic obstructive pulmonary disease, unspecified COPD type (HCC)  Stable    Macrocytosis  Stable-has seen hematology/oncology    Fatigue, unspecified type  Multifactorial    Plan: Prescription management performed. Reviewed latest blood work from 2 months ago. Will not repeat anything today. Made changes in her Coumadin to hold today and tomorrow then 2.5 mg daily. Watch for any bleeding. Continue with her Lasix. 2-week INR follow-up in 3-month regular follow-up visit. Look over consultant status next visit. Fall precautions. Notify us of problems in the interim. Return in about 2 weeks (around 8/7/2020), or 2 week inr, 3 month reg fu. Seen By:  Belinda Bernal MD      *Document was created using voice recognition software. Note was reviewed however may contain grammatical errors.

## 2020-08-05 ENCOUNTER — OFFICE VISIT (OUTPATIENT)
Dept: FAMILY MEDICINE CLINIC | Age: 85
End: 2020-08-05
Payer: MEDICARE

## 2020-08-05 VITALS
DIASTOLIC BLOOD PRESSURE: 72 MMHG | BODY MASS INDEX: 29.8 KG/M2 | OXYGEN SATURATION: 92 % | HEIGHT: 63 IN | HEART RATE: 53 BPM | TEMPERATURE: 97.3 F | SYSTOLIC BLOOD PRESSURE: 124 MMHG | WEIGHT: 168.2 LBS

## 2020-08-05 LAB
INTERNATIONAL NORMALIZATION RATIO, POC: 3.6
PROTHROMBIN TIME, POC: 43.7

## 2020-08-05 PROCEDURE — 99213 OFFICE O/P EST LOW 20 MIN: CPT | Performed by: INTERNAL MEDICINE

## 2020-08-05 PROCEDURE — G8427 DOCREV CUR MEDS BY ELIG CLIN: HCPCS | Performed by: INTERNAL MEDICINE

## 2020-08-05 PROCEDURE — 1123F ACP DISCUSS/DSCN MKR DOCD: CPT | Performed by: INTERNAL MEDICINE

## 2020-08-05 PROCEDURE — 1090F PRES/ABSN URINE INCON ASSESS: CPT | Performed by: INTERNAL MEDICINE

## 2020-08-05 PROCEDURE — G8417 CALC BMI ABV UP PARAM F/U: HCPCS | Performed by: INTERNAL MEDICINE

## 2020-08-05 PROCEDURE — 1036F TOBACCO NON-USER: CPT | Performed by: INTERNAL MEDICINE

## 2020-08-05 PROCEDURE — 4040F PNEUMOC VAC/ADMIN/RCVD: CPT | Performed by: INTERNAL MEDICINE

## 2020-08-05 PROCEDURE — 85610 PROTHROMBIN TIME: CPT | Performed by: INTERNAL MEDICINE

## 2020-08-05 RX ORDER — WARFARIN SODIUM 1 MG/1
1 TABLET ORAL DAILY
Qty: 30 TABLET | Refills: 3 | Status: SHIPPED
Start: 2020-08-05 | End: 2020-09-30 | Stop reason: SDUPTHER

## 2020-08-05 RX ORDER — OXYCODONE AND ACETAMINOPHEN 10; 325 MG/1; MG/1
1 TABLET ORAL 3 TIMES DAILY PRN
Qty: 90 TABLET | Refills: 0 | Status: SHIPPED | OUTPATIENT
Start: 2020-08-05 | End: 2020-08-05 | Stop reason: CLARIF

## 2020-08-05 NOTE — PROGRESS NOTES
[facility]     20  Skip Lites : 3/1/1932 Sex: female  Age: 80 y.o. Chief Complaint   Patient presents with    Hypertension    Chronic Pain       HPI  Patient presents today accompanied by daughter for 2-week INR check. Last INR was 4.5 adjustments were made today's INR was 3.6. No bleeding or thrombotic episodes. I told her to hold today's dose and then go to 2.5 mg Monday through Friday and 1 mg Saturday and . We will check another INR in 2 weeks. She will be due for narcotic prescription at that time as well. Last visit was in 3 months regular visit. She is supposed to be following with urology, podiatry, pulmonary, renal and recommendation to have her see cardiology for which she is overdue. She did also see hematology/oncology recently and I reviewed their note. She was seen for macrocytosis      Review of Systems   Constitutional:  negative for activity change, chills, diaphoresis,  and fever. HENT: Negative for congestion, ear pain, hearing loss, postnasal drip, rhinorrhea and sinus pain.    Respiratory: Positive for shortness of breath.-Stable negative for cough and wheezing.         COPD chronic oxygen use at night and as needed  Cardiovascular: Negative for chest pain, palpitations.  Positive for leg swelling.  -Improved from last couple visits. Gastrointestinal: Positive for constipation.   Negative for abdominal pain, blood in stool, diarrhea,  and vomiting.         Endocrine: Negative.    Genitourinary: Negative for difficulty urinating, dysuria, hematuria and urgency.         Reports frequent UTIs .does have some incontinence of urine following with urology. Laura Davidson hematuria has been worked up.  She no longer has urinary catheter.    Musculoskeletal: Positive for arthralgias.  back pain, and gait problem       Chronic pain syndrome/back pain--lower back pain and bilateral hip pain/chronic-continues on narcotic pain medication with success.  T11-L1 vertebroplasty. Successful.   Skin: Negative.    Neurological: Negative for dizziness, weakness, light-headedness, numbness and headaches. Hematological: Negative.    Psychiatric/Behavioral: Admits to being more emotional and angry since taken off of her Prozac.-Placed back on again with improvement             REST OF PERTINENT ROS GONE OVER AND WAS NEGATIVE. Current Outpatient Medications:     warfarin (COUMADIN) 1 MG tablet, Take 1 tablet by mouth daily, Disp: 30 tablet, Rfl: 3    Lactobacillus (PROBIOTIC ACIDOPHILUS PO), Take by mouth, Disp: , Rfl:     docusate sodium (COLACE) 100 MG capsule, Take 100 mg by mouth daily, Disp: , Rfl:     FLUoxetine (PROZAC) 10 MG capsule, Take 10 mg by mouth daily, Disp: , Rfl:     nystatin (MYCOSTATIN) 527133 UNIT/GM powder, Apply 3 times daily. , Disp: 60 g, Rfl: 1    atorvastatin (LIPITOR) 40 MG tablet, Take 1 tablet by mouth daily, Disp: 30 tablet, Rfl: 5    isosorbide mononitrate (IMDUR) 30 MG extended release tablet, Take 1 tablet by mouth daily Take morning of surgery with a sip of water, Disp: 30 tablet, Rfl: 5    warfarin (COUMADIN) 5 MG tablet, Take 1 tablet by mouth daily Take 1 po qd or as instructed by PCP, Disp: 30 tablet, Rfl: 5    lisinopril (PRINIVIL;ZESTRIL) 10 MG tablet, Take 1 tablet by mouth daily, Disp: 30 tablet, Rfl: 5    furosemide (LASIX) 20 MG tablet, Take 1 tablet by mouth daily, Disp: 30 tablet, Rfl: 5    potassium chloride (KLOR-CON) 20 MEQ packet, Take 20 mEq by mouth daily, Disp: 30 each, Rfl: 5  No Known Allergies    Past Medical History:   Diagnosis Date    AAA (abdominal aortic aneurysm) (HCC)     Anticoagulant long-term use 5/29/2019    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)     CAD (coronary artery disease)     CHF (congestive heart failure) (HCC)     Chronic pain syndrome 5/29/2019    HTN (hypertension)     Hx of blood clots 2015    right upper arm    Hyperlipemia      Past Surgical History:   Procedure Laterality Date    BACK SURGERY  2010    [facility], pt unsure    COLONOSCOPY      EYE SURGERY Bilateral     cataracts    FIXATION KYPHOPLASTY  10/02/2017    T12 kyphoplasty Dr Evans Dillard KYPHOPLASTY  11/20/2017    T11-L1 kypho    HYSTERECTOMY      HYSTERECTOMY, TOTAL ABDOMINAL      TONSILLECTOMY      TRANSESOPHAGEAL ECHOCARDIOGRAM  11/04/15     Family History   Problem Relation Age of Onset    Breast Cancer Mother [de-identified]    Heart Disease Father     Cancer Sister         melanoma     Social History     Socioeconomic History    Marital status:      Spouse name: Not on file    Number of children: Not on file    Years of education: Not on file    Highest education level: Not on file   Occupational History    Not on file   Social Needs    Financial resource strain: Patient refused    Food insecurity     Worry: Patient refused     Inability: Patient refused    Transportation needs     Medical: Patient refused     Non-medical: Patient refused   Tobacco Use    Smoking status: Former Smoker    Smokeless tobacco: Never Used    Tobacco comment: quit about 30 yrs ago   Substance and Sexual Activity    Alcohol use: No    Drug use: No    Sexual activity: Not on file   Lifestyle    Physical activity     Days per week: Not on file     Minutes per session: Not on file    Stress: Not on file   Relationships    Social connections     Talks on phone: Not on file     Gets together: Not on file     Attends [employer] service: Not on file     Active member of club or organization: Not on file     Attends meetings of clubs or organizations: Not on file     Relationship status: Not on file    Intimate partner violence     Fear of current or ex partner: Not on file     Emotionally abused: Not on file     Physically abused: Not on file     Forced sexual activity: Not on file   Other Topics Concern    Not on file   Social History Narrative    Not on file       Vitals: 08/05/20 1623   BP: 124/72   Pulse: 53   Temp: 97.3 °F (36.3 °C)   TempSrc: Temporal   SpO2: 92%   Weight: 168 lb 3.2 oz (76.3 kg)   Height: 5' 3\" (1.6 m)       Physical Exam    Physical exam not performed today          Assessment and Plan:  Tyron Thompson was seen today for hypertension and chronic pain. Diagnoses and all orders for this visit:    Anticoagulant long-term use    Chronic pain syndrome  -     Discontinue: oxyCODONE-acetaminophen (PERCOCET)  MG per tablet; Take 1 tablet by mouth 3 times daily as needed for Pain for up to 30 days. Compression fracture of body of thoracic vertebra (HCC)  -     Discontinue: oxyCODONE-acetaminophen (PERCOCET)  MG per tablet; Take 1 tablet by mouth 3 times daily as needed for Pain for up to 30 days. Chronic, continuous use of opioids  -     Discontinue: oxyCODONE-acetaminophen (PERCOCET)  MG per tablet; Take 1 tablet by mouth 3 times daily as needed for Pain for up to 30 days. Arterial embolism (HCC)  -     POCT INR    Other orders  -     warfarin (COUMADIN) 1 MG tablet; Take 1 tablet by mouth daily      Plan: See above body report. Prescription management performed. Fall precautions. Continue to follow with above consultants. Return in about 2 weeks (around 8/19/2020). Seen By:  Duane Ramming, MD      *Document was created using voice recognition software. Note was reviewed however may contain grammatical errors.

## 2020-08-24 ENCOUNTER — OFFICE VISIT (OUTPATIENT)
Dept: FAMILY MEDICINE CLINIC | Age: 85
End: 2020-08-24
Payer: MEDICARE

## 2020-08-24 VITALS
TEMPERATURE: 97.1 F | WEIGHT: 173 LBS | HEART RATE: 63 BPM | OXYGEN SATURATION: 88 % | SYSTOLIC BLOOD PRESSURE: 112 MMHG | HEIGHT: 63 IN | DIASTOLIC BLOOD PRESSURE: 72 MMHG | BODY MASS INDEX: 30.65 KG/M2

## 2020-08-24 LAB
INTERNATIONAL NORMALIZATION RATIO, POC: 4.3
PROTHROMBIN TIME, POC: 52.2

## 2020-08-24 PROCEDURE — G8427 DOCREV CUR MEDS BY ELIG CLIN: HCPCS | Performed by: INTERNAL MEDICINE

## 2020-08-24 PROCEDURE — 4040F PNEUMOC VAC/ADMIN/RCVD: CPT | Performed by: INTERNAL MEDICINE

## 2020-08-24 PROCEDURE — 1036F TOBACCO NON-USER: CPT | Performed by: INTERNAL MEDICINE

## 2020-08-24 PROCEDURE — 1123F ACP DISCUSS/DSCN MKR DOCD: CPT | Performed by: INTERNAL MEDICINE

## 2020-08-24 PROCEDURE — 1090F PRES/ABSN URINE INCON ASSESS: CPT | Performed by: INTERNAL MEDICINE

## 2020-08-24 PROCEDURE — 99214 OFFICE O/P EST MOD 30 MIN: CPT | Performed by: INTERNAL MEDICINE

## 2020-08-24 PROCEDURE — G8417 CALC BMI ABV UP PARAM F/U: HCPCS | Performed by: INTERNAL MEDICINE

## 2020-08-24 PROCEDURE — 85610 PROTHROMBIN TIME: CPT | Performed by: INTERNAL MEDICINE

## 2020-08-24 RX ORDER — OXYCODONE AND ACETAMINOPHEN 10; 325 MG/1; MG/1
1 TABLET ORAL 3 TIMES DAILY PRN
Qty: 90 TABLET | Refills: 0 | Status: SHIPPED | OUTPATIENT
Start: 2020-08-24 | End: 2020-09-23

## 2020-08-24 RX ORDER — OXYCODONE AND ACETAMINOPHEN 10; 325 MG/1; MG/1
1 TABLET ORAL 3 TIMES DAILY PRN
COMMUNITY
End: 2020-08-24 | Stop reason: SDUPTHER

## 2020-08-25 NOTE — PROGRESS NOTES
[address] [facility] [address] [facility]     20  eGorgina Sanchez : 3/1/1932 Sex: female  Age: 80 y.o. Chief Complaint   Patient presents with    Office Visit for Anticoagulation Management       HPI  Patient presents today for short-term follow-up visit to have INR check/anticoagulation management/follow-up. As it always does turns into a more complicated visit. Daughter brings her in and they seem to go back and forth. Patient has not been taking her furosemide daily. She is much more swollen. I told her she is going to need to start taking this and be more compliant. Her INR today was higher than it was last time at 4.3 she is having no bleeding or thrombotic episodes. She does show me area to right hand and forearm where her cat scratched her pretty good. She is up-to-date with tetanus within the past 5 years. There was no obvious bites. I did tell her she needs to watch closely for signs of infection. She is due and requesting her narcotic medication for chronic back pain. There is no evidence of abuse or diversion of the medication. Oarrs report was run and okay. Also comes to my attention that she is not using her oxygen. O2 sat today on room air sitting here in the office was 88%. I told her she  needs to start using this. I did review her recent blood work with her. She is supposed to be following with urology, podiatry, pulmonary, renal and recommendation to have her see cardiology for which she is overdue. Alec Popeye did also see hematology/oncology recently and I reviewed their note.  She was seen for macrocytosis      Review of Systems   Constitutional:  negative for activity change, chills, diaphoresis,  and fever.    HENT: Negative for congestion, ear pain, hearing loss, postnasal drip, rhinorrhea and sinus pain.    Respiratory: Positive for shortness of breath.-Stable negative for cough and wheezing.         COPD chronic oxygen use at night and as needed-has not been using  Cardiovascular: wheelchair  Upper  Extremities: Full ROM bilaterally. Lower Extremities: Limited range of motion.  swelling noted. /.  With improvement.  She does have some reproducible  tenderness along the lower thoracic upper lumbar region to palpation. No gross deformities noted. Pulses are  palpable. Psych: Patient's attitude is cooperative. Patient's affect is depressed and emotional today  Neurologically grossly intact without focal deficits noted         Assessment and Plan:  Pari Villegas was seen today for office visit for anticoagulation management. Diagnoses and all orders for this visit:    Chronic pain syndrome  -     oxyCODONE-acetaminophen (PERCOCET)  MG per tablet; Take 1 tablet by mouth 3 times daily as needed for Pain for up to 30 days. Stable on current medical regimen    Arterial embolism (HCC)  -     POCT INR  Currently anticoagulated and stable. Anticoagulant long-term use    Essential hypertension  Stable on medication    Edema, unspecified type  Edematous and not currently taking diuretic as directed. Fatigue, unspecified type    Cat scratch  Up-to-date with tetanus. Watch for signs of infection    Plan: See her back in 2 weeks with adjustment in her Coumadin to hold tomorrow as she already took today's  Then go to 2.5 mg Wednesday Thursday Friday and 1 mg rest of the week. Watch for any bleeding. Prescription management performed. Watch for signs of infection right hand and forearm from cat scratches. Lasix daily. We will check some blood work when I see her next going back on the Lasix. Back on O2 as directed. Falll precautions. Mini-Mental status exam at next visit. Last one done 4/19  Return in about 2 weeks (around 9/7/2020). Seen By:  Rosalia Fernandez MD      *Document was created using voice recognition software. Note was reviewed however may contain grammatical errors.

## 2020-09-09 ENCOUNTER — OFFICE VISIT (OUTPATIENT)
Dept: FAMILY MEDICINE CLINIC | Age: 85
End: 2020-09-09
Payer: MEDICARE

## 2020-09-09 VITALS
SYSTOLIC BLOOD PRESSURE: 110 MMHG | WEIGHT: 173 LBS | DIASTOLIC BLOOD PRESSURE: 66 MMHG | BODY MASS INDEX: 30.65 KG/M2 | OXYGEN SATURATION: 93 % | TEMPERATURE: 97.7 F | HEART RATE: 77 BPM | HEIGHT: 63 IN

## 2020-09-09 LAB
INTERNATIONAL NORMALIZATION RATIO, POC: 3.3
PROTHROMBIN TIME, POC: 39.2

## 2020-09-09 PROCEDURE — 1123F ACP DISCUSS/DSCN MKR DOCD: CPT | Performed by: INTERNAL MEDICINE

## 2020-09-09 PROCEDURE — 85610 PROTHROMBIN TIME: CPT | Performed by: INTERNAL MEDICINE

## 2020-09-09 PROCEDURE — 4040F PNEUMOC VAC/ADMIN/RCVD: CPT | Performed by: INTERNAL MEDICINE

## 2020-09-09 PROCEDURE — G8417 CALC BMI ABV UP PARAM F/U: HCPCS | Performed by: INTERNAL MEDICINE

## 2020-09-09 PROCEDURE — 1036F TOBACCO NON-USER: CPT | Performed by: INTERNAL MEDICINE

## 2020-09-09 PROCEDURE — 99214 OFFICE O/P EST MOD 30 MIN: CPT | Performed by: INTERNAL MEDICINE

## 2020-09-09 PROCEDURE — G8427 DOCREV CUR MEDS BY ELIG CLIN: HCPCS | Performed by: INTERNAL MEDICINE

## 2020-09-09 PROCEDURE — 1090F PRES/ABSN URINE INCON ASSESS: CPT | Performed by: INTERNAL MEDICINE

## 2020-09-09 RX ORDER — POTASSIUM CHLORIDE 1.5 G/1.77G
20 POWDER, FOR SOLUTION ORAL DAILY
Qty: 30 EACH | Refills: 5 | Status: SHIPPED
Start: 2020-09-09 | End: 2020-10-29

## 2020-09-09 RX ORDER — WARFARIN SODIUM 5 MG/1
5 TABLET ORAL DAILY
Qty: 30 TABLET | Refills: 5 | Status: SHIPPED
Start: 2020-09-09 | End: 2020-09-30 | Stop reason: ALTCHOICE

## 2020-09-09 RX ORDER — LISINOPRIL 10 MG/1
10 TABLET ORAL DAILY
Qty: 30 TABLET | Refills: 5 | Status: SHIPPED
Start: 2020-09-09 | End: 2020-09-30

## 2020-09-09 RX ORDER — FUROSEMIDE 20 MG/1
TABLET ORAL
Qty: 60 TABLET | Refills: 5 | Status: SHIPPED
Start: 2020-09-09 | End: 2020-10-29 | Stop reason: SDUPTHER

## 2020-09-09 RX ORDER — FLUOXETINE 10 MG/1
10 CAPSULE ORAL DAILY
Qty: 30 CAPSULE | Refills: 5 | Status: SHIPPED
Start: 2020-09-09 | End: 2021-01-27 | Stop reason: SDUPTHER

## 2020-09-09 RX ORDER — ATORVASTATIN CALCIUM 40 MG/1
40 TABLET, FILM COATED ORAL DAILY
Qty: 30 TABLET | Refills: 5 | Status: SHIPPED
Start: 2020-09-09 | End: 2021-01-27 | Stop reason: SDUPTHER

## 2020-09-09 RX ORDER — ISOSORBIDE MONONITRATE 30 MG/1
30 TABLET, EXTENDED RELEASE ORAL DAILY
Qty: 30 TABLET | Refills: 5 | Status: SHIPPED
Start: 2020-09-09 | End: 2021-01-27 | Stop reason: SDUPTHER

## 2020-09-09 NOTE — PROGRESS NOTES
[facility]     20  Lizzie Lindsey : 3/1/1932 Sex: female  Age: 80 y.o. Chief Complaint   Patient presents with    Office Visit for Anticoagulation Management    Fatigue     sleeps about 16 hrs a day and cant go without napping       HPI  Patient presents today for 2-week INR/anticoagulation management follow-up. Turned into much more as it usually does. She is accompanied by her daughter. States she is having CT urogram through urology tomorrow. They are going to check some labs on him prior to that scan. She is having significant urinary symptoms with incontinence. She does have lower extremity edema. States she is back on Lasix 20 mg daily. I told him I want her to double the dose over the next 2 weeks and will recheck labs again at that point. She needs to keep her legs up. She needs to use compression stockings or Ace wraps. She is not doing any of this. She is using her oxygen more frequently for her COPD. Laure Pollack Daughter tells me her saturations have been better. Daughter states that she is very fatigued sleeps up to 16 hours a day. I looked over her medications which do not seem to be anything that should be causing this outside of her oxycodone which she states she is not abusing. Her INR today was 3.3. This is supratherapeutic and we will make adjustments today to go to 1 mg daily except for  where she will be at 2.5 mg. She is supposed to be following with urology, podiatry, pulmonary, renal and recommendation to have her see cardiology for which she is overdue. Pauline Partida did also see hematology/oncology recently and I reviewed their note.  She was seen for macrocytosis  Does not look to be up-to-date pulmonary or cardiology. Review of Systems     Constitutional:  negative for activity change, chills, diaphoresis,  and fever.   Positive for fatigue  HENT: Negative for congestion, ear pain, hearing loss, postnasal drip, rhinorrhea and sinus pain.    Respiratory: Positive for shortness of breath.-Stable negative for cough and wheezing.         COPD chronic oxygen use at night and as needed-has not been using  Cardiovascular: Negative for chest pain, palpitations.  Positive for leg swelling.  -Improved from last couple visits. Gastrointestinal: Positive for constipation.   Negative for abdominal pain, blood in stool, diarrhea,  and vomiting.         Endocrine: Negative.    Genitourinary: Negative for difficulty urinating, dysuria, hematuria and urgency.         Reports frequent UTIs .does have some incontinence of urine following with urology. Johnnie Thomas hematuria has been worked up.  She no longer has urinary catheter.    Musculoskeletal: Positive for arthralgias.  back pain, and gait problem       Chronic pain syndrome/back pain--lower back pain and bilateral hip pain/chronic-continues on narcotic pain medication with success. T11-L1 vertebroplasty. Successful.   Skin: Negative.    Neurological: Negative for dizziness, weakness, light-headedness, numbness and headaches. Hematological: Negative.    Psychiatric/Behavioral: Admits to being more emotional and angry since taken off of her Prozac.-Placed back on again with improvement            REST OF PERTINENT ROS GONE OVER AND WAS NEGATIVE.      PMH:  Problem List: Chronic pain syndrome, Long-term current use of anticoagulant, Anticoagulant, Osteoarthritis, Taking  medication, Hyperlipidemia  Health Maintenance:  Mammogram - (1/23/2019)  Mammogram Screening - (1/23/2019)  Influenza Vaccination - (11/21/2018)  Bone Density Scan - (1/11/2011)  Colonoscopy - (11/12/2015)  Couseled on Home Safety - (4/12/2017)  Bone Density Test Screening - (5/23/2014)  Stress Test - 1/08-ok,3/11  EKG - 7/09,11,3/13  Rectal Exam - 8/10  Breast Exam - 8/10,declined,2/14  Hemmocult Cards - 2/13-neg x 3  2D ECHO - 8/15  Mini Mental Status - 4/19--29/30  Medical Problems:  Osteoarthritis  DVT - right-1/04  Hypertension  Lumbar Spondylosis-Herniated Disc-Spinal Stenosis - epidurals/[place]  Hyperlipidemia, YAIR-BSO--Non Cancerous  Small AAA - 3.4cm--- .--3.5 cm per CT urogram  HX FX Left Ankle, Chronic Greater Trochanteric Bursitis, Depression, Cataracts  Coronary Artery Disease (CAD) - Previously declined cardiology fu  neurogenic claudication  Spinal Stenosis - L2-5 lumbar laminectomy-dr massey,[facility]  Kidney Stones, Anxiety, Diverticulitis, Osteoporosis, post laminectomy syndrome lumbar spine  Goiter - multinodular  hematuria - cystoscopy/retrograde-worked up by urology. urethral stenosis - dialated  chronic urethral inflammation, strep bovis bacteremia, Congestive Heart Failure (CHF), arterial thrombosis right arm  T12 compression fracture. - Vertebroplasty  Lumbar Spondylosis-Herniated Disc-Spinal Stenosis  T11 and L1 kyphoplasty -   Dilated nonischemic cardiomyopathy - Follows with cardiology  copd  Reviewed and updated. FH:  Father:  MI -  late 63's. Mother:  Breast Cancer - ? ?  AGE 80. Son 1:  Chronic Obstructive Pulmonary Disease (COPD) -  age 61. Sister 1:  melanoma -  age in her 52's. Reviewed, no changes. SH:  Marital: . Personal Habits: Cigarette Use: Does Not Smoke. Alcohol: does not use alcohol.   Reviewed, no changes.                Current Outpatient Medications:     FLUoxetine (PROZAC) 10 MG capsule, Take 1 capsule by mouth daily, Disp: 30 capsule, Rfl: 5    atorvastatin (LIPITOR) 40 MG tablet, Take 1 tablet by mouth daily, Disp: 30 tablet, Rfl: 5    isosorbide mononitrate (IMDUR) 30 MG extended release tablet, Take 1 tablet by mouth daily Take morning of surgery with a sip of water, Disp: 30 tablet, Rfl: 5    potassium chloride (KLOR-CON) 20 MEQ packet, Take 20 mEq by mouth daily, Disp: 30 each, Rfl: 5    lisinopril (PRINIVIL;ZESTRIL) 10 MG tablet, Take 1 tablet by mouth daily, Disp: 30 tablet, Rfl: 5    furosemide (LASIX) 20 MG tablet, Take 2 pills qam, Disp: 60 tablet, Rfl: 5   warfarin (COUMADIN) 5 MG tablet, Take 1 tablet by mouth daily Take 1 po qd or as instructed by PCP, Disp: 30 tablet, Rfl: 5    oxyCODONE-acetaminophen (PERCOCET)  MG per tablet, Take 1 tablet by mouth 3 times daily as needed for Pain for up to 30 days. , Disp: 90 tablet, Rfl: 0    warfarin (COUMADIN) 1 MG tablet, Take 1 tablet by mouth daily, Disp: 30 tablet, Rfl: 3    Lactobacillus (PROBIOTIC ACIDOPHILUS PO), Take by mouth, Disp: , Rfl:     docusate sodium (COLACE) 100 MG capsule, Take 100 mg by mouth daily, Disp: , Rfl:     nystatin (MYCOSTATIN) 240938 UNIT/GM powder, Apply 3 times daily. , Disp: 60 g, Rfl: 1  No Known Allergies    Past Medical History:   Diagnosis Date    AAA (abdominal aortic aneurysm) (HCC)     Anticoagulant long-term use 5/29/2019    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)     CAD (coronary artery disease)     CHF (congestive heart failure) (HCC)     Chronic pain syndrome 5/29/2019    HTN (hypertension)     Hx of blood clots 2015    right upper arm    Hyperlipemia      Past Surgical History:   Procedure Laterality Date    BACK SURGERY  2010    [facility], pt unsure    COLONOSCOPY      EYE SURGERY Bilateral     cataracts    FIXATION KYPHOPLASTY  10/02/2017    T12 kyphoplasty Dr Janie Crouch KYPHOPLASTY  11/20/2017    T11-L1 kypho    HYSTERECTOMY      HYSTERECTOMY, TOTAL ABDOMINAL      TONSILLECTOMY      TRANSESOPHAGEAL ECHOCARDIOGRAM  11/04/15     Family History   Problem Relation Age of Onset    Breast Cancer Mother [de-identified]    Heart Disease Father     Cancer Sister         melanoma     Social History     Socioeconomic History    Marital status:      Spouse name: Not on file    Number of children: Not on file    Years of education: Not on file    Highest education level: Not on file   Occupational History    Not on file   Social Needs    Financial resource strain: Patient refused    Food insecurity     Worry: Patient refused Inability: Patient refused    Transportation needs     Medical: Patient refused     Non-medical: Patient refused   Tobacco Use    Smoking status: Former Smoker    Smokeless tobacco: Never Used    Tobacco comment: quit about 30 yrs ago   Substance and Sexual Activity    Alcohol use: No    Drug use: No    Sexual activity: Not on file   Lifestyle    Physical activity     Days per week: Not on file     Minutes per session: Not on file    Stress: Not on file   Relationships    Social connections     Talks on phone: Not on file     Gets together: Not on file     Attends Judaism service: Not on file     Active member of club or organization: Not on file     Attends meetings of clubs or organizations: Not on file     Relationship status: Not on file    Intimate partner violence     Fear of current or ex partner: Not on file     Emotionally abused: Not on file     Physically abused: Not on file     Forced sexual activity: Not on file   Other Topics Concern    Not on file   Social History Narrative    Not on file       Vitals:    09/09/20 1609   BP: 110/66   Pulse: 77   Temp: 97.7 °F (36.5 °C)   TempSrc: Temporal   SpO2: 93%   Weight: 173 lb (78.5 kg)   Height: 5' 3\" (1.6 m)       Physical Exam    Const: Appears well developed and well nourished. No signs of acute distress present. Neck: Supple and symmetric. Palpation reveals no adenopathy. No masses appreciated. Thyroid exhibits no nodules  or thyromegaly. No JVD. Carotids: 2+ and equal bilaterally, without bruits. Resp: Rhonchi, rales or wheezes appreciated over the lungs bilaterally.  Prolonged expiratory phase  CV: Rate is regular. Rhythm is regular. S1 is normal. S2 is normal. No gallop or rubs. No heart murmur  appreciated. Extremities: 3+ pitting edema left leg and 2+ pitting edema right leg, but no clubbing or cyanosis-left greater than right. Left side is typically larger than right  Abdomen: Bowel sounds are normoactive.  Palpation of the abdomen reveals softness, but no distension,  organomegaly or tenderness. No abdominal masses. No palpable hepatosplenomegaly. Musculo: Patient arrived at office in wheelchair  Upper  Extremities: Full ROM bilaterally. Lower Extremities: Limited range of motion.  swelling noted. /.   Beatriz Green does have some reproducible  tenderness along the lower thoracic upper lumbar region to palpation. No gross deformities noted. Pulses are  palpable. Psych: Patient's attitude is cooperative. Patient's affect is somewhat depressed  Neurologically grossly intact without focal deficits noted              Assessment and Plan:  Edward Dang was seen today for office visit for anticoagulation management and fatigue. Diagnoses and all orders for this visit:    Chronic pain syndrome    Essential hypertension  -     furosemide (LASIX) 20 MG tablet; Take 2 pills qam  -     Comprehensive Metabolic Panel; Future  -     CBC Auto Differential; Future  -     Lipid Panel; Future    Arterial embolism (HCC)  -     POCT INR    Edema, unspecified type    Anticoagulant long-term use    Fatigue, unspecified type    Macrocytosis    Other orders  -     FLUoxetine (PROZAC) 10 MG capsule; Take 1 capsule by mouth daily  -     atorvastatin (LIPITOR) 40 MG tablet; Take 1 tablet by mouth daily  -     isosorbide mononitrate (IMDUR) 30 MG extended release tablet; Take 1 tablet by mouth daily Take morning of surgery with a sip of water  -     potassium chloride (KLOR-CON) 20 MEQ packet; Take 20 mEq by mouth daily  -     lisinopril (PRINIVIL;ZESTRIL) 10 MG tablet; Take 1 tablet by mouth daily  -     warfarin (COUMADIN) 5 MG tablet; Take 1 tablet by mouth daily Take 1 po qd or as instructed by PCP      Plan: Blood work in the next 2 weeks. Increase Lasix to 40 mg daily. She will need her oxycodone in 2 weeks when she comes in for lab work which will be given to her. I donnell djusted her Coumadin as her INR was 3.3 and supratherapeutic.-See above.  Prescription management performed. Fall precautions. See above body report. Follow-up with above consultants. Mini-Mental status exam and consultant review at next visit. Notify us with problems in the interim. CT urogram tomorrow through urology  Return 2 week bw 1 month inr. Seen By:  Claudean December, MD      *Document was created using voice recognition software. Note was reviewed however may contain grammatical errors.

## 2020-09-10 ENCOUNTER — TELEPHONE (OUTPATIENT)
Dept: FAMILY MEDICINE CLINIC | Age: 85
End: 2020-09-10

## 2020-09-10 ENCOUNTER — HOSPITAL ENCOUNTER (OUTPATIENT)
Dept: HOSPITAL 83 - CT | Age: 85
Discharge: HOME | End: 2020-09-10
Attending: UROLOGY
Payer: MEDICARE

## 2020-09-10 DIAGNOSIS — R31.29: Primary | ICD-10-CM

## 2020-09-10 LAB
BUN BLDV-MCNC: 16 MG/DL (ref 7–24)
BUN SERPL-MCNC: 16 MG/DL (ref 7–24)
CREAT SERPL-MCNC: 0.96 MG/DL (ref 0.55–1.02)
CREAT SERPL-MCNC: 0.96 MG/DL (ref 0.55–1.02)
GFR AFRICAN AMERICAN: > 60 ML/MIN
GFR SERPL CREATININE-BSD FRML MDRD: 55 ML/MIN/

## 2020-09-10 NOTE — TELEPHONE ENCOUNTER
Pharmacist from [facility] calling about the potassium. She usually gets tablets, but this script is for the packets.

## 2020-09-11 NOTE — TELEPHONE ENCOUNTER
Pharmacist zhao at [address] [place] [address] & [facility]

## 2020-09-14 ENCOUNTER — HOSPITAL ENCOUNTER (INPATIENT)
Dept: HOSPITAL 83 - ED | Age: 85
LOS: 6 days | Discharge: HOME | DRG: 291 | End: 2020-09-20
Attending: INTERNAL MEDICINE | Admitting: INTERNAL MEDICINE
Payer: MEDICARE

## 2020-09-14 ENCOUNTER — TELEPHONE (OUTPATIENT)
Dept: FAMILY MEDICINE CLINIC | Age: 85
End: 2020-09-14

## 2020-09-14 VITALS — BODY MASS INDEX: 23.69 KG/M2 | HEIGHT: 65 IN | WEIGHT: 142.19 LBS

## 2020-09-14 VITALS — DIASTOLIC BLOOD PRESSURE: 62 MMHG | SYSTOLIC BLOOD PRESSURE: 115 MMHG

## 2020-09-14 VITALS — DIASTOLIC BLOOD PRESSURE: 68 MMHG

## 2020-09-14 DIAGNOSIS — I35.1: ICD-10-CM

## 2020-09-14 DIAGNOSIS — L03.115: ICD-10-CM

## 2020-09-14 DIAGNOSIS — E80.6: ICD-10-CM

## 2020-09-14 DIAGNOSIS — N17.0: ICD-10-CM

## 2020-09-14 DIAGNOSIS — R00.1: ICD-10-CM

## 2020-09-14 DIAGNOSIS — Z79.01: ICD-10-CM

## 2020-09-14 DIAGNOSIS — R18.8: ICD-10-CM

## 2020-09-14 DIAGNOSIS — R73.9: ICD-10-CM

## 2020-09-14 DIAGNOSIS — E78.5: ICD-10-CM

## 2020-09-14 DIAGNOSIS — D75.89: ICD-10-CM

## 2020-09-14 DIAGNOSIS — R74.0: ICD-10-CM

## 2020-09-14 DIAGNOSIS — E43: ICD-10-CM

## 2020-09-14 DIAGNOSIS — Z82.49: ICD-10-CM

## 2020-09-14 DIAGNOSIS — F32.9: ICD-10-CM

## 2020-09-14 DIAGNOSIS — K74.60: ICD-10-CM

## 2020-09-14 DIAGNOSIS — E87.6: ICD-10-CM

## 2020-09-14 DIAGNOSIS — R32: ICD-10-CM

## 2020-09-14 DIAGNOSIS — G47.34: ICD-10-CM

## 2020-09-14 DIAGNOSIS — Z79.899: ICD-10-CM

## 2020-09-14 DIAGNOSIS — I82.409: ICD-10-CM

## 2020-09-14 DIAGNOSIS — Z80.3: ICD-10-CM

## 2020-09-14 DIAGNOSIS — E55.9: ICD-10-CM

## 2020-09-14 DIAGNOSIS — I11.0: Primary | ICD-10-CM

## 2020-09-14 DIAGNOSIS — I50.810: ICD-10-CM

## 2020-09-14 DIAGNOSIS — L03.116: ICD-10-CM

## 2020-09-14 DIAGNOSIS — I50.33: ICD-10-CM

## 2020-09-14 LAB
ABSOLUTE BASO #: 0.1 10*3/UL (ref 0–0.1)
ABSOLUTE EOS #: 0.1 10*3/UL (ref 0–0.4)
ABSOLUTE NEUT #: 6.1 10*3/UL (ref 2.3–7.9)
ALBUMIN SERPL-MCNC: 2.2 GM/DL (ref 3.1–4.5)
ALBUMIN: 2.2 GM/DL (ref 3.1–4.5)
ALP BLD-CCNC: 147 U/L (ref 45–117)
ALP SERPL-CCNC: 147 U/L (ref 45–117)
ALT SERPL W P-5'-P-CCNC: 15 U/L (ref 12–78)
ALT SERPL-CCNC: 15 U/L (ref 12–78)
APTT PPP: 38.7 SECONDS (ref 20–32.1)
APTT: 38.7 SECONDS (ref 20–32.1)
AST SERPL-CCNC: 41 IU/L (ref 3–35)
AST SERPL-CCNC: 41 IU/L (ref 3–35)
BASOPHILS # BLD AUTO: 0.1 10*3/UL (ref 0–0.1)
BASOPHILS %: 0.6 % (ref 0–1)
BASOPHILS NFR BLD AUTO: 0.6 % (ref 0–1)
BILIRUB SERPL-MCNC: 1.1 MG/DL (ref 0.2–1)
BUN BLDV-MCNC: 14 MG/DL (ref 7–24)
BUN SERPL-MCNC: 14 MG/DL (ref 7–24)
C-REACTIVE PROTEIN: 2.92 MG/DL (ref 0–0.3)
CALCIUM SERPL-MCNC: 8.1 MG/DL (ref 8.5–10.5)
CHLORIDE BLD-SCNC: 105 MMOL/L (ref 98–107)
CHLORIDE SERPL-SCNC: 105 MMOL/L (ref 98–107)
CO2: 30 MMOL/L (ref 21–32)
CREAT SERPL-MCNC: 1.09 MG/DL (ref 0.55–1.02)
CREAT SERPL-MCNC: 1.09 MG/DL (ref 0.55–1.02)
EOSINOPHIL # BLD AUTO: 0.1 10*3/UL (ref 0–0.4)
EOSINOPHIL # BLD AUTO: 0.8 % (ref 1–4)
EOSINOPHILS %: 0.8 % (ref 1–4)
ERYTHROCYTE [DISTWIDTH] IN BLOOD BY AUTOMATED COUNT: 16.9 % (ref 0–14.5)
GFR AFRICAN AMERICAN: 57 ML/MIN
GFR SERPL CREATININE-BSD FRML MDRD: 47 ML/MIN/
GLUCOSE: 109 MG/DL (ref 65–99)
HCT VFR BLD AUTO: 45.5 % (ref 37–47)
HCT VFR BLD CALC: 45.5 % (ref 37–47)
HEMOGLOBIN: 14 G/DL (ref 12–16)
IMMATURE GRANULOCYTES #: 0 10*3/UL (ref 0–0.1)
IMMATURE GRANULOCYTES: 0.3 % (ref 0–1)
INR BLD: 2.9 (ref 2–3.5)
INR BLD: 2.9 (ref 2–3.5)
LACTIC ACID: 2 MMOL/L (ref 0.4–2)
LIPASE SERPL-CCNC: 37 U/L (ref 73–393)
LIPASE: 37 U/L (ref 73–393)
LYMPHOCYTE %: 11.9 % (ref 27–41)
LYMPHOCYTES # BLD AUTO: 0.9 10*3/UL (ref 1.3–4.4)
LYMPHOCYTES # BLD: 0.9 10*3/UL (ref 1.3–4.4)
LYMPHOCYTES NFR BLD AUTO: 11.9 % (ref 27–41)
MAGNESIUM: 2.1 MG/DL (ref 1.5–2.1)
MCH RBC QN AUTO: 31.8 PG (ref 27–31)
MCH RBC QN AUTO: 31.8 PG (ref 27–31)
MCHC RBC AUTO-ENTMCNC: 30.8 G/DL (ref 33–37)
MCHC RBC AUTO-ENTMCNC: 30.8 G/DL (ref 33–37)
MCV RBC AUTO: 103.4 FL (ref 81–99)
MCV RBC AUTO: 103.4 FL (ref 81–99)
MONOCYTES # BLD AUTO: 0.6 10*3/UL (ref 0.1–1)
MONOCYTES # BLD: 0.6 10*3/UL (ref 0.1–1)
MONOCYTES %: 8.2 % (ref 3–9)
MONOCYTES NFR BLD MANUAL: 8.2 % (ref 3–9)
NEUT #: 6.1 10*3/UL (ref 2.3–7.9)
NEUT %: 78.2 % (ref 47–73)
NEUTROPHILS %: 78.2 % (ref 47–73)
NRBC BLD QL AUTO: 0 10*3/UL (ref 0–0)
NUCLEATED RED BLOOD CELLS: 0 % (ref 0–0)
PDW BLD-RTO: 16.9 % (ref 0–14.5)
PLATELET # BLD AUTO: 254 10*3/UL (ref 130–400)
PLATELET # BLD: 254 10*3/UL (ref 130–400)
PMV BLD AUTO: 10.5 FL (ref 9.6–12.3)
PMV BLD AUTO: 10.5 FL (ref 9.6–12.3)
POTASSIUM SERPL-SCNC: 5 MMOL/L (ref 3.5–5.1)
POTASSIUM SERPL-SCNC: 5 MMOL/L (ref 3.5–5.1)
PRO BNP, N-TERMINAL: 9687 PG/ML (ref 0–450)
PROT SERPL-MCNC: 7.5 GM/DL (ref 6.4–8.2)
PROTHROMBIN TIME: 30.3 SECONDS (ref 8.9–12.2)
RBC # BLD AUTO: 4.4 10*6/UL (ref 4.1–5.1)
RBC # BLD: 4.4 10*6/UL (ref 4.1–5.1)
SODIUM BLD-SCNC: 138 MMOL/L (ref 136–145)
SODIUM SERPL-SCNC: 138 MMOL/L (ref 136–145)
TOTAL PROTEIN: 7.5 GM/DL (ref 6.4–8.2)
TROPONIN I SERPL-MCNC: < 0.015 NG/ML (ref ?–0.04)
TROPONIN I: < 0.015 NG/ML
TROPONIN I: < 0.015 NG/ML
WBC # BLD: 7.8 10*3/UL (ref 4.8–10.8)
WBC NRBC COR # BLD AUTO: 7.8 10*3/UL (ref 4.8–10.8)

## 2020-09-14 NOTE — NUR
Time: 2255
An 88 year old FEMALE admitted to 
under services of SALOME SANDERS DO, Pt. arrived via stretcher from
ER.  Chief complaint: ABNORMAL LABS-SENT FROM 'S OFFICE.
 
EMELY BRANTLEY

## 2020-09-14 NOTE — NUR
MED REC UP TO DATE PER DAUGHTER LIYAH.
 
LIYAH CAN BE REACHED AT:
HOME: [phone]
CELL: [phone]

## 2020-09-15 VITALS — SYSTOLIC BLOOD PRESSURE: 102 MMHG | DIASTOLIC BLOOD PRESSURE: 52 MMHG

## 2020-09-15 VITALS — DIASTOLIC BLOOD PRESSURE: 46 MMHG

## 2020-09-15 VITALS — DIASTOLIC BLOOD PRESSURE: 62 MMHG

## 2020-09-15 VITALS — DIASTOLIC BLOOD PRESSURE: 64 MMHG

## 2020-09-15 LAB
25(OH)D3 SERPL-MCNC: 14.8 NG/ML (ref 30–100)
ABSOLUTE BASO #: 0 10*3/UL (ref 0–0.1)
ABSOLUTE EOS #: 0.1 10*3/UL (ref 0–0.4)
ABSOLUTE NEUT #: 4.3 10*3/UL (ref 2.3–7.9)
ALBUMIN SERPL-MCNC: 2.1 GM/DL (ref 3.1–4.5)
ALBUMIN: 2.1 GM/DL (ref 3.1–4.5)
ALP BLD-CCNC: 122 U/L (ref 45–117)
ALP SERPL-CCNC: 122 U/L (ref 45–117)
ALT SERPL W P-5'-P-CCNC: 12 U/L (ref 12–78)
ALT SERPL-CCNC: 12 U/L (ref 12–78)
AST SERPL-CCNC: 21 IU/L (ref 3–35)
AST SERPL-CCNC: 21 IU/L (ref 3–35)
BASOPHILS # BLD AUTO: 0 10*3/UL (ref 0–0.1)
BASOPHILS %: 0.7 % (ref 0–1)
BASOPHILS NFR BLD AUTO: 0.7 % (ref 0–1)
BILIRUB SERPL-MCNC: 1 MG/DL (ref 0.2–1)
BUN BLDV-MCNC: 14 MG/DL (ref 7–24)
BUN SERPL-MCNC: 14 MG/DL (ref 7–24)
CALCIUM SERPL-MCNC: 8 MG/DL (ref 8.5–10.5)
CHLORIDE BLD-SCNC: 107 MMOL/L (ref 98–107)
CHLORIDE SERPL-SCNC: 107 MMOL/L (ref 98–107)
CO2: 31 MMOL/L (ref 21–32)
CREAT SERPL-MCNC: 1.05 MG/DL (ref 0.55–1.02)
CREAT SERPL-MCNC: 1.05 MG/DL (ref 0.55–1.02)
EOSINOPHIL # BLD AUTO: 0.1 10*3/UL (ref 0–0.4)
EOSINOPHIL # BLD AUTO: 2.1 % (ref 1–4)
EOSINOPHILS %: 2.1 % (ref 1–4)
ERYTHROCYTE [DISTWIDTH] IN BLOOD BY AUTOMATED COUNT: 16.8 % (ref 0–14.5)
FOLIC ACID, SERUM: 5.71 NG/ML
GFR AFRICAN AMERICAN: 59 ML/MIN
GFR SERPL CREATININE-BSD FRML MDRD: 49 ML/MIN/
GLUCOSE: 73 MG/DL (ref 65–99)
HCT VFR BLD AUTO: 42.2 % (ref 37–47)
HCT VFR BLD CALC: 42.2 % (ref 37–47)
HEMOGLOBIN: 13.1 G/DL (ref 12–16)
IMMATURE GRANULOCYTES #: 0 10*3/UL (ref 0–0.1)
IMMATURE GRANULOCYTES: 0.2 % (ref 0–1)
INR BLD: 2.8 (ref 2–3.5)
INR BLD: 2.8 (ref 2–3.5)
LYMPHOCYTE %: 15.5 % (ref 27–41)
LYMPHOCYTES # BLD AUTO: 0.9 10*3/UL (ref 1.3–4.4)
LYMPHOCYTES # BLD: 0.9 10*3/UL (ref 1.3–4.4)
LYMPHOCYTES NFR BLD AUTO: 15.5 % (ref 27–41)
MAGNESIUM: 2 MG/DL (ref 1.5–2.1)
MCH RBC QN AUTO: 32 PG (ref 27–31)
MCH RBC QN AUTO: 32 PG (ref 27–31)
MCHC RBC AUTO-ENTMCNC: 31 G/DL (ref 33–37)
MCHC RBC AUTO-ENTMCNC: 31 G/DL (ref 33–37)
MCV RBC AUTO: 102.9 FL (ref 81–99)
MCV RBC AUTO: 102.9 FL (ref 81–99)
MONOCYTES # BLD AUTO: 0.6 10*3/UL (ref 0.1–1)
MONOCYTES # BLD: 0.6 10*3/UL (ref 0.1–1)
MONOCYTES %: 10.5 % (ref 3–9)
MONOCYTES NFR BLD MANUAL: 10.5 % (ref 3–9)
NEUT #: 4.3 10*3/UL (ref 2.3–7.9)
NEUT %: 71 % (ref 47–73)
NEUTROPHILS %: 71 % (ref 47–73)
NRBC BLD QL AUTO: 0 % (ref 0–0)
NUCLEATED RED BLOOD CELLS: 0 % (ref 0–0)
PDW BLD-RTO: 16.8 % (ref 0–14.5)
PHOSPHORUS: 3 MG/DL (ref 2.5–4.9)
PLATELET # BLD AUTO: 218 10*3/UL (ref 130–400)
PLATELET # BLD: 218 10*3/UL (ref 130–400)
PMV BLD AUTO: 10.3 FL (ref 9.6–12.3)
PMV BLD AUTO: 10.3 FL (ref 9.6–12.3)
POTASSIUM SERPL-SCNC: 3.8 MMOL/L (ref 3.5–5.1)
POTASSIUM SERPL-SCNC: 3.8 MMOL/L (ref 3.5–5.1)
PROT SERPL-MCNC: 6.5 GM/DL (ref 6.4–8.2)
PROTHROMBIN TIME: 28.6 SECONDS (ref 8.9–12.2)
RBC # BLD AUTO: 4.1 10*6/UL (ref 4.1–5.1)
RBC # BLD: 4.1 10*6/UL (ref 4.1–5.1)
SODIUM BLD-SCNC: 140 MMOL/L (ref 136–145)
SODIUM SERPL-SCNC: 140 MMOL/L (ref 136–145)
TOTAL PROTEIN: 6.5 GM/DL (ref 6.4–8.2)
TROPONIN I: < 0.015 NG/ML
VITAMIN B-12: 728 PG/ML (ref 247–911)
VITAMIN B12: 728 PG/ML (ref 247–911)
VITAMIN D 25-HYDROXY: 14.8 NG/ML (ref 30–100)
WBC # BLD: 6.1 10*3/UL (ref 4.8–10.8)
WBC NRBC COR # BLD AUTO: 6.1 10*3/UL (ref 4.8–10.8)

## 2020-09-15 NOTE — NUR
PHYSICAL THERAPY
 
 
Screen received pt admitted from home with CHF,Ascites,BLE cellulitis,
please consult PT if pt has a decline in functional status from baseline,
thank you.
 
 
Ratna Mauricio PT

## 2020-09-15 NOTE — TELEPHONE ENCOUNTER
I did speak today with Dr. Monique Shell of urology regarding CAT scan results/ascites and cirrhotic picture. I also spoke with Nilay Gaston the daughter about the situation as well as his [facility] ER physician whom I told of the history and the fact that I was sending her over there. I feel she needs admitted and evaluated. Radiology recommending paracentesis. ER physician said Dr. Chapis Arana of surgery would look at her in consultation. She needs diuretic adjustment laboratory follow-up paracentesis I would highly recommend consideration of placement of this lady  Daughter Nilay Gaston also made mention the patient made comment to her briefly of suicidal ideation. She does not think she would do anything and has no specific plans to do so, however I did tell her to make sure the ER physician is aware of this when she is taken in.

## 2020-09-15 NOTE — NUR
CHRISTINEW RECEIVED DPOA-HC PAPERS FROM PATIENTS DAUGHTER. WILL HAVE A COLLEAGUE
WITNESS PATIENT SIGNATURE.

## 2020-09-15 NOTE — NUR
Nursing screen received and chart reviewed. Patient admitted for acute
exacerbation CHF and ascites. If patient has a decline in ADLs, transfers, or
functional mobility, please send OT orders. Thank you.
 
Yanet Treviño, OTR/L

## 2020-09-15 NOTE — NUR
in to talk to patient.
Patient states lives at home with .
There are no steps in the home.
Physician: [facility]
Pharmacy: [facility]
Home health services: none
Patient's level of ADLs: MINIMAL ASSIST
Patient has working utilities: all working
DME: walker
Follow-up physician's appointment after d/c: will be made by hospitalist nurse
director upon discharge
Does patient want to access PORTAL?: no
Discharge plan discussed with patient, she states she lives at home with her
, she uses a walker for ambulation and is independent in adls, she
states she is having some difficulty lately ambulation, she states she feels
weak, discussed with her a short term skilled nursing for 5 days of rehab and
24 hour care, she became angry and stated she would not even consider going to
a SNF, case management also dicussed [facility] and educated her on the services they
provide. she stated she has used [facility] in the past and is familiar with what they
offer, she wanted to think about VNA services, case management will follow.
 
KAVITA SALMERON

## 2020-09-15 NOTE — NUR
NOTIFIED OF ORDER FOR STRICT I&O NOT POSSIBLE DUE TO PT BEING
INCONTINENT IN BRIEF. RN QUESTIONED NEED FOR HUGHES. NO HUGHES PER .
UNABLE TO COLLECT UA^UC FOR THIS REASON.
 
 ALSO NOTIFED OF MED REC UP TO DATE PER DAUGHTER.

## 2020-09-16 VITALS — DIASTOLIC BLOOD PRESSURE: 56 MMHG

## 2020-09-16 VITALS — DIASTOLIC BLOOD PRESSURE: 50 MMHG

## 2020-09-16 VITALS — DIASTOLIC BLOOD PRESSURE: 52 MMHG

## 2020-09-16 VITALS — DIASTOLIC BLOOD PRESSURE: 44 MMHG

## 2020-09-16 LAB
ABSOLUTE BASO #: 0 10*3/UL (ref 0–0.1)
ABSOLUTE EOS #: 0.1 10*3/UL (ref 0–0.4)
ABSOLUTE NEUT #: 4.6 10*3/UL (ref 2.3–7.9)
BASOPHILS # BLD AUTO: 0 10*3/UL (ref 0–0.1)
BASOPHILS %: 0.6 % (ref 0–1)
BASOPHILS NFR BLD AUTO: 0.6 % (ref 0–1)
BUN BLDV-MCNC: 12 MG/DL (ref 7–24)
BUN SERPL-MCNC: 12 MG/DL (ref 7–24)
CALCIUM SERPL-MCNC: 7.5 MG/DL (ref 8.5–10.5)
CHLORIDE BLD-SCNC: 105 MMOL/L (ref 98–107)
CHLORIDE SERPL-SCNC: 105 MMOL/L (ref 98–107)
CO2: 32 MMOL/L (ref 21–32)
CREAT SERPL-MCNC: 0.88 MG/DL (ref 0.55–1.02)
CREAT SERPL-MCNC: 0.88 MG/DL (ref 0.55–1.02)
EOSINOPHIL # BLD AUTO: 0.1 10*3/UL (ref 0–0.4)
EOSINOPHIL # BLD AUTO: 1.9 % (ref 1–4)
EOSINOPHILS %: 1.9 % (ref 1–4)
ERYTHROCYTE [DISTWIDTH] IN BLOOD BY AUTOMATED COUNT: 16.7 % (ref 0–14.5)
GFR AFRICAN AMERICAN: > 60 ML/MIN
GFR SERPL CREATININE-BSD FRML MDRD: >60 ML/MIN/
GLUCOSE: 70 MG/DL (ref 65–99)
HCT VFR BLD AUTO: 39.1 % (ref 37–47)
HCT VFR BLD CALC: 39.1 % (ref 37–47)
HEMOGLOBIN: 12.3 G/DL (ref 12–16)
IMMATURE GRANULOCYTES #: 0 10*3/UL (ref 0–0.1)
IMMATURE GRANULOCYTES: 0.3 % (ref 0–1)
INR BLD: 2.1 (ref 2–3.5)
INR BLD: 2.1 (ref 2–3.5)
LYMPHOCYTE %: 13.7 % (ref 27–41)
LYMPHOCYTES # BLD AUTO: 0.9 10*3/UL (ref 1.3–4.4)
LYMPHOCYTES # BLD: 0.9 10*3/UL (ref 1.3–4.4)
LYMPHOCYTES NFR BLD AUTO: 13.7 % (ref 27–41)
MCH RBC QN AUTO: 32.3 PG (ref 27–31)
MCH RBC QN AUTO: 32.3 PG (ref 27–31)
MCHC RBC AUTO-ENTMCNC: 31.5 G/DL (ref 33–37)
MCHC RBC AUTO-ENTMCNC: 31.5 G/DL (ref 33–37)
MCV RBC AUTO: 102.6 FL (ref 81–99)
MCV RBC AUTO: 102.6 FL (ref 81–99)
MONOCYTES # BLD AUTO: 0.8 10*3/UL (ref 0.1–1)
MONOCYTES # BLD: 0.8 10*3/UL (ref 0.1–1)
MONOCYTES %: 11.8 % (ref 3–9)
MONOCYTES NFR BLD MANUAL: 11.8 % (ref 3–9)
NEUT #: 4.6 10*3/UL (ref 2.3–7.9)
NEUT %: 71.7 % (ref 47–73)
NEUTROPHILS %: 71.7 % (ref 47–73)
NRBC BLD QL AUTO: 0 % (ref 0–0)
NUCLEATED RED BLOOD CELLS: 0 % (ref 0–0)
PDW BLD-RTO: 16.7 % (ref 0–14.5)
PLATELET # BLD AUTO: 201 10*3/UL (ref 130–400)
PLATELET # BLD: 201 10*3/UL (ref 130–400)
PMV BLD AUTO: 10.2 FL (ref 9.6–12.3)
PMV BLD AUTO: 10.2 FL (ref 9.6–12.3)
POTASSIUM SERPL-SCNC: 3.2 MMOL/L (ref 3.5–5.1)
POTASSIUM SERPL-SCNC: 3.2 MMOL/L (ref 3.5–5.1)
PROTHROMBIN TIME: 22.1 SECONDS (ref 8.9–12.2)
RBC # BLD AUTO: 3.81 10*6/UL (ref 4.1–5.1)
RBC # BLD: 3.81 10*6/UL (ref 4.1–5.1)
SODIUM BLD-SCNC: 141 MMOL/L (ref 136–145)
SODIUM SERPL-SCNC: 141 MMOL/L (ref 136–145)
WBC # BLD: 6.4 10*3/UL (ref 4.8–10.8)
WBC NRBC COR # BLD AUTO: 6.4 10*3/UL (ref 4.8–10.8)

## 2020-09-16 NOTE — NUR
PATIENT AWAKE, LAYING IN BED. VOICES NO COMPLAINTS. RESPIRATIONS EASY, NON
LABORED. BED IN LOWEST POSITION CALL LIGHT WITHIN REACH. WILL CONTINUE TO
MONITOR.

## 2020-09-16 NOTE — NUR
PHYSICAL THERAPY
 
Attempted to see pt for evaluation at the bedside, pt declining therapy at
this time stating "I just need to rest, they keep waking me up" will follow at
at later date spoke with primary nurse Lovely Mauricio PT

## 2020-09-16 NOTE — NUR
Occupational Therapy evaluation completed on four with full evaluation to
follow.  Recommend occupational therapy per plan of care and SNF upon
discharge. Patient became upset when discussing SNF recommendation. OTR
discussed benefits of SNF and continued therapy to maximize safety and
independence with ADLs, transfers prior to return to home however patient
declined. Discussed HH with increased supervision assistance at home and
patient continued to decline HH.  Spoke with CM following in regards to SNF
discussion with patient.  Thank you for this referral.
 
Yanet Treviño, OTR/CHAVA

## 2020-09-16 NOTE — NUR
case management visits with patient, again discussed with her a discharge plan
including a short term skilled nursing for rehab prior to returning home, she
declined, also discussed VNA and she wasn't sure she wanted this either. case
management asked for physical therapy and occupational therapy orders to see
how well she ambulates and can care for herself, will talk with patient again
after the pt/ot eval

## 2020-09-16 NOTE — NUR
OT NOTE
Occupational therapy order received and chart reviewed. Patient supine in bed
with HOB elevated upon arrival. Patient declining an OT evaluation at this
time stating "No, I need to rest. They keep waking me up." Patient educated on
benefits/purpose of therapy and continued to refuse. Will check back at a
later date for completion of an OT evaluation. Thank you.
 
Yanet Treviño, OTR/L

## 2020-09-16 NOTE — NUR
PHYSICAL THERAPY
 
Physical Therapy evaluation completed on 4th floor with full evaluation to
follow.  Recommend physical therapy per plan of care and SNF however pt
adamently refusing any SNF or HH services, did discuss w CM.  Pt will require
24 hr care upon discharge.  Thank you for this referral.
 
Ratna Mauricio PT

## 2020-09-16 NOTE — NUR
MEDICATED WITH PRN PERCOCET AS PER ORDER AND REQUEST.  PATIENT ABSOLUTELY
REFUSED TO BE SWABBED FOR CANCINO STATES SHE IS GOING HOME NOT TO ANY FACILITY.

## 2020-09-17 ENCOUNTER — TELEPHONE (OUTPATIENT)
Dept: FAMILY MEDICINE CLINIC | Age: 85
End: 2020-09-17

## 2020-09-17 VITALS — DIASTOLIC BLOOD PRESSURE: 50 MMHG | SYSTOLIC BLOOD PRESSURE: 96 MMHG

## 2020-09-17 VITALS — SYSTOLIC BLOOD PRESSURE: 157 MMHG | DIASTOLIC BLOOD PRESSURE: 62 MMHG

## 2020-09-17 VITALS — DIASTOLIC BLOOD PRESSURE: 51 MMHG

## 2020-09-17 VITALS — SYSTOLIC BLOOD PRESSURE: 120 MMHG | DIASTOLIC BLOOD PRESSURE: 60 MMHG

## 2020-09-17 LAB
ABSOLUTE BASO #: 0 10*3/UL (ref 0–0.1)
ABSOLUTE EOS #: 0.1 10*3/UL (ref 0–0.4)
ABSOLUTE NEUT #: 4.4 10*3/UL (ref 2.3–7.9)
BASOPHILS # BLD AUTO: 0 10*3/UL (ref 0–0.1)
BASOPHILS %: 0.5 % (ref 0–1)
BASOPHILS NFR BLD AUTO: 0.5 % (ref 0–1)
BUN BLDV-MCNC: 11 MG/DL (ref 7–24)
BUN SERPL-MCNC: 11 MG/DL (ref 7–24)
CALCIUM SERPL-MCNC: 7.7 MG/DL (ref 8.5–10.5)
CHLORIDE BLD-SCNC: 104 MMOL/L (ref 98–107)
CHLORIDE SERPL-SCNC: 104 MMOL/L (ref 98–107)
CO2: 34 MMOL/L (ref 21–32)
CREAT SERPL-MCNC: 0.96 MG/DL (ref 0.55–1.02)
CREAT SERPL-MCNC: 0.96 MG/DL (ref 0.55–1.02)
EOSINOPHIL # BLD AUTO: 0.1 10*3/UL (ref 0–0.4)
EOSINOPHIL # BLD AUTO: 1.9 % (ref 1–4)
EOSINOPHILS %: 1.9 % (ref 1–4)
ERYTHROCYTE [DISTWIDTH] IN BLOOD BY AUTOMATED COUNT: 16.7 % (ref 0–14.5)
GFR AFRICAN AMERICAN: > 60 ML/MIN
GFR SERPL CREATININE-BSD FRML MDRD: 55 ML/MIN/
GLUCOSE: 78 MG/DL (ref 65–99)
HCT VFR BLD AUTO: 40.9 % (ref 37–47)
HCT VFR BLD CALC: 40.9 % (ref 37–47)
HEMOGLOBIN: 12.8 G/DL (ref 12–16)
IMMATURE GRANULOCYTES #: 0 10*3/UL (ref 0–0.1)
IMMATURE GRANULOCYTES: 0.3 % (ref 0–1)
INR BLD: 1.8 (ref 2–3.5)
INR BLD: 1.8 (ref 2–3.5)
LYMPHOCYTE %: 15.2 % (ref 27–41)
LYMPHOCYTES # BLD AUTO: 0.9 10*3/UL (ref 1.3–4.4)
LYMPHOCYTES # BLD: 0.9 10*3/UL (ref 1.3–4.4)
LYMPHOCYTES NFR BLD AUTO: 15.2 % (ref 27–41)
MCH RBC QN AUTO: 32.5 PG (ref 27–31)
MCH RBC QN AUTO: 32.5 PG (ref 27–31)
MCHC RBC AUTO-ENTMCNC: 31.3 G/DL (ref 33–37)
MCHC RBC AUTO-ENTMCNC: 31.3 G/DL (ref 33–37)
MCV RBC AUTO: 103.8 FL (ref 81–99)
MCV RBC AUTO: 103.8 FL (ref 81–99)
MONOCYTES # BLD AUTO: 0.7 10*3/UL (ref 0.1–1)
MONOCYTES # BLD: 0.7 10*3/UL (ref 0.1–1)
MONOCYTES %: 10.5 % (ref 3–9)
MONOCYTES NFR BLD MANUAL: 10.5 % (ref 3–9)
NEUT #: 4.4 10*3/UL (ref 2.3–7.9)
NEUT %: 71.6 % (ref 47–73)
NEUTROPHILS %: 71.6 % (ref 47–73)
NRBC BLD QL AUTO: 0 % (ref 0–0)
NUCLEATED RED BLOOD CELLS: 0 % (ref 0–0)
PDW BLD-RTO: 16.7 % (ref 0–14.5)
PLATELET # BLD AUTO: 219 10*3/UL (ref 130–400)
PLATELET # BLD: 219 10*3/UL (ref 130–400)
PMV BLD AUTO: 10.3 FL (ref 9.6–12.3)
PMV BLD AUTO: 10.3 FL (ref 9.6–12.3)
POTASSIUM SERPL-SCNC: 3.6 MMOL/L (ref 3.5–5.1)
POTASSIUM SERPL-SCNC: 3.6 MMOL/L (ref 3.5–5.1)
PROTHROMBIN TIME: 18.7 SECONDS (ref 8.9–12.2)
RBC # BLD AUTO: 3.94 10*6/UL (ref 4.1–5.1)
RBC # BLD: 3.94 10*6/UL (ref 4.1–5.1)
SODIUM BLD-SCNC: 139 MMOL/L (ref 136–145)
SODIUM SERPL-SCNC: 139 MMOL/L (ref 136–145)
VANCOMYCIN TROUGH: 13.9 UG/ML (ref 10–20)
WBC # BLD: 6.2 10*3/UL (ref 4.8–10.8)
WBC NRBC COR # BLD AUTO: 6.2 10*3/UL (ref 4.8–10.8)

## 2020-09-17 NOTE — NUR
PHYSICAL THERAPY
 
Patient seen this am 1:1 for therapy visit and was supine in bed upon
therapist arrival. Patient identified by name /  and was joined by OT
assistant this session for observation only as patient transfers supine to sit
EOB with MOD A. Patient needed a minute or so static EOB sit to collect
herself, then completed sit to stand transfer MIN A with use of wh walker
standing support. Patient tolerated approx 5 minutes static stand before
completing SPT to BSC, wh walker, MIN A, requiring v/c to improve safe walker
navigation. Patient returned to bedside chair and remained semi reclined with
call light, tray table, telephone, body alarm for safety. Will continue per
POC as tolerated, total treatment time 17 minutes.   Erickson Stoll, PTA

## 2020-09-17 NOTE — NUR
OT NOTE
Pt laying supine in bed with head elevated agreeable to 25 minute OT session.
Identified by name and date of birth with no complaints. Pt presented
to OT with 2L continuous O2. Pt alert and oriented x3. Transfer supine to EOB
modA. Pt educated on compensatory method to efrain sockc, however was unable to
complete due to pain in feet requiring maxA to efrain hospital socks. Sit-stand
from EOB Winnie with w/w for UB support and safety. modA to change hospital gown
due to IV. MaxA to doff and efrain depend due to incontinence. Stand pivot from
EOB to BSC CGA with w/w for saftey.  Transferring on and off commode Winnie due
to low commode. stand pivot from BSC to EOB CGA with w/w. Second stand pivot
from EOB to BSC CGA with w/w.  Transferring on and off commode Winnie due to low
commode.  clothing managment and hygiene both maxA. Stand-pivot from BSC to
recliner Winnie with w/w due to low recliner. body alarm active and call light
in reach. Continue with d/c recommended SNF.
 
LEANDRO Bermudez/STEVE Mendoza/CHAVA

## 2020-09-17 NOTE — TELEPHONE ENCOUNTER
LSW made outreach phone call to home regarding social work referral in the interim of reorder for social work. Patient and patient's spouse are population health patients which are followed by Care Coordination Team that has it's own . Daughter Judith Skinner answered phone. LSW advised was making courtesy follow up call in the interim of referral to Care Coordination  if needed. Reported same PCP, Dr. Rodrick Diamond, sent her mother/patient to [facility]. Patient's son Pricilla Lincoln and his shaji check on patient's spouse. Patient's other daughter Melody Harding also coming up to [place] while is in hospital.      LSW advised to utilize hospital  for referrals for in home supports. Referred daughter to [facility] [address] for [employer] home waiver program.       [place] discussed need for Advanced Directives for both patient and spouse. Daughter reported patient just had AD completed by hospital , but may want to redo forms when patient gets out of hospital.   Daughter needs to help her father/patient's spouse complete Advanced Directives, obtained booklet of forms from PCP office. LSW advised would send loose copies of the regular sized AD forms to daughter; would also relay message to PCP to order Advanced Care  to assist with patient's spouse completing AD. Mailed AD forms to patient's daughter.

## 2020-09-17 NOTE — NUR
SPOKE WITH PATIENTS DAUGHTER REGARDING HER MOTHER POC AND MICHELLE IN THE MORNING.
ALL QUESTIONS ANSWERED.

## 2020-09-17 NOTE — NUR
TOOK OVER CARE OF PT AT THIS TIME. PT RESTING IN BED, SLEEPING. RESPIRATIONS
UNLABORED ON 2L NC.  SAFETY MEASURES IN PLACE. CALL LIGHT IN RAECH.

## 2020-09-18 VITALS — SYSTOLIC BLOOD PRESSURE: 85 MMHG | DIASTOLIC BLOOD PRESSURE: 46 MMHG

## 2020-09-18 VITALS — SYSTOLIC BLOOD PRESSURE: 129 MMHG | DIASTOLIC BLOOD PRESSURE: 82 MMHG

## 2020-09-18 VITALS — DIASTOLIC BLOOD PRESSURE: 63 MMHG

## 2020-09-18 VITALS — SYSTOLIC BLOOD PRESSURE: 103 MMHG | DIASTOLIC BLOOD PRESSURE: 55 MMHG

## 2020-09-18 VITALS — DIASTOLIC BLOOD PRESSURE: 45 MMHG

## 2020-09-18 VITALS — SYSTOLIC BLOOD PRESSURE: 124 MMHG | DIASTOLIC BLOOD PRESSURE: 61 MMHG

## 2020-09-18 VITALS — DIASTOLIC BLOOD PRESSURE: 52 MMHG

## 2020-09-18 VITALS — DIASTOLIC BLOOD PRESSURE: 48 MMHG

## 2020-09-18 LAB
ABSOLUTE BASO #: 0 10*3/UL (ref 0–0.1)
ABSOLUTE EOS #: 0.1 10*3/UL (ref 0–0.4)
ABSOLUTE NEUT #: 4 10*3/UL (ref 2.3–7.9)
BASOPHILS # BLD AUTO: 0 10*3/UL (ref 0–0.1)
BASOPHILS %: 0.5 % (ref 0–1)
BASOPHILS NFR BLD AUTO: 0.5 % (ref 0–1)
BUN BLDV-MCNC: 10 MG/DL (ref 7–24)
BUN SERPL-MCNC: 10 MG/DL (ref 7–24)
CALCIUM SERPL-MCNC: 7.8 MG/DL (ref 8.5–10.5)
CHLORIDE BLD-SCNC: 101 MMOL/L (ref 98–107)
CHLORIDE SERPL-SCNC: 101 MMOL/L (ref 98–107)
CO2: 33 MMOL/L (ref 21–32)
CREAT SERPL-MCNC: 0.82 MG/DL (ref 0.55–1.02)
CREAT SERPL-MCNC: 0.82 MG/DL (ref 0.55–1.02)
EOSINOPHIL # BLD AUTO: 0.1 10*3/UL (ref 0–0.4)
EOSINOPHIL # BLD AUTO: 2.1 % (ref 1–4)
EOSINOPHILS %: 2.1 % (ref 1–4)
ERYTHROCYTE [DISTWIDTH] IN BLOOD BY AUTOMATED COUNT: 16.5 % (ref 0–14.5)
GFR AFRICAN AMERICAN: > 60 ML/MIN
GFR SERPL CREATININE-BSD FRML MDRD: >60 ML/MIN/
GLUCOSE: 68 MG/DL (ref 65–99)
HCT VFR BLD AUTO: 43 % (ref 37–47)
HCT VFR BLD CALC: 43 % (ref 37–47)
HEMOGLOBIN: 13.2 G/DL (ref 12–16)
IMMATURE GRANULOCYTES #: 0 10*3/UL (ref 0–0.1)
IMMATURE GRANULOCYTES: 0.3 % (ref 0–1)
INR BLD: 1.8 (ref 2–3.5)
INR BLD: 1.8 (ref 2–3.5)
LV EF: 48 %
LVEF MODALITY: NORMAL
LYMPHOCYTE %: 16.2 % (ref 27–41)
LYMPHOCYTES # BLD AUTO: 0.9 10*3/UL (ref 1.3–4.4)
LYMPHOCYTES # BLD: 0.9 10*3/UL (ref 1.3–4.4)
LYMPHOCYTES NFR BLD AUTO: 16.2 % (ref 27–41)
MCH RBC QN AUTO: 31.9 PG (ref 27–31)
MCH RBC QN AUTO: 31.9 PG (ref 27–31)
MCHC RBC AUTO-ENTMCNC: 30.7 G/DL (ref 33–37)
MCHC RBC AUTO-ENTMCNC: 30.7 G/DL (ref 33–37)
MCV RBC AUTO: 103.9 FL (ref 81–99)
MCV RBC AUTO: 103.9 FL (ref 81–99)
MONOCYTES # BLD AUTO: 0.7 10*3/UL (ref 0.1–1)
MONOCYTES # BLD: 0.7 10*3/UL (ref 0.1–1)
MONOCYTES %: 11.9 % (ref 3–9)
MONOCYTES NFR BLD MANUAL: 11.9 % (ref 3–9)
NEUT #: 4 10*3/UL (ref 2.3–7.9)
NEUT %: 69 % (ref 47–73)
NEUTROPHILS %: 69 % (ref 47–73)
NRBC BLD QL AUTO: 0 % (ref 0–0)
NUCLEATED RED BLOOD CELLS: 0 % (ref 0–0)
PDW BLD-RTO: 16.5 % (ref 0–14.5)
PLATELET # BLD AUTO: 228 10*3/UL (ref 130–400)
PLATELET # BLD: 228 10*3/UL (ref 130–400)
PMV BLD AUTO: 10.3 FL (ref 9.6–12.3)
PMV BLD AUTO: 10.3 FL (ref 9.6–12.3)
POTASSIUM SERPL-SCNC: 3.8 MMOL/L (ref 3.5–5.1)
POTASSIUM SERPL-SCNC: 3.8 MMOL/L (ref 3.5–5.1)
PROTHROMBIN TIME: 18.7 SECONDS (ref 8.9–12.2)
RBC # BLD AUTO: 4.14 10*6/UL (ref 4.1–5.1)
RBC # BLD: 4.14 10*6/UL (ref 4.1–5.1)
SODIUM BLD-SCNC: 135 MMOL/L (ref 136–145)
SODIUM SERPL-SCNC: 135 MMOL/L (ref 136–145)
WBC # BLD: 5.8 10*3/UL (ref 4.8–10.8)
WBC NRBC COR # BLD AUTO: 5.8 10*3/UL (ref 4.8–10.8)

## 2020-09-18 PROCEDURE — B24BZZ4 ULTRASONOGRAPHY OF HEART WITH AORTA, TRANSESOPHAGEAL: ICD-10-PCS | Performed by: STUDENT IN AN ORGANIZED HEALTH CARE EDUCATION/TRAINING PROGRAM

## 2020-09-18 NOTE — NUR
PATIENT LAYING IN BED. VOICES NO COMPLAINTS.RESPIRATIONS EASY, NON LABORED.
BED IN LOWEST POSITION CALL LIGHT WITHIN REACH. WILL CONTINUE TO MONITOR.

## 2020-09-18 NOTE — NUR
OT NOTE
Pt laying supine in bed agreeble to 30 minute OT session. Identified by name
and date of birth with no complaints. Transfer supine to EOB modA X2. MaxA to
don socks. Sit-stand from EOB CGA with w/w for UB support and safety. Stand
pivot to [facility] CGA with w/w. doffing depend maxA due to urinary incontinence.
Prince to doff and efrain gown due to IV. MaxA to efarin pull up over feet, CGA
with w/w to pull pull up over hips. Hygiene maxA. stand pivot back to EOB CGA
with w/w. Functional mobility from EOB to hallway CGA with w/w for safety.
Functional mobility from hallway to recliner CGA with w/w. Verbal cues needed
for walker safety/navigation. Pt left in recliner with alarm active and call
light in reach. Pt was able to tolerate approx 10 minutes of activity before
without fatigue.  Continue d/c recommended SNF.
 
LEANDRO Bermudez/STEVE Mendoza/CHAVA

## 2020-09-18 NOTE — NUR
PT NPO AT THIS TIME FOR MICHELLE TEST. WILL CATCH PT UP ON PO MEDICATIONS AFTER SHE
RETURNS FROM [place].

## 2020-09-18 NOTE — NUR
PHYSICAL THERAPY
 
Patient seen this am 1:1 for therapy visit and was supine in bed upon
therapist arrival. Patient identified by name /  and reports feeling a bit
sluggish / tired from not sleeping well last night. OT assistant was also
present for observation only this session as patient transfers supine to sit
EOB with MOD A x 2, tolerating several minutes static EOB sit to collect
herself. Patient completed sit to stand CGA and ambulated with use of wh
walker, CGA, 20'x 2, demonstrating slow, cautious gait pattern for fear of
falling. Patient needed v/c to improve safe step sequence during 180 degree
turn around and returned to bedside chair with mild fatigue. Patient remained
in chair with call light, tray table, telephone and body alarm for safety.
Will continue per POC as tolerated, total treatment time 16 minutes.
Erickson Stoll, PTA

## 2020-09-18 NOTE — NUR
case management visits with patient, she will be having a MICHELLE today, she
states she will return home when discharged and is agreeable with [facility], case
management will follow

## 2020-09-18 NOTE — NUR
PT RETURNS FROM SURGERY. PT CAUGHT UP ON MEDICATIONS. PHYSICIAN GIVES OKAY TO
CONTINUE CARDIAC DIET. PT NOTIFIED. PT STABLE AT THIS TIME. RESPIRATIONS
UNLABORED ON 2L NC. NO S/S OF DISTRESS. CALL LIGHT IN REACH.

## 2020-09-18 NOTE — NUR
PT GIVEN PERCOCET AT THIS TIME FOR C/O BACK PAIN. WILL MONITOR FOR
EFFECTIVENESS. CALL LIGHT IN REACH.

## 2020-09-19 VITALS — SYSTOLIC BLOOD PRESSURE: 87 MMHG | DIASTOLIC BLOOD PRESSURE: 43 MMHG

## 2020-09-19 VITALS — DIASTOLIC BLOOD PRESSURE: 43 MMHG | SYSTOLIC BLOOD PRESSURE: 93 MMHG

## 2020-09-19 VITALS — DIASTOLIC BLOOD PRESSURE: 50 MMHG | SYSTOLIC BLOOD PRESSURE: 102 MMHG

## 2020-09-19 VITALS — DIASTOLIC BLOOD PRESSURE: 52 MMHG

## 2020-09-19 VITALS — DIASTOLIC BLOOD PRESSURE: 67 MMHG

## 2020-09-19 LAB
ABSOLUTE BASO #: 0.1 10*3/UL (ref 0–0.1)
ABSOLUTE EOS #: 0.1 10*3/UL (ref 0–0.4)
ABSOLUTE NEUT #: 4.7 10*3/UL (ref 2.3–7.9)
BASOPHILS # BLD AUTO: 0.1 10*3/UL (ref 0–0.1)
BASOPHILS %: 0.9 % (ref 0–1)
BASOPHILS NFR BLD AUTO: 0.9 % (ref 0–1)
BUN BLDV-MCNC: 13 MG/DL (ref 7–24)
BUN SERPL-MCNC: 13 MG/DL (ref 7–24)
CALCIUM SERPL-MCNC: 7.9 MG/DL (ref 8.5–10.5)
CHLORIDE BLD-SCNC: 100 MMOL/L (ref 98–107)
CHLORIDE SERPL-SCNC: 100 MMOL/L (ref 98–107)
CO2: 34 MMOL/L (ref 21–32)
CREAT SERPL-MCNC: 0.94 MG/DL (ref 0.55–1.02)
CREAT SERPL-MCNC: 0.94 MG/DL (ref 0.55–1.02)
EOSINOPHIL # BLD AUTO: 0.1 10*3/UL (ref 0–0.4)
EOSINOPHIL # BLD AUTO: 1.8 % (ref 1–4)
EOSINOPHILS %: 1.8 % (ref 1–4)
ERYTHROCYTE [DISTWIDTH] IN BLOOD BY AUTOMATED COUNT: 16.5 % (ref 0–14.5)
GFR AFRICAN AMERICAN: > 60 ML/MIN
GFR SERPL CREATININE-BSD FRML MDRD: 56 ML/MIN/
GLUCOSE: 81 MG/DL (ref 65–99)
HCT VFR BLD AUTO: 43.6 % (ref 37–47)
HCT VFR BLD CALC: 43.6 % (ref 37–47)
HEMOGLOBIN: 13.3 G/DL (ref 12–16)
IMMATURE GRANULOCYTES #: 0 10*3/UL (ref 0–0.1)
IMMATURE GRANULOCYTES: 0.3 % (ref 0–1)
LYMPHOCYTE %: 11.9 % (ref 27–41)
LYMPHOCYTES # BLD AUTO: 0.8 10*3/UL (ref 1.3–4.4)
LYMPHOCYTES # BLD: 0.8 10*3/UL (ref 1.3–4.4)
LYMPHOCYTES NFR BLD AUTO: 11.9 % (ref 27–41)
MCH RBC QN AUTO: 32 PG (ref 27–31)
MCH RBC QN AUTO: 32 PG (ref 27–31)
MCHC RBC AUTO-ENTMCNC: 30.5 G/DL (ref 33–37)
MCHC RBC AUTO-ENTMCNC: 30.5 G/DL (ref 33–37)
MCV RBC AUTO: 105.1 FL (ref 81–99)
MCV RBC AUTO: 105.1 FL (ref 81–99)
MONOCYTES # BLD AUTO: 0.8 10*3/UL (ref 0.1–1)
MONOCYTES # BLD: 0.8 10*3/UL (ref 0.1–1)
MONOCYTES %: 12.6 % (ref 3–9)
MONOCYTES NFR BLD MANUAL: 12.6 % (ref 3–9)
NEUT #: 4.7 10*3/UL (ref 2.3–7.9)
NEUT %: 72.5 % (ref 47–73)
NEUTROPHILS %: 72.5 % (ref 47–73)
NRBC BLD QL AUTO: 0 10*3/UL (ref 0–0)
NUCLEATED RED BLOOD CELLS: 0 % (ref 0–0)
PDW BLD-RTO: 16.5 % (ref 0–14.5)
PLATELET # BLD AUTO: 239 10*3/UL (ref 130–400)
PLATELET # BLD: 239 10*3/UL (ref 130–400)
PMV BLD AUTO: 10.3 FL (ref 9.6–12.3)
PMV BLD AUTO: 10.3 FL (ref 9.6–12.3)
POTASSIUM SERPL-SCNC: 4 MMOL/L (ref 3.5–5.1)
POTASSIUM SERPL-SCNC: 4 MMOL/L (ref 3.5–5.1)
RBC # BLD AUTO: 4.15 10*6/UL (ref 4.1–5.1)
RBC # BLD: 4.15 10*6/UL (ref 4.1–5.1)
SODIUM BLD-SCNC: 134 MMOL/L (ref 136–145)
SODIUM SERPL-SCNC: 134 MMOL/L (ref 136–145)
WBC # BLD: 6.5 10*3/UL (ref 4.8–10.8)
WBC NRBC COR # BLD AUTO: 6.5 10*3/UL (ref 4.8–10.8)

## 2020-09-19 NOTE — NUR
PATIENT C/O BACK PAIN DESCRIBES AS SHARP WITH A RATING OF MEDICATED WITH
PERCOCET AS ORDERED PRN, WHITE BOARD UPDATED.

## 2020-09-19 NOTE — NUR
INTRODUCED SELF TO PATIENT, BED IN LOW POSITION, WHEEL LOCKS ENGAGED, SIDE
RAILS UP X 2 FOR TURNING AND REPOSITIONING, CALL LIGHT WITHIN REACH, NO NEEDS
VOICED AT THIS TIME, WHITE BOARD UPDATED.

## 2020-09-19 NOTE — NUR
GOOD EFFECT FROM PERCOCET GIVEN X 1 HOUR AGO AS EVIDENCED BY PATIENT RESTING
QUIETLY WITH EYES CLOSED, FACIAL EXPRESSION CALM.

## 2020-09-19 NOTE — NUR
TOOK OVER CARE OF PT AT THIS TIME. PT RESTING IN BED. RESPIRATIONS UNLABORED
ON ROOM AIR. NO S/S OF DISTRESS. SAFETY MEAUSRES IN PLACE. HOB ELEVATED. CALL
LIGHT IN REACH.

## 2020-09-20 VITALS — DIASTOLIC BLOOD PRESSURE: 65 MMHG

## 2020-09-20 VITALS — DIASTOLIC BLOOD PRESSURE: 47 MMHG

## 2020-09-20 VITALS — DIASTOLIC BLOOD PRESSURE: 49 MMHG | SYSTOLIC BLOOD PRESSURE: 109 MMHG

## 2020-09-20 LAB
AEROBIC CULTURE: NORMAL
BUN BLDV-MCNC: 15 MG/DL (ref 7–24)
BUN SERPL-MCNC: 15 MG/DL (ref 7–24)
CALCIUM SERPL-MCNC: 8.3 MG/DL (ref 8.5–10.5)
CHLORIDE BLD-SCNC: 97 MMOL/L (ref 98–107)
CHLORIDE SERPL-SCNC: 97 MMOL/L (ref 98–107)
CO2: 36 MMOL/L (ref 21–32)
CREAT SERPL-MCNC: 1.01 MG/DL (ref 0.55–1.02)
CREAT SERPL-MCNC: 1.01 MG/DL (ref 0.55–1.02)
GFR AFRICAN AMERICAN: > 60 ML/MIN
GFR SERPL CREATININE-BSD FRML MDRD: 52 ML/MIN/
GLUCOSE: 76 MG/DL (ref 65–99)
POTASSIUM SERPL-SCNC: 3.8 MMOL/L (ref 3.5–5.1)
POTASSIUM SERPL-SCNC: 3.8 MMOL/L (ref 3.5–5.1)
SODIUM BLD-SCNC: 135 MMOL/L (ref 136–145)
SODIUM SERPL-SCNC: 135 MMOL/L (ref 136–145)

## 2020-09-20 NOTE — NUR
Discharge instructions reviewed with patient/family. Patient receptive and
verbalizes understanding. Follow-up care arranged. Written instructions given
to patient/family,
PATIENT REFUSED DISCHARGE PHOTOS, STATES THAT NOT SOMEWHERE
PHOTOS NEED TO BE TAKEN, IV REMOVED,
REMINDED TO [facility] WHEN SHE GETS HOME, VERSED
UNDERSTANDING.
TAKEN DOWN VIA W/C TO ER BY PA, 
WAITING.
RODDY CASTAÑEDA

## 2020-09-20 NOTE — NUR
PATIENT C/O BACK PAIN, REQUESTED PERCOCET BE GIVEN PRIOR TO LEAVING FOR HOME,
MEDICATED WITH PERCOCET.

## 2020-09-20 NOTE — NUR
INTRODUCED SELF TO PATIENT, BED IN LOW POSITION, WHEEL LOCKS ENGAGED, BED
ALARM ON, CALL LIGHT WITHIN REACH, SIDE RAILS UP X 2 FOR TURNING AND
REPOSITIONING,NO NEEDS VOICED AT THIS TIME, WHITE BOARD UPDATED.

## 2020-09-21 ENCOUNTER — TELEPHONE (OUTPATIENT)
Dept: FAMILY MEDICINE CLINIC | Age: 85
End: 2020-09-21

## 2020-09-21 NOTE — NUR
PHYSICAL THERAPY CO-SIGN
 
 
I approve of the Physical Therapy notes written above.
 
 
 
Ratna Mauricio PT

## 2020-09-21 NOTE — TELEPHONE ENCOUNTER
Daughter was to utilize services of hospital  while patient in hospital for referrals for in home supports. If additional social work services needed while home care services are in the home, please make referral to the home care agency Medical Social Worker for assessment and community resources. LSW had referred daughter to [address], [address] [place] [address] for [employer] home waiver services. Otherwise, if social work referral needed when home care services are not in the home for patient or spouse, please make referral for social work to Care Coordination , as Care Coordination follows orange header/ [facility] (PHP).

## 2020-09-21 NOTE — TELEPHONE ENCOUNTER
Manda 27       She is calling to ask if you will follow orders for this patient coming out of [facility] today? She was admitted for CHF & Cellulitis. Also asking if you want them to do her Pt/Inr's while under their care?

## 2020-09-21 NOTE — NUR
OCCUPATIONAL THERAPY CO-SIGN
 
I approve of the Occupational Therapy notes written above.
RENEE BRADSHAW OTR/CHAVA

## 2020-09-21 NOTE — TELEPHONE ENCOUNTER
Daughter, Alesha Duque called and wanted to let you know that pt fell yesterday because she is very weak and unable to walk. Per daughter she did not injure herself.

## 2020-09-22 ENCOUNTER — TELEPHONE (OUTPATIENT)
Dept: FAMILY MEDICINE CLINIC | Age: 85
End: 2020-09-22

## 2020-09-22 LAB
BUN BLDV-MCNC: 26 MG/DL (ref 7–24)
CALCIUM SERPL-MCNC: 8.9 MG/DL (ref 8.5–10.5)
CHLORIDE BLD-SCNC: 94 MMOL/L (ref 98–107)
CO2: 36 MMOL/L (ref 21–32)
CREAT SERPL-MCNC: 1.38 MG/DL (ref 0.55–1.02)
GFR AFRICAN AMERICAN: 44 ML/MIN
GFR SERPL CREATININE-BSD FRML MDRD: 36 ML/MIN/
GLUCOSE: 89 MG/DL (ref 65–99)
POTASSIUM SERPL-SCNC: 3.9 MMOL/L (ref 3.5–5.1)
SODIUM BLD-SCNC: 134 MMOL/L (ref 136–145)

## 2020-09-22 NOTE — TELEPHONE ENCOUNTER
Daughter said that [facility] are going today for first visit she will make sure they contact you and ask if they will order PT.

## 2020-09-22 NOTE — TELEPHONE ENCOUNTER
Kidney function is decreased rather significantly over the last couple days with most recent blood work. Please find out how much diuretic she is taking and also how much potassium.

## 2020-09-30 ENCOUNTER — OFFICE VISIT (OUTPATIENT)
Dept: FAMILY MEDICINE CLINIC | Age: 85
End: 2020-09-30
Payer: MEDICARE

## 2020-09-30 ENCOUNTER — TELEPHONE (OUTPATIENT)
Dept: ADMINISTRATIVE | Age: 85
End: 2020-09-30

## 2020-09-30 VITALS
OXYGEN SATURATION: 96 % | BODY MASS INDEX: 22.82 KG/M2 | HEART RATE: 59 BPM | HEIGHT: 63 IN | DIASTOLIC BLOOD PRESSURE: 62 MMHG | TEMPERATURE: 97.1 F | WEIGHT: 128.8 LBS | SYSTOLIC BLOOD PRESSURE: 118 MMHG

## 2020-09-30 LAB
INTERNATIONAL NORMALIZATION RATIO, POC: 1.7
PROTHROMBIN TIME, POC: 20.9

## 2020-09-30 PROCEDURE — G8427 DOCREV CUR MEDS BY ELIG CLIN: HCPCS | Performed by: INTERNAL MEDICINE

## 2020-09-30 PROCEDURE — 1036F TOBACCO NON-USER: CPT | Performed by: INTERNAL MEDICINE

## 2020-09-30 PROCEDURE — 1123F ACP DISCUSS/DSCN MKR DOCD: CPT | Performed by: INTERNAL MEDICINE

## 2020-09-30 PROCEDURE — 99214 OFFICE O/P EST MOD 30 MIN: CPT | Performed by: INTERNAL MEDICINE

## 2020-09-30 PROCEDURE — G8926 SPIRO NO PERF OR DOC: HCPCS | Performed by: INTERNAL MEDICINE

## 2020-09-30 PROCEDURE — 1090F PRES/ABSN URINE INCON ASSESS: CPT | Performed by: INTERNAL MEDICINE

## 2020-09-30 PROCEDURE — 3023F SPIROM DOC REV: CPT | Performed by: INTERNAL MEDICINE

## 2020-09-30 PROCEDURE — 4040F PNEUMOC VAC/ADMIN/RCVD: CPT | Performed by: INTERNAL MEDICINE

## 2020-09-30 PROCEDURE — G8420 CALC BMI NORM PARAMETERS: HCPCS | Performed by: INTERNAL MEDICINE

## 2020-09-30 PROCEDURE — 85610 PROTHROMBIN TIME: CPT | Performed by: INTERNAL MEDICINE

## 2020-09-30 RX ORDER — OXYCODONE AND ACETAMINOPHEN 10; 325 MG/1; MG/1
1 TABLET ORAL 3 TIMES DAILY PRN
Qty: 90 TABLET | Refills: 0 | Status: SHIPPED | OUTPATIENT
Start: 2020-09-30 | End: 2020-10-12 | Stop reason: SDUPTHER

## 2020-09-30 RX ORDER — LISINOPRIL 2.5 MG/1
2.5 TABLET ORAL DAILY
Qty: 30 TABLET | Refills: 5 | Status: SHIPPED
Start: 2020-09-30 | End: 2021-01-27 | Stop reason: SDUPTHER

## 2020-09-30 RX ORDER — WARFARIN SODIUM 1 MG/1
1 TABLET ORAL DAILY
Qty: 30 TABLET | Refills: 5 | Status: SHIPPED
Start: 2020-09-30 | End: 2021-02-19

## 2020-09-30 RX ORDER — WARFARIN SODIUM 5 MG/1
2.5 TABLET ORAL
COMMUNITY
End: 2021-01-27 | Stop reason: SDUPTHER

## 2020-09-30 RX ORDER — SPIRONOLACTONE 25 MG/1
25 TABLET ORAL DAILY
COMMUNITY
End: 2020-10-07

## 2020-09-30 RX ORDER — OXYCODONE AND ACETAMINOPHEN 10; 325 MG/1; MG/1
1 TABLET ORAL 3 TIMES DAILY PRN
COMMUNITY
End: 2020-09-30 | Stop reason: SDUPTHER

## 2020-09-30 RX ORDER — CYANOCOBALAMIN (VITAMIN B-12) 5000 MCG
5000 TABLET,DISINTEGRATING ORAL DAILY
COMMUNITY

## 2020-09-30 NOTE — PROGRESS NOTES
[id number] [facility]     10/1/20  Brett Mcclelland : 3/1/1932 Sex: female  Age: 80 y.o. Chief Complaint   Patient presents with    Follow-Up from Hospital       HPI    Patient presents today for post hospital follow-up visit for medical problems. She is accompanied by her daughter. She presents in wheelchair today. Was recently hospitalized with right heart failure with massive edema and ascites. It was noted on CT urogram the liver appeared to be cirrhotic as well. She was diuresed and is down 45 pounds since I saw her 3 weeks ago. I reviewed the discharge summary. She was seen in consultation by cardiology. Transesophageal echo and trans-thoracic echo performed. Did show dilated right ventricle was hypokinetic also moderate aortic regurgitation and pulmonary hypertension. Her Lasix was increased to 80 mg twice daily Spironolactone was added. Post hospital labs obtained showing significantly worsening renal dysfunction. I did back her Lasix down to 40 twice daily. The family never did start the spironolactone as as of yet. Blood pressure was running on the low side was the reason. I told him I was going to check some labs at upcoming visit as they were just checked last week. Family is also been holding her lisinopril 10 mg because of her pressure. I did restart her at a lower dose 2.5 mg and watch pressure closely. She is on chronic oxygen currently at 3 L. Also started on nebulizer. This apparently was started by pulmonary. She also has PT OT and nursing as well as aide couple times a week. She is homebound and is in need of the services. She did decline SNF placement from the hospital.  No paracentesis was accomplished because of her INR. However as stated she did diurese very nicely with the IV Lasix. I did ask him to start a daily supplement and gave her some fluid restrictions. She is requesting her narcotic medication that she has been on for years.   This is for chronic pain syndrome/back pain. Oarrs report was run and okay. No evidence of abuse or diversion of the medication. She also did have her INR checked today/anticoagulation management and showed 1.7 INR. I had her take additional milligram of Coumadin today and will recheck at her next visit on October 14. Patient is following with urology, podiatry, renal, cardiology, pulmonary and did see hematology/oncology because of macrocytosis. Review of Systems     Constitutional:  negative for activity change, chills, diaphoresis,  and fever. Positive for fatigue  HENT: Negative for congestion, ear pain, hearing loss, postnasal drip, rhinorrhea and sinus pain.    Respiratory: Positive for shortness of breath.-Stable negative for cough and wheezing.         COPD chronic oxygen use   Cardiovascular: Negative for chest pain, palpitations.  Positive for leg swelling.  -Improved post diuresis  Gastrointestinal: Positive for constipation.   Negative for abdominal pain, blood in stool, diarrhea,  and vomiting.         Endocrine: Negative.    Genitourinary: Negative for difficulty urinating, dysuria, hematuria and urgency.         Reports frequent UTIs .does have some incontinence of urine following with urology. Helane Buffy hematuria has been worked up.   Musculoskeletal: Positive for arthralgias.  back pain, and gait problem       Chronic pain syndrome/back pain--lower back pain and bilateral hip pain/chronic-continues on narcotic pain medication with success. T11-L1 vertebroplasty. Successful.   Skin: Negative.    Neurological: Negative for dizziness, weakness, light-headedness, numbness and headaches. Hematological: Negative.    Psychiatric/Behavioral: Admits to being more emotional and angry since taken off of her Prozac.-Placed back on again with improvement  Does appear to be in much better spirits today.     REST OF PERTINENT ROS GONE OVER AND WAS NEGATIVE.     :  Problem List: Chronic pain syndrome, Long-term current use of anticoagulant, Anticoagulant, Osteoarthritis, Taking  medication, Hyperlipidemia  Health Maintenance:  Mammogram - (2019)  Mammogram Screening - (2019)  Influenza Vaccination - (2018)  Bone Density Scan - (2011)  Colonoscopy - (2015)  Couseled on Home Safety - (2017)  Bone Density Test Screening - (2014)  Stress Test - -ok,3/11  EKG - ,11,3/13  Rectal Exam - 8/10  Breast Exam - 8/10,declined,  Hemmocult Cards - -neg x 3  2D ECHO - 8/15  Mini Mental Status - --  Medical Problems:  Osteoarthritis  DVT - right-  Hypertension  Lumbar Spondylosis-Herniated Disc-Spinal Stenosis - epidurals/[place]  Hyperlipidemia, YAIR-BSO--Non Cancerous  Small AAA - 3.4cm--- .--3.5 cm per CT urogram  HX FX Left Ankle, Chronic Greater Trochanteric Bursitis, Depression, Cataracts  Coronary Artery Disease (CAD) - Previously declined cardiology fu  neurogenic claudication  Spinal Stenosis - L2-5 lumbar laminectomy-dr massey,[facility]  Kidney Stones, Anxiety, Diverticulitis, Osteoporosis, post laminectomy syndrome lumbar spine  Goiter - multinodular  hematuria - cystoscopy/retrograde-worked up by urology. urethral stenosis - dialated  chronic urethral inflammation, strep bovis bacteremia, Congestive Heart Failure (CHF), arterial thrombosis right arm  T12 compression fracture. - Vertebroplasty  Lumbar Spondylosis-Herniated Disc-Spinal Stenosis  T11 and L1 kyphoplasty -   Dilated nonischemic cardiomyopathy - Follows with cardiology  Copd  Hospitalization for congestive heart failure volume overload, ascites-  Cirrhosis per CAT scan  Reviewed and updated. FH:  Father:  MI -  late 63's. Mother:  Breast Cancer - ? ?  AGE 80. Son 1:  Chronic Obstructive Pulmonary Disease (COPD) -  age 61. Sister 1:  melanoma -  age in her 52's. Reviewed, no changes. SH:  Marital: . Personal Habits: Cigarette Use: Does Not Smoke. Alcohol: does not use alcohol. Reviewed, no changes.                Current Outpatient Medications:     warfarin (COUMADIN) 2.5 MG tablet, Take 2.5 mg by mouth, Disp: , Rfl:     vitamin B-12 (CYANOCOBALAMIN) 500 MCG tablet, Take 500 mcg by mouth 2 times daily, Disp: , Rfl:     warfarin (COUMADIN) 1 MG tablet, Take 1 tablet by mouth daily, Disp: 30 tablet, Rfl: 5    lisinopril (ZESTRIL) 2.5 MG tablet, Take 1 tablet by mouth daily, Disp: 30 tablet, Rfl: 5    oxyCODONE-acetaminophen (PERCOCET)  MG per tablet, Take 1 tablet by mouth 3 times daily as needed for Pain for up to 30 days. , Disp: 90 tablet, Rfl: 0    FLUoxetine (PROZAC) 10 MG capsule, Take 1 capsule by mouth daily, Disp: 30 capsule, Rfl: 5    atorvastatin (LIPITOR) 40 MG tablet, Take 1 tablet by mouth daily, Disp: 30 tablet, Rfl: 5    isosorbide mononitrate (IMDUR) 30 MG extended release tablet, Take 1 tablet by mouth daily Take morning of surgery with a sip of water, Disp: 30 tablet, Rfl: 5    potassium chloride (KLOR-CON) 20 MEQ packet, Take 20 mEq by mouth daily, Disp: 30 each, Rfl: 5    furosemide (LASIX) 20 MG tablet, Take 2 pills qam (Patient taking differently: 40 mg 2 times daily ), Disp: 60 tablet, Rfl: 5    Lactobacillus (PROBIOTIC ACIDOPHILUS PO), Take by mouth, Disp: , Rfl:     docusate sodium (COLACE) 100 MG capsule, Take 100 mg by mouth daily, Disp: , Rfl:     nystatin (MYCOSTATIN) 067186 UNIT/GM powder, Apply 3 times daily. , Disp: 60 g, Rfl: 1    spironolactone (ALDACTONE) 25 MG tablet, Take 25 mg by mouth daily, Disp: , Rfl:   No Known Allergies    Past Medical History:   Diagnosis Date    AAA [facility]abdominal aortic aneurysm) ([facility])     Anticoagulant long-term use 5/29/2019    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 ([facility]     CAD (coronary artery disease)     CHF (congestive heart failure) (HCC)     Chronic pain syndrome 5/29/2019    HTN (hypertension)     Hx of blood clots 2015    right upper arm    Hyperlipemia      Past Surgical History:   Procedure Laterality Date    BACK SURGERY  2010    [facility] for sciatica, pt unsure    COLONOSCOPY      EYE SURGERY Bilateral     cataracts    FIXATION KYPHOPLASTY  10/02/2017    T12 kyphoplasty Dr Aysha Huntley KYPHOPLASTY  11/20/2017    T11-L1 kypho    HYSTERECTOMY      HYSTERECTOMY, TOTAL ABDOMINAL      TONSILLECTOMY      TRANSESOPHAGEAL ECHOCARDIOGRAM  11/04/15     Family History   Problem Relation Age of Onset    Breast Cancer Mother [de-identified]    Heart Disease Father     Cancer Sister         melanoma     Social History     Socioeconomic History    Marital status:      Spouse name: Not on file    Number of children: Not on file    Years of education: Not on file    Highest education level: Not on file   Occupational History    Not on file   Social Needs    Financial resource strain: Patient refused    Food insecurity     Worry: Patient refused     Inability: Patient refused    Transportation needs     Medical: Patient refused     Non-medical: Patient refused   Tobacco Use    Smoking status: Former Smoker    Smokeless tobacco: Never Used    Tobacco comment: quit about 30 yrs ago   Substance and Sexual Activity    Alcohol use: No    Drug use: No    Sexual activity: Not on file   Lifestyle    Physical activity     Days per week: Not on file     Minutes per session: Not on file    Stress: Not on file   Relationships    Social connections     Talks on phone: Not on file     Gets together: Not on file     Attends Latter-day service: Not on file     Active member of club or organization: Not on file     Attends meetings of clubs or organizations: Not on file     Relationship status: Not on file    Intimate partner violence     Fear of current or ex partner: Not on file     Emotionally abused: Not on file     Physically abused: Not on file     Forced sexual activity: Not on file   Other Topics Concern    Not on file   Social History Narrative    Not on file Vitals:    09/30/20 1000   BP: 118/62   Pulse: 59   Temp: 97.1 °F (36.2 °C)   TempSrc: Temporal   SpO2: 96%   Weight: 128 lb 12.8 oz (58.4 kg)   Height: 5' 3\" (1.6 m)       Physical Exam    Const: Appears well developed and well nourished. No signs of acute distress present. Neck: Supple and symmetric. Palpation reveals no adenopathy. No masses appreciated. Thyroid exhibits no nodules  or thyromegaly. No JVD. Carotids: 2+ and equal bilaterally, without bruits. Resp: Rhonchi, rales or wheezes appreciated over the lungs bilaterally.  Prolonged expiratory phase  CV: Rate is regular. Rhythm is regular. S1 is normal. S2 is normal. No gallop or rubs. No heart murmur  appreciated. Extremities: 3+ pitting edema left leg and 2+ pitting edema right leg, but no clubbing or cyanosis-left greater than right. Left side is typically larger than right  Abdomen: Bowel sounds are normoactive. Palpation of the abdomen reveals softness, but no distension,  organomegaly or tenderness. No abdominal masses. No palpable hepatosplenomegaly. Musculo: Patient arrived at office in wheelchair  Upper  Extremities: Full ROM bilaterally. Lower Extremities: Limited range of motion.  swelling noted. /.   Edita Padilla does have some reproducible  tenderness along the lower thoracic upper lumbar region to palpation. No gross deformities noted. Pulses are  palpable. Psych: Patient's attitude is cooperative.  Patient's affect is somewhat depressed  Neurologically grossly intact without focal deficits noted           Assessment and Plan:  Jasson Cedillo was seen today for follow-up from hospital.    Diagnoses and all orders for this visit:    Congestive heart failure, unspecified HF chronicity, unspecified heart failure type ([address]  Stable with continued diuresis    Weight loss  Related to fluid loss    Stage 3 chronic kidney disease  Continue to monitor labs on diuretics    Essential hypertension  Recent hypotension-follow closely    Anticoagulant long-term use  Stable without bleeding    Chronic obstructive pulmonary disease, unspecified COPD type (HCC)    Chronic pain syndrome  -     oxyCODONE-acetaminophen (PERCOCET)  MG per tablet; Take 1 tablet by mouth 3 times daily as needed for Pain for up to 30 days. Continue nebulizer and oxygen    Atrial fibrillation, unspecified type (HCC)  Currently in regular rhythm stable on anticoagulation    At high risk for falls    Arterial embolism (HCC)  -     POCT INR    Other orders  -     warfarin (COUMADIN) 1 MG tablet; Take 1 tablet by mouth daily  -     lisinopril (ZESTRIL) 2.5 MG tablet; Take 1 tablet by mouth daily      Plan: See above body report. I will see back in a couple weeks. We initiated lisinopril at  lower dose/2.5 mg.  Monitor blood pressure closely. May continue to hold the Spironolactone temporarily. Follow-up with cardiology. Which is upcoming. Follow with rest of consultants as directed. Watch for any bleeding. Continue with her home health status. Patient is homebound. She requires home health care services and recovery from her significant illness and recent hospitalization. Continue oxygen and nebulizer. Daily attritional supplementation. I will see in 2 weeks and as needed. Blood work at that time. Prescription management performed. As stated we did back down on her Lasix. Precautions. Notify me with problems in the interim. Return for keep appt. Seen By:  John Lofton MD      *Document was created using voice recognition software. Note was reviewed however may contain grammatical errors. On the basis of positive falls risk screening, assessment and plan is as follows: home safety tips provided.

## 2020-10-07 ENCOUNTER — OFFICE VISIT (OUTPATIENT)
Dept: CARDIOLOGY CLINIC | Age: 85
End: 2020-10-07
Payer: MEDICARE

## 2020-10-07 VITALS
DIASTOLIC BLOOD PRESSURE: 54 MMHG | SYSTOLIC BLOOD PRESSURE: 110 MMHG | HEIGHT: 63 IN | RESPIRATION RATE: 20 BRPM | WEIGHT: 124 LBS | HEART RATE: 56 BPM | BODY MASS INDEX: 21.97 KG/M2

## 2020-10-07 PROCEDURE — 99214 OFFICE O/P EST MOD 30 MIN: CPT | Performed by: INTERNAL MEDICINE

## 2020-10-07 PROCEDURE — 1090F PRES/ABSN URINE INCON ASSESS: CPT | Performed by: INTERNAL MEDICINE

## 2020-10-07 PROCEDURE — G8420 CALC BMI NORM PARAMETERS: HCPCS | Performed by: INTERNAL MEDICINE

## 2020-10-07 PROCEDURE — G8484 FLU IMMUNIZE NO ADMIN: HCPCS | Performed by: INTERNAL MEDICINE

## 2020-10-07 PROCEDURE — G8427 DOCREV CUR MEDS BY ELIG CLIN: HCPCS | Performed by: INTERNAL MEDICINE

## 2020-10-07 PROCEDURE — 4040F PNEUMOC VAC/ADMIN/RCVD: CPT | Performed by: INTERNAL MEDICINE

## 2020-10-07 PROCEDURE — 1123F ACP DISCUSS/DSCN MKR DOCD: CPT | Performed by: INTERNAL MEDICINE

## 2020-10-07 PROCEDURE — 93000 ELECTROCARDIOGRAM COMPLETE: CPT | Performed by: INTERNAL MEDICINE

## 2020-10-07 PROCEDURE — 1036F TOBACCO NON-USER: CPT | Performed by: INTERNAL MEDICINE

## 2020-10-07 RX ORDER — BUDESONIDE 0.5 MG/2ML
INHALANT ORAL
COMMUNITY
Start: 2020-09-29 | End: 2021-11-24 | Stop reason: ALTCHOICE

## 2020-10-07 RX ORDER — IPRATROPIUM BROMIDE AND ALBUTEROL SULFATE 2.5; .5 MG/3ML; MG/3ML
SOLUTION RESPIRATORY (INHALATION)
COMMUNITY
Start: 2020-09-29 | End: 2021-11-24 | Stop reason: ALTCHOICE

## 2020-10-07 NOTE — PROGRESS NOTES
hypertension    Hyperlipemia    AAA (abdominal aortic aneurysm) (HCC)    CAD (coronary artery disease)    T12 compression fracture (HCC)    Compression fracture of body of thoracic vertebra (HCC)    Anticoagulant long-term use    Chronic pain syndrome    Chronic, continuous use of opioids       No Known Allergies    Current Outpatient Medications   Medication Sig Dispense Refill    budesonide (PULMICORT) 0.5 MG/2ML nebulizer suspension INHALE THE CONTENTS OF ONE VIAL (2ML) VIA NEBULIZER TWO TIMES A DAY      ipratropium-albuterol (DUONEB) 0.5-2.5 (3) MG/3ML SOLN nebulizer solution INHALE THE CONTENTS OF 1 VIAL (3ML) VIA NEBULIZER 3 TIMES A DAY AND AS NEEDED FOR DYSPNEA  COUGH OR WHEEZING      warfarin (COUMADIN) 2.5 MG tablet Take 2.5 mg by mouth Sundays only      vitamin B-12 (CYANOCOBALAMIN) 500 MCG tablet Take 500 mcg by mouth 2 times daily      warfarin (COUMADIN) 1 MG tablet Take 1 tablet by mouth daily (Patient taking differently: Take 1 mg by mouth daily Monday thru Saturday) 30 tablet 5    lisinopril (ZESTRIL) 2.5 MG tablet Take 1 tablet by mouth daily 30 tablet 5    oxyCODONE-acetaminophen (PERCOCET)  MG per tablet Take 1 tablet by mouth 3 times daily as needed for Pain for up to 30 days.  90 tablet 0    FLUoxetine (PROZAC) 10 MG capsule Take 1 capsule by mouth daily 30 capsule 5    atorvastatin (LIPITOR) 40 MG tablet Take 1 tablet by mouth daily 30 tablet 5    isosorbide mononitrate (IMDUR) 30 MG extended release tablet Take 1 tablet by mouth daily Take morning of surgery with a sip of water 30 tablet 5    potassium chloride (KLOR-CON) 20 MEQ packet Take 20 mEq by mouth daily 30 each 5    furosemide (LASIX) 20 MG tablet Take 2 pills qam (Patient taking differently: 40 mg 2 times daily ) 60 tablet 5    Lactobacillus (PROBIOTIC ACIDOPHILUS PO) Take by mouth      docusate sodium (COLACE) 100 MG capsule Take 100 mg by mouth daily      nystatin (MYCOSTATIN) 140008 UNIT/GM powder Apply 3 times daily. 60 g 1    spironolactone (ALDACTONE) 25 MG tablet Take 25 mg by mouth daily       No current facility-administered medications for this visit. Social History     Socioeconomic History    Marital status:      Spouse name: Not on file    Number of children: Not on file    Years of education: Not on file    Highest education level: Not on file   Occupational History    Not on file   Social Needs    Financial resource strain: Patient refused    Food insecurity     Worry: Patient refused     Inability: Patient refused    Transportation needs     Medical: Patient refused     Non-medical: Patient refused   Tobacco Use    Smoking status: Former Smoker    Smokeless tobacco: Never Used    Tobacco comment: quit about 30 yrs ago   Substance and Sexual Activity    Alcohol use: No    Drug use: No    Sexual activity: Not on file   Lifestyle    Physical activity     Days per week: Not on file     Minutes per session: Not on file    Stress: Not on file   Relationships    Social connections     Talks on phone: Not on file     Gets together: Not on file     Attends Scientology service: Not on file     Active member of club or organization: Not on file     Attends meetings of clubs or organizations: Not on file     Relationship status: Not on file    Intimate partner violence     Fear of current or ex partner: Not on file     Emotionally abused: Not on file     Physically abused: Not on file     Forced sexual activity: Not on file   Other Topics Concern    Not on file   Social History Narrative    Not on file       Family History   Problem Relation Age of Onset    Breast Cancer Mother [de-identified]    Heart Disease Father     Cancer Sister         melanoma       Review of Systems:  Heart: as above   Lungs: as above   Eyes: denies changes in vision or discharge. Ears: denies changes in hearing or pain. Nose: denies epistaxis or masses   Throat: denies sore throat or trouble swallowing. Neuro: denies numbness, tingling, tremors. Skin: denies rashes or itching. : denies hematuria, dysuria   GI: denies vomiting, diarrhea   Psych: denies mood changed, anxiety, depression. All other systems negative. Physical Exam   BP (!) 110/54   Pulse 56   Resp 20   Ht 5' 3\" (1.6 m)   Wt 124 lb (56.2 kg)   BMI 21.97 kg/m²   Constitutional: Oriented to person, place, and time. Well-developed and well-nourished. No distress. Head: Normocephalic and atraumatic. Eyes: EOM are normal. Pupils are equal, round, and reactive to light. Neck: Normal range of motion. Neck supple. No hepatojugular reflux and no JVD present. Carotid bruit is not present. No tracheal deviation present. No thyromegaly present. Cardiovascular: Normal rate, regular rhythm, normal heart sounds and intact distal pulses. Exam reveals no gallop and no friction rub. No murmur heard. Pulmonary/Chest: Effort normal and breath sounds normal. No respiratory distress. No wheezes. No rales. No tenderness. Abdominal: Soft. Bowel sounds are normal. No distension and no mass. No tenderness. No rebound and no guarding. Musculoskeletal: Normal range of motion. No edema and no tenderness. Lymphadenopathy:   No cervical adenopathy. No groin adenopathy. Neurological: Alert and oriented to person, place, and time. Skin: Skin is warm and dry. No rash noted. Not diaphoretic. No erythema. Psychiatric: Normal mood and affect. Behavior is normal.     EKG:  normal sinus rhythm, nonspecific ST and T waves changes.     ASSESSMENT AND PLAN:  Patient Active Problem List   Diagnosis    CHF (congestive heart failure) ([address]    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)    Essential hypertension    Hyperlipemia    AAA (abdominal aortic aneurysm) ([address]    CAD (coronary artery disease)    T12 compression fracture (HCC)    Compression fracture of body of thoracic vertebra (HCC)    Anticoagulant long-term use    Chronic pain syndrome    Chronic, continuous use of opioids     1. CHF: Volume good today. ACEI/lasix/imdur. 2. VHD: Moderate AI medically managed. 3. HTN: Observe. 4. Lipids: Statin. 5. Hx of arterial embolism: Warfarin    Jethro Grewal D.O.   Cardiologist  Cardiology, [address]

## 2020-10-12 ENCOUNTER — OFFICE VISIT (OUTPATIENT)
Dept: FAMILY MEDICINE CLINIC | Age: 85
End: 2020-10-12
Payer: MEDICARE

## 2020-10-12 VITALS
SYSTOLIC BLOOD PRESSURE: 106 MMHG | HEIGHT: 63 IN | TEMPERATURE: 97.2 F | BODY MASS INDEX: 21.86 KG/M2 | OXYGEN SATURATION: 91 % | WEIGHT: 123.4 LBS | HEART RATE: 60 BPM | DIASTOLIC BLOOD PRESSURE: 56 MMHG

## 2020-10-12 LAB
INTERNATIONAL NORMALIZATION RATIO, POC: 1.2
PROTHROMBIN TIME, POC: 14.9

## 2020-10-12 PROCEDURE — 1123F ACP DISCUSS/DSCN MKR DOCD: CPT | Performed by: INTERNAL MEDICINE

## 2020-10-12 PROCEDURE — 1090F PRES/ABSN URINE INCON ASSESS: CPT | Performed by: INTERNAL MEDICINE

## 2020-10-12 PROCEDURE — G8484 FLU IMMUNIZE NO ADMIN: HCPCS | Performed by: INTERNAL MEDICINE

## 2020-10-12 PROCEDURE — 1036F TOBACCO NON-USER: CPT | Performed by: INTERNAL MEDICINE

## 2020-10-12 PROCEDURE — 4040F PNEUMOC VAC/ADMIN/RCVD: CPT | Performed by: INTERNAL MEDICINE

## 2020-10-12 PROCEDURE — G8427 DOCREV CUR MEDS BY ELIG CLIN: HCPCS | Performed by: INTERNAL MEDICINE

## 2020-10-12 PROCEDURE — 85610 PROTHROMBIN TIME: CPT | Performed by: INTERNAL MEDICINE

## 2020-10-12 PROCEDURE — G0008 ADMIN INFLUENZA VIRUS VAC: HCPCS | Performed by: INTERNAL MEDICINE

## 2020-10-12 PROCEDURE — 90694 VACC AIIV4 NO PRSRV 0.5ML IM: CPT | Performed by: INTERNAL MEDICINE

## 2020-10-12 PROCEDURE — 99214 OFFICE O/P EST MOD 30 MIN: CPT | Performed by: INTERNAL MEDICINE

## 2020-10-12 PROCEDURE — G8420 CALC BMI NORM PARAMETERS: HCPCS | Performed by: INTERNAL MEDICINE

## 2020-10-12 RX ORDER — OXYCODONE AND ACETAMINOPHEN 10; 325 MG/1; MG/1
1 TABLET ORAL 3 TIMES DAILY PRN
Qty: 90 TABLET | Refills: 0 | Status: SHIPPED | OUTPATIENT
Start: 2020-10-30 | End: 2020-11-25 | Stop reason: SDUPTHER

## 2020-10-13 NOTE — PROGRESS NOTES
[address] [facility]     10/13/20  Sharon Rodarte : 3/1/1932 Sex: female  Age: 80 y.o. Chief Complaint   Patient presents with    Office Visit for Anticoagulation Management    Other     CHF       HPI  Patient presents today accompanied by her  and daughter to have INR checked/anticoagulation management/follow-up. Last note was post hospital follow-up visit regarding significant volume overload/ascites/cirrhosis. I cut her Lasix back from 80 mg twice daily to 40 mg twice daily because of dropping pressure also had him hold the Aldactone for the time being. She is down another 5 pounds from last visit. I also had decreased her lisinopril from 10 mg to 2.5 mg daily. They have been checking blood pressures and states they have been running in the 1 teens and 120 range pretty consistently. She is requesting refill on her controlled pain medication. She takes this for chronic back pain and has been on this for an extended period of time. States this does help significantly and allows her to get through the day. Oarrs report was run and okay. No evidence of abuse or diversion of her medication. She has seen Dr. Ty Cuadra of cardiology since I saw her last.  I did review his note. He apparently was pleased with her current status. She continues on her oxygen and nebulizer treatments. She has not been using daily nutritional supplement as I have asked. Daughter states they are going to start. She did have anticoagulation management today with an INR of only 1.2. No bleeding or thrombotic episodes. Daughter apparently sets up medications she tells me she is giving the pills daily. I am finding it difficult to understand why she is so low today. I will make adjustments and have her take 2.5 mg for the next 3 days then back down to her 1 mg dose thereafter and repeat numbers in a week.   Patient is following with urology, podiatry, renal, cardiology, pulmonary and did see hematology/oncology because of macrocytosis    Review of Systems     Constitutional:  negative for activity change, chills, diaphoresis,  and fever.  Positive for fatigue--- market weight loss post hospitalization and diuresis. HENT: Negative for congestion, ear pain, hearing loss, postnasal drip, rhinorrhea and sinus pain.    Respiratory: Positive for shortness of breath.-Stable negative for cough and wheezing.         COPD chronic oxygen use   Cardiovascular: Negative for chest pain, palpitations.  Positive for leg swelling.  -Improved post diuresis  Gastrointestinal: Positive for constipation.   Negative for abdominal pain, blood in stool, diarrhea,  and vomiting.  Recent hospitalization with volume overload/abdominal ascites/cirrhosis picture        Endocrine: Negative.    Genitourinary: Negative for difficulty urinating, dysuria, hematuria and urgency.         Reports frequent UTIs .does have some incontinence of urine following with urology. Bruce Mynor hematuria has been worked up.   Musculoskeletal: Positive for arthralgias.  back pain, and gait problem       Chronic pain syndrome/back pain--lower back pain and bilateral hip pain/chronic-continues on narcotic pain medication with success. T11-L1 vertebroplasty. Successful.   Skin: Negative.    Neurological: Negative for dizziness, weakness, light-headedness, numbness and headaches. Hematological: Negative.    Psychiatric/Behavioral: Admits to being more emotional and angry since taken off of her Prozac.-Placed back on again with improvement  Does appear to be in much better spirits today.       REST OF PERTINENT ROS GONE OVER AND WAS NEGATIVE.       :  Problem List: Chronic pain syndrome, Long-term current use of anticoagulant, Anticoagulant, Osteoarthritis, Taking  medication, Hyperlipidemia  Health Maintenance:  Mammogram - (1/23/2019)  Mammogram Screening - (1/23/2019)  Influenza Vaccination - (11/21/2018)  Bone Density Scan - (1/11/2011)  Colonoscopy - (2015)  Couseled on Home Safety - (2017)  Bone Density Test Screening - (2014)  Stress Test - -ok,3/11  EKG - ,11,3/13  Rectal Exam - 8/10  Breast Exam - 8/10,declined,  Hemmocult Cards - -neg x 3  2D ECHO - 8/15,   Mini Mental Status - --  Medical Problems:  Osteoarthritis  DVT - right-  Hypertension  Lumbar Spondylosis-Herniated Disc-Spinal Stenosis - epidurals/[place]  Hyperlipidemia, YAIR-BSO--Non Cancerous  Small AAA - 3.4cm--- .--3.5 cm per CT urogram  HX FX Left Ankle, Chronic Greater Trochanteric Bursitis, Depression, Cataracts  Coronary Artery Disease (CAD) - Previously declined cardiology fu  neurogenic claudication  Spinal Stenosis - L2-5 lumbar laminectomy-dr massey,[facility]  Kidney Stones, Anxiety, Diverticulitis, Osteoporosis, post laminectomy syndrome lumbar spine  Goiter - multinodular  hematuria - cystoscopy/retrograde-worked up by urology. urethral stenosis - dialated  chronic urethral inflammation, strep bovis bacteremia, Congestive Heart Failure (CHF), arterial thrombosis right arm  T12 compression fracture. - Vertebroplasty  Lumbar Spondylosis-Herniated Disc-Spinal Stenosis  T11 and L1 kyphoplasty -   Dilated nonischemic cardiomyopathy - Follows with cardiology  Copd  Hospitalization for congestive heart failure volume overload, ascites-  Cirrhosis per CAT scan  Pulmonary hypertension  Aortic regurgitation-moderate to severe per echo  Reviewed and updated. FH:  Father:  MI -  late 63's. Mother:  Breast Cancer - ? ?  AGE 80. Son 1:  Chronic Obstructive Pulmonary Disease (COPD) -  age 61. Sister 1:  melanoma -  age in her 52's. Reviewed, no changes. SH:  Marital: . Personal Habits: Cigarette Use: Does Not Smoke. Alcohol: does not use alcohol.   Reviewed, no changes.              Current Outpatient Medications:     [START ON 10/30/2020] oxyCODONE-acetaminophen (PERCOCET)  MG per tablet, Take 1 tablet by mouth 3 times daily as needed for Pain for up to 30 days. , Disp: 90 tablet, Rfl: 0    budesonide (PULMICORT) 0.5 MG/2ML nebulizer suspension, INHALE THE CONTENTS OF ONE VIAL (2ML) VIA NEBULIZER TWO TIMES A DAY, Disp: , Rfl:     ipratropium-albuterol (DUONEB) 0.5-2.5 (3) MG/3ML SOLN nebulizer solution, INHALE THE CONTENTS OF 1 VIAL (3ML) VIA NEBULIZER 3 TIMES A DAY AND AS NEEDED FOR DYSPNEA  COUGH OR WHEEZING, Disp: , Rfl:     warfarin (COUMADIN) 2.5 MG tablet, Take 2.5 mg by mouth Sundays only, Disp: , Rfl:     vitamin B-12 (CYANOCOBALAMIN) 500 MCG tablet, Take 500 mcg by mouth 2 times daily, Disp: , Rfl:     warfarin (COUMADIN) 1 MG tablet, Take 1 tablet by mouth daily (Patient taking differently: Take 1 mg by mouth daily Monday thru Saturday), Disp: 30 tablet, Rfl: 5    lisinopril (ZESTRIL) 2.5 MG tablet, Take 1 tablet by mouth daily, Disp: 30 tablet, Rfl: 5    FLUoxetine (PROZAC) 10 MG capsule, Take 1 capsule by mouth daily, Disp: 30 capsule, Rfl: 5    atorvastatin (LIPITOR) 40 MG tablet, Take 1 tablet by mouth daily, Disp: 30 tablet, Rfl: 5    isosorbide mononitrate (IMDUR) 30 MG extended release tablet, Take 1 tablet by mouth daily Take morning of surgery with a sip of water, Disp: 30 tablet, Rfl: 5    potassium chloride (KLOR-CON) 20 MEQ packet, Take 20 mEq by mouth daily, Disp: 30 each, Rfl: 5    furosemide (LASIX) 20 MG tablet, Take 2 pills qam (Patient taking differently: 40 mg 2 times daily ), Disp: 60 tablet, Rfl: 5    Lactobacillus (PROBIOTIC ACIDOPHILUS PO), Take by mouth, Disp: , Rfl:     docusate sodium (COLACE) 100 MG capsule, Take 100 mg by mouth daily, Disp: , Rfl:     nystatin (MYCOSTATIN) 133995 UNIT/GM powder, Apply 3 times daily. , Disp: 60 g, Rfl: 1  No Known Allergies    Past Medical History:   Diagnosis Date    AAA (abdominal aortic aneurysm) (HCC)     Anticoagulant long-term use 5/29/2019    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)     CAD Pain for up to 30 days. Stable on narcotic pain medication. Arterial embolism (HCC)  -     POCT INR  Anticoagulated. Weight loss  -     Cancel: Basic Metabolic Panel; Future  -     MAGNESIUM; Future  -     Comprehensive Metabolic Panel; Future  Significant volume diuresis with recent hospitalization    Congestive heart failure, unspecified HF chronicity, unspecified heart failure type ([facility] [address]  -     Cancel: Basic Metabolic Panel; Future  -     MAGNESIUM; Future  -     Comprehensive Metabolic Panel; Future  Stable. Anticoagulant long-term use    Atrial fibrillation, unspecified type (HCC)  Sinus rhythm stable and anticoagulated    Chronic kidney disease, unspecified CKD stage  Worsening status post diuresis    Other orders  -     INFLUENZA, QUADV, ADJUVANTED, 65 YRS =, IM, PF, PREFILL SYR, 0.5ML (FLUAD)    Plan: I will see her back 1 week INR with above instructions. We will check blood work today including CMP and magnesium level. Monitor blood pressure closely. We will need to monitor weight closely. Further adjustments in diuretics and blood pressure medication as indicated. Follow-up with above consultants. Prescription management performed. Fall precautions. Notify us with problems in the interim. Return in about 1 week (around 10/19/2020). Seen By:  Sheldon Chirinos MD      *Document was created using voice recognition software. Note was reviewed however may contain grammatical errors.

## 2020-10-19 ENCOUNTER — NURSE ONLY (OUTPATIENT)
Dept: FAMILY MEDICINE CLINIC | Age: 85
End: 2020-10-19
Payer: MEDICARE

## 2020-10-19 LAB
INTERNATIONAL NORMALIZATION RATIO, POC: 1.2
PROTHROMBIN TIME, POC: 14.9

## 2020-10-19 PROCEDURE — 93793 ANTICOAG MGMT PT WARFARIN: CPT | Performed by: INTERNAL MEDICINE

## 2020-10-19 PROCEDURE — 85610 PROTHROMBIN TIME: CPT | Performed by: INTERNAL MEDICINE

## 2020-10-22 ENCOUNTER — NURSE ONLY (OUTPATIENT)
Dept: FAMILY MEDICINE CLINIC | Age: 85
End: 2020-10-22
Payer: MEDICARE

## 2020-10-22 LAB
INTERNATIONAL NORMALIZATION RATIO, POC: 1.3
PROTHROMBIN TIME, POC: 15.9

## 2020-10-22 PROCEDURE — 85610 PROTHROMBIN TIME: CPT | Performed by: INTERNAL MEDICINE

## 2020-10-22 NOTE — PROGRESS NOTES
INR today 1.3. I have her take Coumadin 5 mg today tomorrow and Saturday then 2.5 mg Sunday. Repeat INR Monday and call me with results.

## 2020-10-26 ENCOUNTER — NURSE ONLY (OUTPATIENT)
Dept: FAMILY MEDICINE CLINIC | Age: 85
End: 2020-10-26
Payer: MEDICARE

## 2020-10-26 LAB
INTERNATIONAL NORMALIZATION RATIO, POC: 1.4
PROTHROMBIN TIME, POC: 16.5

## 2020-10-26 PROCEDURE — 85610 PROTHROMBIN TIME: CPT | Performed by: INTERNAL MEDICINE

## 2020-10-26 PROCEDURE — 93793 ANTICOAG MGMT PT WARFARIN: CPT | Performed by: INTERNAL MEDICINE

## 2020-10-26 NOTE — PROGRESS NOTES
Pt is here to get her INR checked per Dr Peyman Potter. Called and  Spoke with Dr Peyman Potter. Patient is to take 10mg today, 7.5mg Tuesday and Wednesday. Come back Thursday to have it rechecked.

## 2020-10-28 ENCOUNTER — TELEPHONE (OUTPATIENT)
Dept: FAMILY MEDICINE CLINIC | Age: 85
End: 2020-10-28

## 2020-10-28 NOTE — TELEPHONE ENCOUNTER
Called and let [address] know that Dr Ulises Lopez is ok with this. He would like called on his cell.

## 2020-10-28 NOTE — TELEPHONE ENCOUNTER
Steve Jasmine       She is calling to let you know that she sees them in the home often and can do the [facility] for you if you want. She said it is hard for them them to get out here to the office.

## 2020-10-29 ENCOUNTER — TELEPHONE (OUTPATIENT)
Dept: FAMILY MEDICINE CLINIC | Age: 85
End: 2020-10-29

## 2020-10-29 ENCOUNTER — ANTI-COAG VISIT (OUTPATIENT)
Dept: FAMILY MEDICINE CLINIC | Age: 85
End: 2020-10-29
Payer: MEDICARE

## 2020-10-29 LAB
INTERNATIONAL NORMALIZATION RATIO, POC: 2.8
PROTHROMBIN TIME, POC: NORMAL

## 2020-10-29 PROCEDURE — 85610 PROTHROMBIN TIME: CPT | Performed by: INTERNAL MEDICINE

## 2020-10-29 RX ORDER — POTASSIUM CHLORIDE 1500 MG/1
40 TABLET, FILM COATED, EXTENDED RELEASE ORAL 2 TIMES DAILY
COMMUNITY
End: 2020-10-29 | Stop reason: SDUPTHER

## 2020-10-29 RX ORDER — POTASSIUM CHLORIDE 1500 MG/1
40 TABLET, FILM COATED, EXTENDED RELEASE ORAL 2 TIMES DAILY
Qty: 120 TABLET | Refills: 5 | Status: SHIPPED
Start: 2020-10-29 | End: 2021-01-27 | Stop reason: SDUPTHER

## 2020-10-29 RX ORDER — FUROSEMIDE 20 MG/1
40 TABLET ORAL 2 TIMES DAILY
Qty: 60 TABLET | Refills: 5 | Status: SHIPPED
Start: 2020-10-29 | End: 2021-01-27 | Stop reason: SDUPTHER

## 2020-10-29 NOTE — TELEPHONE ENCOUNTER
GE calling they got rc for lasix.  Can they get a verbal to change to 120 pills for a 30 day to match sig ?  furosemide (LASIX) 20 MG tablet [[id number]]     Order Details   Dose: 40 mg  Route: Oral  Frequency: 2 TIMES DAILY    Dispense Quantity: 60 tablet  Refills: 5  Fills remaining: --             Sig: Take 2 tablets by mouth 2 times daily            Written Date: 10/29/20

## 2020-10-30 ENCOUNTER — HOSPITAL ENCOUNTER (OUTPATIENT)
Age: 85
Discharge: HOME | End: 2020-11-01
Payer: MEDICARE

## 2020-10-30 ENCOUNTER — TELEPHONE (OUTPATIENT)
Dept: FAMILY MEDICINE CLINIC | Age: 85
End: 2020-10-30

## 2020-10-30 LAB
ALBUMIN SERPL-MCNC: 3.8 G/DL (ref 3.5–5.2)
ALP BLD-CCNC: 142 U/L (ref 35–104)
ALT SERPL-CCNC: 24 U/L (ref 0–32)
ANION GAP SERPL CALCULATED.3IONS-SCNC: 15 MMOL/L (ref 7–16)
AST SERPL-CCNC: 37 U/L (ref 0–31)
BASOPHILS ABSOLUTE: 0.03 E9/L (ref 0–0.2)
BASOPHILS RELATIVE PERCENT: 0.4 % (ref 0–2)
BILIRUB SERPL-MCNC: 0.7 MG/DL (ref 0–1.2)
BUN BLDV-MCNC: 40 MG/DL (ref 8–23)
CALCIUM SERPL-MCNC: 9.6 MG/DL (ref 8.6–10.2)
CHLORIDE BLD-SCNC: 96 MMOL/L (ref 98–107)
CHOLESTEROL, TOTAL: 169 MG/DL (ref 0–199)
CO2: 27 MMOL/L (ref 22–29)
CREAT SERPL-MCNC: 1.1 MG/DL (ref 0.5–1)
EOSINOPHILS ABSOLUTE: 0.11 E9/L (ref 0.05–0.5)
EOSINOPHILS RELATIVE PERCENT: 1.4 % (ref 0–6)
GFR AFRICAN AMERICAN: 57
GFR NON-AFRICAN AMERICAN: 47 ML/MIN/1.73
GLUCOSE BLD-MCNC: 102 MG/DL (ref 74–99)
HCT VFR BLD CALC: 48 % (ref 34–48)
HDLC SERPL-MCNC: 45 MG/DL
HEMOGLOBIN: 14.4 G/DL (ref 11.5–15.5)
IMMATURE GRANULOCYTES #: 0.02 E9/L
IMMATURE GRANULOCYTES %: 0.3 % (ref 0–5)
LDL CHOLESTEROL CALCULATED: 99 MG/DL (ref 0–99)
LYMPHOCYTES ABSOLUTE: 1.24 E9/L (ref 1.5–4)
LYMPHOCYTES RELATIVE PERCENT: 16.3 % (ref 20–42)
MAGNESIUM: 2.3 MG/DL (ref 1.6–2.6)
MCH RBC QN AUTO: 31.9 PG (ref 26–35)
MCHC RBC AUTO-ENTMCNC: 30 % (ref 32–34.5)
MCV RBC AUTO: 106.2 FL (ref 80–99.9)
MONOCYTES ABSOLUTE: 0.53 E9/L (ref 0.1–0.95)
MONOCYTES RELATIVE PERCENT: 6.9 % (ref 2–12)
NEUTROPHILS ABSOLUTE: 5.7 E9/L (ref 1.8–7.3)
NEUTROPHILS RELATIVE PERCENT: 74.7 % (ref 43–80)
PDW BLD-RTO: 14.3 FL (ref 11.5–15)
PLATELET # BLD: 206 E9/L (ref 130–450)
PMV BLD AUTO: 12.2 FL (ref 7–12)
POTASSIUM SERPL-SCNC: 4.9 MMOL/L (ref 3.5–5)
RBC # BLD: 4.52 E12/L (ref 3.5–5.5)
SODIUM BLD-SCNC: 138 MMOL/L (ref 132–146)
TOTAL PROTEIN: 8.5 G/DL (ref 6.4–8.3)
TRIGL SERPL-MCNC: 127 MG/DL (ref 0–149)
VLDLC SERPL CALC-MCNC: 25 MG/DL
WBC # BLD: 7.6 E9/L (ref 4.5–11.5)

## 2020-10-30 PROCEDURE — 80053 COMPREHEN METABOLIC PANEL: CPT

## 2020-10-30 PROCEDURE — 36415 COLL VENOUS BLD VENIPUNCTURE: CPT

## 2020-10-30 PROCEDURE — 85025 COMPLETE CBC W/AUTO DIFF WBC: CPT

## 2020-10-30 PROCEDURE — 83735 ASSAY OF MAGNESIUM: CPT

## 2020-10-30 PROCEDURE — 80061 LIPID PANEL: CPT

## 2020-10-30 NOTE — TELEPHONE ENCOUNTER
[facility] called and said patient refused physical therapy today stating that she has just walked in the door from seeing you in the office.

## 2020-10-31 NOTE — TELEPHONE ENCOUNTER
Decrease Lasix from 20 mg 2 pills a.m. and 2 pills p.m. to 20 mg 2 pills a.m. 1 pill p.m. Decrease KCl from 20 mEq 2 pills a.m. and 2 pills p.m. to 20 mg 2 pills a.m. 1 pill p.m. and repeat labs in 2 weeks.   Make sure daughter is aware about this

## 2020-11-02 ENCOUNTER — ANTI-COAG VISIT (OUTPATIENT)
Dept: FAMILY MEDICINE CLINIC | Age: 85
End: 2020-11-02

## 2020-11-02 LAB — INR BLD: 3.4

## 2020-11-02 NOTE — PROGRESS NOTES
Hold next dose of Coumadin.   Then give 5 mg Monday through Friday and 2.5 mg Saturday and Sunday repeat INR 1 week and call results

## 2020-11-03 NOTE — TELEPHONE ENCOUNTER
IN putting all of this together somehow, if she is due for her narcotic she will need to be seen tomorrow. If she is not  we can schedule 2 weeks out and put in note field to check labs at that time. She will also need to have her INR checked in 1 week which can be done by the visiting nurse. Decrease the Lasix as noted in previous triage and leave her on the 2 pills of KCl that she is currently taking.

## 2020-11-03 NOTE — TELEPHONE ENCOUNTER
Notified daughter Ray Patrick. She states that pt was already given her percocet by you last month/appt cancelled for tomorrow. I tired to schedule for 2 weeks but you do not have any openings. Please advise where? (Daughter Ray Patrick will call [facility] to advise her that we will be drawing her labs in the office and to disregard the order for the CMP. Pt will have her INR done by Tho Gregorio in 1 week.   Emma Briceño on the instructions of the potassium and lasix) anterior t wave inversions

## 2020-11-03 NOTE — TELEPHONE ENCOUNTER
Notified daughter, Khadijah Patel. She states that pt had been taking potassium 2 tablets in the am only. Please advise if you want to changed the potassium instructions. Khadijah Patel wants to know if you want pt to keep her appt with you tomorrow?     I will call Allie Crews at [facility] and advise her of the lab draw. ([phone])

## 2020-11-09 ENCOUNTER — TELEPHONE (OUTPATIENT)
Dept: FAMILY MEDICINE CLINIC | Age: 85
End: 2020-11-09

## 2020-11-09 ENCOUNTER — ANTI-COAG VISIT (OUTPATIENT)
Dept: FAMILY MEDICINE CLINIC | Age: 85
End: 2020-11-09

## 2020-11-09 LAB — INR BLD: 1.8

## 2020-11-09 NOTE — TELEPHONE ENCOUNTER
Tried to reach patient regarding her INR of 1.8. No answer went to voicemail. We will have the girls notified to call patient in the morning take 10 mg of Coumadin tomorrow then 5 mg Monday through Saturday and 2.5 mg Sunday with a repeat INR in 1 week with home health nurse.     Tell them I want to be called personally on my cell phone [phone].

## 2020-11-10 NOTE — TELEPHONE ENCOUNTER
This is like veterinary medicine I swear. If she did not take it for 2 days that she is probably going to be too high with the changes I made. Have her do the 10 mg today, 5 mg Wednesday and Thursday, and 2.5 mg Friday Saturday and Sunday. Repeat INR on Monday and call me the results.

## 2020-11-10 NOTE — TELEPHONE ENCOUNTER
Daughter Jing cShwartz called this morning to report that her dad did not put her warfarin in the pill box when filling them, so she did not take it for 2 days. I did give her the new directions below. She will make sure that she takes the correct amount. I notified Ela Real at [facility] that this needed checked in one week and to call you with those results.

## 2020-11-16 ENCOUNTER — TELEPHONE (OUTPATIENT)
Dept: FAMILY MEDICINE CLINIC | Age: 85
End: 2020-11-16

## 2020-11-16 LAB
ALBUMIN SERPL-MCNC: NORMAL G/DL
ALBUMIN: 3.1 GM/DL (ref 3.1–4.5)
ALP BLD-CCNC: 116 U/L (ref 45–117)
ALP BLD-CCNC: NORMAL U/L
ALT SERPL-CCNC: 29 U/L (ref 12–78)
ALT SERPL-CCNC: NORMAL U/L
ANION GAP SERPL CALCULATED.3IONS-SCNC: NORMAL MMOL/L
AST SERPL-CCNC: 32 IU/L (ref 3–35)
AST SERPL-CCNC: NORMAL U/L
BILIRUB SERPL-MCNC: 0.8 MG/DL (ref 0.2–1)
BILIRUB SERPL-MCNC: NORMAL MG/DL
BUN BLDV-MCNC: 30 MG/DL (ref 7–24)
BUN BLDV-MCNC: NORMAL MG/DL
CALCIUM SERPL-MCNC: 8.8 MG/DL (ref 8.5–10.5)
CALCIUM SERPL-MCNC: NORMAL MG/DL
CHLORIDE BLD-SCNC: 106 MMOL/L (ref 98–107)
CHLORIDE BLD-SCNC: NORMAL MMOL/L
CO2: 30 MMOL/L (ref 21–32)
CO2: NORMAL
CREAT SERPL-MCNC: 1.16 MG/DL (ref 0.55–1.02)
CREAT SERPL-MCNC: NORMAL MG/DL
GFR AFRICAN AMERICAN: 53 ML/MIN
GFR CALCULATED: NORMAL
GFR SERPL CREATININE-BSD FRML MDRD: 44 ML/MIN/
GLUCOSE BLD-MCNC: NORMAL MG/DL
GLUCOSE: 112 MG/DL (ref 65–99)
POTASSIUM SERPL-SCNC: 4.9 MMOL/L (ref 3.5–5.1)
POTASSIUM SERPL-SCNC: NORMAL MMOL/L
SODIUM BLD-SCNC: 138 MMOL/L (ref 136–145)
SODIUM BLD-SCNC: NORMAL MMOL/L
TOTAL PROTEIN: 8.3 GM/DL (ref 6.4–8.2)
TOTAL PROTEIN: NORMAL

## 2020-11-17 NOTE — TELEPHONE ENCOUNTER
No changes now. May consider cutting back diuretic and potassium a little at visit depending on how the swelling is doing.

## 2020-11-22 NOTE — PROGRESS NOTES
Pt here for her 1 week INR check per Dr Samuel Lab. Pt took 2 mg Monday, 2.5mg Tuesday & Wednesday, then 1 mg the remainder of days until recheck today. Per Dr Samuel Lab, patient is to take 5mg today then 2.5mg remainder of days. Recheck thursdday 10/22.
Abdomen soft, non-tender and non-distended, no rebound, no guarding and no masses. no hepatosplenomegaly.

## 2020-11-23 LAB
INTERNATIONAL NORMALIZATION RATIO, POC: 2.5
PROTHROMBIN TIME, POC: NORMAL

## 2020-11-24 ENCOUNTER — ANTI-COAG VISIT (OUTPATIENT)
Dept: FAMILY MEDICINE CLINIC | Age: 85
End: 2020-11-24
Payer: MEDICARE

## 2020-11-24 PROCEDURE — 85610 PROTHROMBIN TIME: CPT | Performed by: INTERNAL MEDICINE

## 2020-11-25 ENCOUNTER — VIRTUAL VISIT (OUTPATIENT)
Dept: FAMILY MEDICINE CLINIC | Age: 85
End: 2020-11-25
Payer: MEDICARE

## 2020-11-25 PROCEDURE — G8427 DOCREV CUR MEDS BY ELIG CLIN: HCPCS | Performed by: INTERNAL MEDICINE

## 2020-11-25 PROCEDURE — 3023F SPIROM DOC REV: CPT | Performed by: INTERNAL MEDICINE

## 2020-11-25 PROCEDURE — G8420 CALC BMI NORM PARAMETERS: HCPCS | Performed by: INTERNAL MEDICINE

## 2020-11-25 PROCEDURE — G8926 SPIRO NO PERF OR DOC: HCPCS | Performed by: INTERNAL MEDICINE

## 2020-11-25 PROCEDURE — 1036F TOBACCO NON-USER: CPT | Performed by: INTERNAL MEDICINE

## 2020-11-25 PROCEDURE — 1090F PRES/ABSN URINE INCON ASSESS: CPT | Performed by: INTERNAL MEDICINE

## 2020-11-25 PROCEDURE — 1123F ACP DISCUSS/DSCN MKR DOCD: CPT | Performed by: INTERNAL MEDICINE

## 2020-11-25 PROCEDURE — 4040F PNEUMOC VAC/ADMIN/RCVD: CPT | Performed by: INTERNAL MEDICINE

## 2020-11-25 PROCEDURE — G8484 FLU IMMUNIZE NO ADMIN: HCPCS | Performed by: INTERNAL MEDICINE

## 2020-11-25 PROCEDURE — 99214 OFFICE O/P EST MOD 30 MIN: CPT | Performed by: INTERNAL MEDICINE

## 2020-11-25 RX ORDER — OXYCODONE AND ACETAMINOPHEN 10; 325 MG/1; MG/1
1 TABLET ORAL 3 TIMES DAILY PRN
Qty: 90 TABLET | Refills: 0 | Status: SHIPPED | OUTPATIENT
Start: 2020-11-25 | End: 2020-12-18 | Stop reason: SDUPTHER

## 2020-11-27 ENCOUNTER — TELEPHONE (OUTPATIENT)
Dept: FAMILY MEDICINE CLINIC | Age: 85
End: 2020-11-27

## 2020-11-27 NOTE — TELEPHONE ENCOUNTER
After reviewing recent visit and continued weight loss I would like her to cut her Lasix back from 40 mg twice a day to 20 mg twice a day and observe for any fluid retention or increasing shortness of breath.

## 2020-11-27 NOTE — PROGRESS NOTES
TeleMedicine Video Visit    This visit was performed as a virtual video visit using a synchronous, two-way, audio-video telehealth technology platform. Patient identification was verified at the start of the visit, including the patient's telephone number and physical location. I discussed with the patient the nature of our telehealth visits, that:     1. Due to the nature of an audio- video modality, the only components of a physical exam that could be done are the elements supported by direct observation. 2. I would evaluate the patient and recommend diagnostics and treatments based on my assessment. 3. If it was felt that the patient should be evaluated in clinic or an emergency room setting, then they would be directed there. 4. Our sessions are not being recorded and that personal health information is protected. 5. Our team would provide follow up care in person if/when the patient needs it. Patient does agree to proceed with telemedicine consultation. Patient's location: home address in [facility]  Physician  location Office address in [place] other people involved in call--daughter      Time spent: Greater than Not billed by time    This visit was completed virtually using Doxy. me       [facility]     20  Mariana Jack : 3/1/1932 Sex: female  Age: 80 y.o. Chief Complaint   Patient presents with    Chronic Pain     refill on pain medication    Office Visit for Anticoagulation Management    Weight Loss     has lost 12 pounds since she was last seen in the office; daughter wondering if they can cut back on lasix becuase of the weight loss and she hasnt had any swelling in her legs at all       HPI    Patient seen today via virtual video visit secondary to ongoing COVID-19 pandemic status. She is being seen for multiple medical problems. She is accompanied by her daughter at the visit today. I reviewed last several notes. States she is feeling better than she has in a while. She did have INR checked couple days ago and it was 2.5. She is on 5 mg daily she said no bleeding or thrombotic episodes. Daughter puts her medications out for her. She is down another 12 pounds although she states she is eating well. I am going to decrease her Lasix from 40 mg twice a day to 20 mg twice a day as it also looks like she is getting a little bit dry by her last labs with a BUN of 30. He tells me blood pressures have been in good range last 1 checked was 127/57. She is requesting refill on her controlled pain medication. She takes this for chronic back pain and has been on this for an extended period of time. States this does help significantly and allows her to get through the day. Oarrs report was run and okay. No evidence of abuse or diversion of her medication. She continues on her oxygen and nebulizer treatments. She still has not been using daily nutritional supplement as I have asked. Daughter states they are going to start. She has had no falls. She is finished with PT/ [facility] still checking her INRs. She will have her next one on Monday. Patient is following with urology, podiatry, renal, cardiology, pulmonary and did see hematology/oncology because of macrocytosis    Review of Systems     Constitutional:  negative for activity change, chills, diaphoresis,  and fever.  Positive for fatigue--- marked weight loss post hospitalization and diuresis.   HENT: Negative for congestion, ear pain, hearing loss, postnasal drip, rhinorrhea and sinus pain.    Respiratory: Positive for shortness of breath.-Stable negative for cough and wheezing.         COPD chronic oxygen use   Cardiovascular: Negative for chest pain, palpitations.  Positive for leg swelling.  -Improved post diuresis  Gastrointestinal: Positive for constipation.   Negative for abdominal pain, blood in stool, diarrhea,  and vomiting.  Recent hospitalization with volume overload/abdominal ascites/cirrhosis picture        Endocrine: Negative.    Genitourinary: Negative for difficulty urinating, dysuria, hematuria and urgency.         Reports frequent UTIs .does have some incontinence of urine following with urology. Berenice Skiff hematuria has been worked up.   Musculoskeletal: Positive for arthralgias.  back pain, and gait problem       Chronic pain syndrome/back pain--lower back pain and bilateral hip pain/chronic-continues on narcotic pain medication with success. T11-L1 vertebroplasty. Successful.   Skin: Negative.    Neurological: Negative for dizziness, weakness, light-headedness, numbness and headaches. Hematological: Macrocytosis-seen by hematology/oncolog  yPsychiatric/Behavioral: Admits to being more emotional and angry since taken off of her Prozac.-Placed back on again with improvement  Does appear to be in much better spirits today.       REST OF PERTINENT ROS GONE OVER AND WAS NEGATIVE.      PMH  Problem List: Chronic pain syndrome, Long-term current use of anticoagulant, Anticoagulant, Osteoarthritis, Taking  medication, Hyperlipidemia  Health Maintenance:  Mammogram - (1/23/2019)  Mammogram Screening - (1/23/2019)  Influenza Vaccination - (11/21/2018)  Bone Density Scan - (1/11/2011)  Colonoscopy - (11/12/2015)  Couseled on Home Safety - (4/12/2017)  Bone Density Test Screening - (5/23/2014)  Stress Test - 1/08-ok,3/11  EKG - 7/09,11,3/13  Rectal Exam - 8/10  Breast Exam - 8/10,declined,2/14  Hemmocult Cards - 2/13-neg x 3  2D ECHO - 8/15, 9/20  Mini Mental Status - 4/19--29/30  Medical Problems:  Osteoarthritis  DVT - right-1/04  Hypertension  Lumbar Spondylosis-Herniated Disc-Spinal Stenosis - epidurals/[place]  Hyperlipidemia, YAIR-BSO--Non Cancerous  Small AAA - 3.4cm--- 2/19.--3.5 cm per CT urogram  HX FX Left Ankle, Chronic Greater Trochanteric Bursitis, Depression, Cataracts  Coronary Artery Disease (CAD) - Previously declined cardiology fu  neurogenic claudication  Spinal Stenosis - L2-5 lumbar only, Disp: , Rfl:     vitamin B-12 (CYANOCOBALAMIN) 500 MCG tablet, Take 500 mcg by mouth 2 times daily, Disp: , Rfl:     warfarin (COUMADIN) 1 MG tablet, Take 1 tablet by mouth daily (Patient taking differently: Take 1 mg by mouth daily Monday thru Saturday), Disp: 30 tablet, Rfl: 5    lisinopril (ZESTRIL) 2.5 MG tablet, Take 1 tablet by mouth daily, Disp: 30 tablet, Rfl: 5    FLUoxetine (PROZAC) 10 MG capsule, Take 1 capsule by mouth daily, Disp: 30 capsule, Rfl: 5    atorvastatin (LIPITOR) 40 MG tablet, Take 1 tablet by mouth daily, Disp: 30 tablet, Rfl: 5    isosorbide mononitrate (IMDUR) 30 MG extended release tablet, Take 1 tablet by mouth daily Take morning of surgery with a sip of water, Disp: 30 tablet, Rfl: 5    Lactobacillus (PROBIOTIC ACIDOPHILUS PO), Take by mouth, Disp: , Rfl:     docusate sodium (COLACE) 100 MG capsule, Take 100 mg by mouth daily, Disp: , Rfl:     nystatin (MYCOSTATIN) 901079 UNIT/GM powder, Apply 3 times daily. , Disp: 60 g, Rfl: 1  No Known Allergies    Past Medical History:   Diagnosis Date    AAA (abdominal aortic aneurysm) (HCC)     Anticoagulant long-term use 5/29/2019    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)     CAD (coronary artery disease)     CHF (congestive heart failure) (HCC)     Chronic pain syndrome 5/29/2019    HTN (hypertension)     Hx of blood clots 2015    right upper arm    Hyperlipemia      Past Surgical History:   Procedure Laterality Date    [facility]GERY  2010    [facility], pt unsure    COLONOSCOPY      EYE SURGERY Bilateral     cataracts    FIXATION KYPHOPLASTY  10/02/2017    T12 kyphoplasty Dr Luis A Garcia KYPHOPLASTY  11/20/2017    T11-L1 kypho    HYSTERECTOMY      HYSTERECTOMY, TOTAL ABDOMINAL      TONSILLECTOMY      TRANSESOPHAGEAL ECHOCARDIOGRAM  11/04/15     Family History   Problem Relation Age of Onset    Breast Cancer Mother [de-identified]    Heart Disease Father     Cancer Sister         melanoma Social History     Socioeconomic History    Marital status:      Spouse name: Not on file    Number of children: Not on file    Years of education: Not on file    Highest education level: Not on file   Occupational History    Not on file   Social Needs    Financial resource strain: Patient refused    Food insecurity     Worry: Patient refused     Inability: Patient refused    Transportation needs     Medical: Patient refused     Non-medical: Patient refused   Tobacco Use    Smoking status: Former Smoker    Smokeless tobacco: Never Used    Tobacco comment: quit about 30 yrs ago   Substance and Sexual Activity    Alcohol use: No    Drug use: No    Sexual activity: Not on file   Lifestyle    Physical activity     Days per week: Not on file     Minutes per session: Not on file    Stress: Not on file   Relationships    Social connections     Talks on phone: Not on file     Gets together: Not on file     Attends Church service: Not on file     Active member of club or organization: Not on file     Attends meetings of clubs or organizations: Not on file     Relationship status: Not on file    Intimate partner violence     Fear of current or ex partner: Not on file     Emotionally abused: Not on file     Physically abused: Not on file     Forced sexual activity: Not on file   Other Topics Concern    Not on file   Social History Narrative    Not on file       There were no vitals filed for this visit. Physical Exam  Vitals signs and nursing note reviewed. Constitutional:       General: She is not in acute distress. HENT:      Head: Normocephalic and atraumatic. Ears:      Comments: Hard of hearing. Neck:      Musculoskeletal: Normal range of motion. Pulmonary:      Effort: Pulmonary effort is normal. No respiratory distress. Comments: Using her oxygen  Musculoskeletal:         General: Swelling present.       Comments: Showed MiraLAX which still do have some swelling but

## 2020-12-14 LAB
INTERNATIONAL NORMALIZATION RATIO, POC: 3.1
PROTHROMBIN TIME, POC: NORMAL

## 2020-12-18 ENCOUNTER — VIRTUAL VISIT (OUTPATIENT)
Dept: FAMILY MEDICINE CLINIC | Age: 85
End: 2020-12-18
Payer: MEDICARE

## 2020-12-18 PROCEDURE — G8420 CALC BMI NORM PARAMETERS: HCPCS | Performed by: INTERNAL MEDICINE

## 2020-12-18 PROCEDURE — 1123F ACP DISCUSS/DSCN MKR DOCD: CPT | Performed by: INTERNAL MEDICINE

## 2020-12-18 PROCEDURE — G8427 DOCREV CUR MEDS BY ELIG CLIN: HCPCS | Performed by: INTERNAL MEDICINE

## 2020-12-18 PROCEDURE — 1090F PRES/ABSN URINE INCON ASSESS: CPT | Performed by: INTERNAL MEDICINE

## 2020-12-18 PROCEDURE — 1036F TOBACCO NON-USER: CPT | Performed by: INTERNAL MEDICINE

## 2020-12-18 PROCEDURE — 4040F PNEUMOC VAC/ADMIN/RCVD: CPT | Performed by: INTERNAL MEDICINE

## 2020-12-18 PROCEDURE — G8484 FLU IMMUNIZE NO ADMIN: HCPCS | Performed by: INTERNAL MEDICINE

## 2020-12-18 PROCEDURE — 99214 OFFICE O/P EST MOD 30 MIN: CPT | Performed by: INTERNAL MEDICINE

## 2020-12-18 PROCEDURE — 85610 PROTHROMBIN TIME: CPT | Performed by: INTERNAL MEDICINE

## 2020-12-18 RX ORDER — OXYCODONE AND ACETAMINOPHEN 10; 325 MG/1; MG/1
1 TABLET ORAL 3 TIMES DAILY PRN
Qty: 90 TABLET | Refills: 0 | Status: SHIPPED | OUTPATIENT
Start: 2020-12-18 | End: 2021-01-17

## 2020-12-18 NOTE — PROGRESS NOTES
TeleMedicine Video Visit    This visit was performed as a virtual video visit using a synchronous, two-way, audio-video telehealth technology platform. Patient identification was verified at the start of the visit, including the patient's telephone number and physical location. I discussed with the patient the nature of our telehealth visits, that:     1. Due to the nature of an audio- video modality, the only components of a physical exam that could be done are the elements supported by direct observation. 2. I would evaluate the patient and recommend diagnostics and treatments based on my assessment. 3. If it was felt that the patient should be evaluated in clinic or an emergency room setting, then they would be directed there. 4. Our sessions are not being recorded and that personal health information is protected. 5. Our team would provide follow up care in person if/when the patient needs it. Patient does agree to proceed with telemedicine consultation. Patient's location: home address in [facility]  Physician  location Office address in [place] other people involved in call--daughter      Time spent: Greater than 25    This visit was completed virtually using Doxy. [facility]anna [facility]     20  Loni Mohs : 3/1/1932 Sex: female  Age: 80 y.o. Chief Complaint   Patient presents with    Chronic Pain       HPI    Encounter with patient today via virtual video visit secondary to ongoing COVID-19 pandemic status. He is accompanied by her daughter throughout the visit. Patient has been homebound recently and receiving skilled nursing and home health aide service. Last INR done 3 days ago was 3.1. I had the nurse hold her dose of Coumadin for that day and decreased from 5 mg daily to 5 mg Monday through Saturday and 2 and half milligrams on . They are going to repeat her INR on Monday of this coming week. I did fill out her home health forms.      Chronic pain syndrome/back pain--lower back pain and bilateral hip pain/chronic-continues on narcotic pain medication with success. T11-L1 vertebroplasty. Successful.   Skin: Negative.    Neurological: Negative for dizziness, weakness, light-headedness, numbness and headaches. Hematological: Macrocytosis-seen by hematology/oncolog  yPsychiatric/Behavioral: Admits to being more emotional and angry since taken off of her Prozac.-Placed back on again with improvement  Does appear to be in much better spirits today. REST OF PERTINENT ROS GONE OVER AND WAS NEGATIVE. PMH  Problem List: Chronic pain syndrome, Long-term current use of anticoagulant, Anticoagulant, Osteoarthritis, Taking  medication, Hyperlipidemia  Health Maintenance:  Mammogram - (1/23/2019)  Mammogram Screening - (1/23/2019)  Influenza Vaccination - (11/21/2018)  Bone Density Scan - (1/11/2011)  Colonoscopy - (11/12/2015)  Couseled on Home Safety - (4/12/2017)  Bone Density Test Screening - (5/23/2014)  Stress Test - 1/08-ok,3/11  EKG - 7/09,11,3/13  Rectal Exam - 8/10  Breast Exam - 8/10,declined,2/14  Hemmocult Cards - 2/13-neg x 3  2D ECHO - 8/15, 9/20  Mini Mental Status - 4/19--29/30  Medical Problems:  Osteoarthritis  DVT - right-1/04  Hypertension  Lumbar Spondylosis-Herniated Disc-Spinal Stenosis - epidurals/[place]  Hyperlipidemia, YAIR-BSO--Non Cancerous  Small AAA - 3.4cm--- 2/19.--3.5 cm per CT urogram  HX FX Left Ankle, Chronic Greater Trochanteric Bursitis, Depression, Cataracts  Coronary Artery Disease (CAD) - Previously declined cardiology fu  neurogenic claudication  Spinal Stenosis - L2-5 lumbar laminectomy-dr massey,[facility]  Kidney Stones, Anxiety, Diverticulitis, Osteoporosis, post laminectomy syndrome lumbar spine  Goiter - multinodular  hematuria - cystoscopy/retrograde-worked up by urology.   urethral stenosis - dialated chronic urethral inflammation, strep bovis bacteremia, Congestive Heart Failure (CHF), arterial thrombosis right arm  T12 compression fracture. - Vertebroplasty  Lumbar Spondylosis-Herniated Disc-Spinal Stenosis  T11 and L1 kyphoplasty -   Dilated nonischemic cardiomyopathy - Follows with cardiology  Copd  Hospitalization for congestive heart failure volume overload, ascites-  Cirrhosis per CAT scan  Pulmonary hypertension  Aortic regurgitation-moderate to severe per echo  Reviewed and updated. FH:  Father:  MI -  late 63's. Mother:  Breast Cancer - ? ?  AGE 80. Son 1:  Chronic Obstructive Pulmonary Disease (COPD) -  age 61. Sister 1:  melanoma -  age in her 52's. Reviewed, no changes. SH:  Marital: . Personal Habits: Cigarette Use: Does Not Smoke. Alcohol: does not use alcohol. Reviewed, no changes.             Current Outpatient Medications:     oxyCODONE-acetaminophen (PERCOCET)  MG per tablet, Take 1 tablet by mouth 3 times daily as needed for Pain for up to 30 days. , Disp: 90 tablet, Rfl: 0    furosemide (LASIX) 20 MG tablet, Take 2 tablets by mouth 2 times daily, Disp: 60 tablet, Rfl: 5    potassium chloride (KLOR-CON M) 20 MEQ TBCR extended release tablet, Take 2 tablets by mouth 2 times daily, Disp: 120 tablet, Rfl: 5    budesonide (PULMICORT) 0.5 MG/2ML nebulizer suspension, INHALE THE CONTENTS OF ONE VIAL (2ML) VIA NEBULIZER TWO TIMES A DAY, Disp: , Rfl:     ipratropium-albuterol (DUONEB) 0.5-2.5 (3) MG/3ML SOLN nebulizer solution, INHALE THE CONTENTS OF 1 VIAL (3ML) VIA NEBULIZER 3 TIMES A DAY AND AS NEEDED FOR DYSPNEA  COUGH OR WHEEZING, Disp: , Rfl:     warfarin (COUMADIN) 2.5 MG tablet, Take 2.5 mg by mouth Sundays only, Disp: , Rfl:     vitamin B-12 (CYANOCOBALAMIN) 500 MCG tablet, Take 500 mcg by mouth 2 times daily, Disp: , Rfl:   warfarin (COUMADIN) 1 MG tablet, Take 1 tablet by mouth daily (Patient taking differently: Take 1 mg by mouth daily Monday thru Saturday), Disp: 30 tablet, Rfl: 5    lisinopril (ZESTRIL) 2.5 MG tablet, Take 1 tablet by mouth daily, Disp: 30 tablet, Rfl: 5    FLUoxetine (PROZAC) 10 MG capsule, Take 1 capsule by mouth daily, Disp: 30 capsule, Rfl: 5    atorvastatin (LIPITOR) 40 MG tablet, Take 1 tablet by mouth daily, Disp: 30 tablet, Rfl: 5    isosorbide mononitrate (IMDUR) 30 MG extended release tablet, Take 1 tablet by mouth daily Take morning of surgery with a sip of water, Disp: 30 tablet, Rfl: 5    Lactobacillus (PROBIOTIC ACIDOPHILUS PO), Take by mouth, Disp: , Rfl:     docusate sodium (COLACE) 100 MG capsule, Take 100 mg by mouth daily, Disp: , Rfl:     nystatin (MYCOSTATIN) 918358 UNIT/GM powder, Apply 3 times daily. , Disp: 60 g, Rfl: 1  No Known Allergies    Past Medical History:   Diagnosis Date    AAA (abdominal aortic aneurysm) (HCC)     Anticoagulant long-term use 5/29/2019    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)     CAD (coronary artery disease)     CHF (congestive heart failure) (HCC)     Chronic pain syndrome 5/29/2019    HTN (hypertension)     Hx of blood clots 2015    right upper arm    Hyperlipemia      Past Surgical History:   Procedure Laterality Date    BACK SURGERY  2010    [facility], pt unsure    COLONOSCOPY      EYE SURGERY Bilateral     cataracts    FIXATION KYPHOPLASTY  10/02/2017    T12 kyphoplasty Dr Harry Power KYPHOPLASTY  11/20/2017    T11-L1 kypho    HYSTERECTOMY      HYSTERECTOMY, TOTAL ABDOMINAL      TONSILLECTOMY      TRANSESOPHAGEAL ECHOCARDIOGRAM  11/04/15     Family History   Problem Relation Age of Onset    Breast Cancer Mother [de-identified]    Heart Disease Father     Cancer Sister         melanoma     Social History     Socioeconomic History    Marital status:      Spouse name: Not on file  Number of children: Not on file    Years of education: Not on file    Highest education level: Not on file   Occupational History    Not on file   Social Needs    Financial resource strain: Patient refused    Food insecurity     Worry: Patient refused     Inability: Patient refused    Transportation needs     Medical: Patient refused     Non-medical: Patient refused   Tobacco Use    Smoking status: Former Smoker    Smokeless tobacco: Never Used    Tobacco comment: quit about 30 yrs ago   Substance and Sexual Activity    Alcohol use: No    Drug use: No    Sexual activity: Not on file   Lifestyle    Physical activity     Days per week: Not on file     Minutes per session: Not on file    Stress: Not on file   Relationships    Social connections     Talks on phone: Not on file     Gets together: Not on file     Attends Congregational service: Not on file     Active member of club or organization: Not on file     Attends meetings of clubs or organizations: Not on file     Relationship status: Not on file    Intimate partner violence     Fear of current or ex partner: Not on file     Emotionally abused: Not on file     Physically abused: Not on file     Forced sexual activity: Not on file   Other Topics Concern    Not on file   Social History Narrative    Not on file       There were no vitals filed for this visit. Physical Exam    Vitals signs and nursing note reviewed. Constitutional:       General: She is not in acute distress. HENT:      Head: Normocephalic and atraumatic. Ears:      Comments: Hard of hearing. Neck:      Musculoskeletal: Normal range of motion. Pulmonary:      Effort: Pulmonary effort is normal. No respiratory distress. Comments: Using her oxygen  Musculoskeletal:         General: Swelling present. Comments: Showed  Legs which still do have some swelling but markedly improved from what they were.    Neurological: Mental Status: She is alert and oriented to person, place, and time. Psychiatric:         Mood and Affect: Mood normal.         Behavior: Behavior normal.         Thought Content: Thought content normal.         Judgment: Judgment normal.            Assessment and Plan:  Carmelo Esquivel was seen today for chronic pain. Diagnoses and all orders for this visit:    Chronic pain syndrome  -     oxyCODONE-acetaminophen (PERCOCET)  MG per tablet; Take 1 tablet by mouth 3 times daily as needed for Pain for up to 30 days. Stable on her pain medication. Essential hypertension  Stable on medication    Atrial fibrillation, unspecified type ([address]  Rate controlled and on anticoagulation    Anticoagulant long-term use    Edema, unspecified type  Improving. Arterial embolism (HCC)  -     POCT INR    COPD   Nebulizer treatment and ask her to get back on her oxygen regularly. Plan: I will see her back next month for an office visit. Look over September office note at that time. Prescription management performed. Monitor weight for any further weight loss. Fall precautions. We will check blood work when I see her next month. Face-to-face time greater than 25 minutes with greater than 50% of time spent in counseling and coordination of her complicated care. Forms filled out as stated above notify us of problems in the interim. Return in about 1 month (around 1/18/2021). Seen By:  Niyah Sarah MD      *Document was created using voice recognition software. Note was reviewed however may contain grammatical errors.

## 2020-12-21 PROBLEM — N20.0 CALCULUS OF KIDNEY: Status: ACTIVE | Noted: 2017-05-24

## 2020-12-22 ENCOUNTER — VIRTUAL VISIT (OUTPATIENT)
Dept: CARDIOLOGY CLINIC | Age: 85
End: 2020-12-22
Payer: MEDICARE

## 2020-12-22 PROCEDURE — 99442 PR PHYS/QHP TELEPHONE EVALUATION 11-20 MIN: CPT | Performed by: INTERNAL MEDICINE

## 2020-12-22 NOTE — PROGRESS NOTES
CHIEF COMPLAINT: CHF    HISTORY OF PRESENT ILLNESS: Patient is a 80 y.o. female seen at the request of Gilda Bell MD.    Baseline SOB. No CP. Medical history includes:   1. CHF due to LV systolic and diastolic dysfunction. 2. Essential hypertension. 3. Hyperlipidemia. 4. Small abdominal aortic aneurysm documented by CT scan, 02/25/2013, measuring approximately 3 cm in diameter.    5. 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 6. Holter monitor, 10/22/2015. Sinus bradycardia. Occasional ventricular and supraventricular ectopy, but no AF documented.    7. TENISHA, 11/04/2015. Global LV hypokinesis with mild systolic dysfunction. EF 40-45%. Stage II diastolic dysfunction. Dilated LA with spontaneous echo contrast within the atrial cavity and a thrombus within the left atrial appendage. MAC with mild mitral insufficiency. Aortic sclerosis without stenosis, but with mild aortic insufficiency. Mild tricuspid insufficiency with Doppler evidence for severe elevation in RV systolic pressures. Moderate atherosclerosis of the thoracic aorta, but no mobile atheromata present.    8. Holter monitor, [facility], 02/23/2016, sinus rhythm with frequent isolated PVC's. Average heart rate 64 with heart rate varying from 51 to 82 beats per minute. No prolonged pauses. No symptoms listed. 9. Kyphoplasty by Dr. Keeley Quintanilla, 10/2/2017.     Past Medical History:   Diagnosis Date    AAA (abdominal aortic aneurysm) (HCC)     Anticoagulant long-term use 5/29/2019    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)     CAD (coronary artery disease)     CHF (congestive heart failure) (HCC)     Chronic pain syndrome 5/29/2019    HTN (hypertension)     Hx of blood clots 2015    right upper arm    Hyperlipemia        Patient Active Problem List   Diagnosis    CHF (congestive heart failure) ([address]    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 ([address]  Essential hypertension    Hyperlipemia    AAA (abdominal aortic aneurysm) (HCC)    CAD (coronary artery disease)    T12 compression fracture (HCC)    Compression fracture of body of thoracic vertebra (HCC)    Anticoagulant long-term use    Chronic pain syndrome    Chronic, continuous use of opioids    Osteoarthrosis    Calculus of kidney       No Known Allergies    Current Outpatient Medications   Medication Sig Dispense Refill    oxyCODONE-acetaminophen (PERCOCET)  MG per tablet Take 1 tablet by mouth 3 times daily as needed for Pain for up to 30 days.  90 tablet 0    furosemide (LASIX) 20 MG tablet Take 2 tablets by mouth 2 times daily 60 tablet 5    potassium chloride (KLOR-CON M) 20 MEQ TBCR extended release tablet Take 2 tablets by mouth 2 times daily 120 tablet 5    budesonide (PULMICORT) 0.5 MG/2ML nebulizer suspension INHALE THE CONTENTS OF ONE VIAL (2ML) VIA NEBULIZER TWO TIMES A DAY      ipratropium-albuterol (DUONEB) 0.5-2.5 (3) MG/3ML SOLN nebulizer solution INHALE THE CONTENTS OF 1 VIAL (3ML) VIA NEBULIZER 3 TIMES A DAY AND AS NEEDED FOR DYSPNEA  COUGH OR WHEEZING      warfarin (COUMADIN) 2.5 MG tablet Take 2.5 mg by mouth Sundays only      vitamin B-12 (CYANOCOBALAMIN) 500 MCG tablet Take 500 mcg by mouth 2 times daily      warfarin (COUMADIN) 1 MG tablet Take 1 tablet by mouth daily (Patient taking differently: Take 5 mg by mouth daily Monday thru Saturday) 30 tablet 5    lisinopril (ZESTRIL) 2.5 MG tablet Take 1 tablet by mouth daily 30 tablet 5    FLUoxetine (PROZAC) 10 MG capsule Take 1 capsule by mouth daily 30 capsule 5    atorvastatin (LIPITOR) 40 MG tablet Take 1 tablet by mouth daily 30 tablet 5    isosorbide mononitrate (IMDUR) 30 MG extended release tablet Take 1 tablet by mouth daily Take morning of surgery with a sip of water 30 tablet 5    Lactobacillus (PROBIOTIC ACIDOPHILUS PO) Take by mouth  docusate sodium (COLACE) 100 MG capsule Take 100 mg by mouth 2 times daily       nystatin (MYCOSTATIN) 351654 UNIT/GM powder Apply 3 times daily. 60 g 1     No current facility-administered medications for this visit. Social History     Socioeconomic History    Marital status:      Spouse name: Not on file    Number of children: Not on file    Years of education: Not on file    Highest education level: Not on file   Occupational History    Not on file   Social Needs    Financial resource strain: Patient refused    Food insecurity     Worry: Patient refused     Inability: Patient refused    Transportation needs     Medical: Patient refused     Non-medical: Patient refused   Tobacco Use    Smoking status: Former Smoker    Smokeless tobacco: Never Used    Tobacco comment: quit about 30 yrs ago   Substance and Sexual Activity    Alcohol use: No    Drug use: No    Sexual activity: Not on file   Lifestyle    Physical activity     Days per week: Not on file     Minutes per session: Not on file    Stress: Not on file   Relationships    Social connections     Talks on phone: Not on file     Gets together: Not on file     Attends Christianity service: Not on file     Active member of club or organization: Not on file     Attends meetings of clubs or organizations: Not on file     Relationship status: Not on file    Intimate partner violence     Fear of current or ex partner: Not on file     Emotionally abused: Not on file     Physically abused: Not on file     Forced sexual activity: Not on file   Other Topics Concern    Not on file   Social History Narrative    Not on file       Family History   Problem Relation Age of Onset    Breast Cancer Mother [de-identified]    Heart Disease Father     Cancer Sister         melanoma       Review of Systems:  Heart: as above   Lungs: as above   Eyes: denies changes in vision or discharge. Ears: denies changes in hearing or pain. Nose: denies epistaxis or masses   Throat: denies sore throat or trouble swallowing. Neuro: denies numbness, tingling, tremors. Skin: denies rashes or itching. : denies hematuria, dysuria   GI: denies vomiting, diarrhea   Psych: denies mood changed, anxiety, depression. All other systems negative. Physical Exam   There were no vitals taken for this visit. COVID 19    ASSESSMENT AND PLAN:  Patient Active Problem List   Diagnosis    [facility]) [facility]    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)    Essential hypertension    Hyperlipemia    AAA (abdominal aortic aneurysm) (HCC)    CAD (coronary artery disease)    T12 compression fracture (HCC)    Compression fracture of body of thoracic vertebra (HCC)    Anticoagulant long-term use    Chronic pain syndrome    Chronic, continuous use of opioids    Osteoarthrosis    Calculus of kidney     1. CHF: Volume good today. ACEI/lasix/imdur. 2. VHD: Moderate AI medically managed. 3. HTN: Observe. 4. Lipids: Statin. 5. Hx of arterial embolism: Warfarin    Shyanne Art D.O. Cardiologist  [facility]    Shereen Guy is a 80 y.o. female evaluated via telephone on 12/22/2020. Consent:  She and/or health care decision maker is aware that that she may receive a bill for this telephone service, depending on her insurance coverage, and has provided verbal consent to proceed: Yes      Documentation:  I communicated with the patient and/or health care decision maker about there cardiovascular issues, see above documentation. I affirm this is a Patient Initiated Episode with an Established Patient who has not had a related appointment within my department in the past 7 days or scheduled within the next 24 hours.     Total Time: minutes: 11-20 minutes    Note: not billable if this call serves to triage the patient into an appointment for the relevant concern Tony Climes

## 2020-12-28 ENCOUNTER — ANTI-COAG VISIT (OUTPATIENT)
Dept: FAMILY MEDICINE CLINIC | Age: 85
End: 2020-12-28
Payer: MEDICARE

## 2020-12-28 LAB — INR BLD: 2.2

## 2020-12-28 PROCEDURE — 93793 ANTICOAG MGMT PT WARFARIN: CPT | Performed by: INTERNAL MEDICINE

## 2021-01-08 ENCOUNTER — TELEPHONE (OUTPATIENT)
Dept: FAMILY MEDICINE CLINIC | Age: 86
End: 2021-01-08

## 2021-01-08 NOTE — TELEPHONE ENCOUNTER
Daughter, Viridiana Carr called to say that pt has not had her coumadin since 1/3/21(per daughter she has been busy and forgot) and no one called her about her INR that should have been done on Monday. She states that she will call the [facility] to see if the INR was done on Monday 1/4/21. Sending this to you to see if you had received any results. I did not see anything in the chart.

## 2021-01-08 NOTE — TELEPHONE ENCOUNTER
There is an encounter from 12/28. Her INR was 2.20. The nurse called me. I had her keep dose the same and repeat INR in 2 weeks. If she has not been taking her Coumadin for what ever reason get her back on it please. And get it rechecked as scheduled with the nurse.

## 2021-01-27 ENCOUNTER — OFFICE VISIT (OUTPATIENT)
Dept: FAMILY MEDICINE CLINIC | Age: 86
End: 2021-01-27
Payer: MEDICARE

## 2021-01-27 VITALS
WEIGHT: 132 LBS | BODY MASS INDEX: 23.39 KG/M2 | SYSTOLIC BLOOD PRESSURE: 108 MMHG | DIASTOLIC BLOOD PRESSURE: 62 MMHG | OXYGEN SATURATION: 90 % | HEIGHT: 63 IN | HEART RATE: 55 BPM | TEMPERATURE: 97.4 F

## 2021-01-27 DIAGNOSIS — G89.4 CHRONIC PAIN SYNDROME: ICD-10-CM

## 2021-01-27 DIAGNOSIS — I10 ESSENTIAL HYPERTENSION: ICD-10-CM

## 2021-01-27 DIAGNOSIS — I50.9 CONGESTIVE HEART FAILURE, UNSPECIFIED HF CHRONICITY, UNSPECIFIED HEART FAILURE TYPE (HCC): ICD-10-CM

## 2021-01-27 DIAGNOSIS — I74.9 ARTERIAL EMBOLISM (HCC): ICD-10-CM

## 2021-01-27 DIAGNOSIS — I71.40 ABDOMINAL AORTIC ANEURYSM (AAA) WITHOUT RUPTURE: ICD-10-CM

## 2021-01-27 DIAGNOSIS — R63.4 WEIGHT LOSS: ICD-10-CM

## 2021-01-27 DIAGNOSIS — F32.9 CURRENT EPISODE OF MAJOR DEPRESSIVE DISORDER WITHOUT PRIOR EPISODE, UNSPECIFIED DEPRESSION EPISODE SEVERITY: ICD-10-CM

## 2021-01-27 DIAGNOSIS — Z79.01 ANTICOAGULANT LONG-TERM USE: ICD-10-CM

## 2021-01-27 LAB
ALBUMIN SERPL-MCNC: 3.7 G/DL (ref 3.5–5.2)
ALP BLD-CCNC: 78 U/L (ref 35–104)
ALT SERPL-CCNC: 16 U/L (ref 0–32)
ANION GAP SERPL CALCULATED.3IONS-SCNC: 19 MMOL/L (ref 7–16)
AST SERPL-CCNC: 28 U/L (ref 0–31)
BASOPHILS ABSOLUTE: 0.03 E9/L (ref 0–0.2)
BASOPHILS RELATIVE PERCENT: 0.3 % (ref 0–2)
BILIRUB SERPL-MCNC: 0.5 MG/DL (ref 0–1.2)
BUN BLDV-MCNC: 34 MG/DL (ref 8–23)
CALCIUM SERPL-MCNC: 9.6 MG/DL (ref 8.6–10.2)
CHLORIDE BLD-SCNC: 106 MMOL/L (ref 98–107)
CHOLESTEROL, TOTAL: 171 MG/DL (ref 0–199)
CO2: 20 MMOL/L (ref 22–29)
CREAT SERPL-MCNC: 1.4 MG/DL (ref 0.5–1)
EOSINOPHILS ABSOLUTE: 0.03 E9/L (ref 0.05–0.5)
EOSINOPHILS RELATIVE PERCENT: 0.3 % (ref 0–6)
GFR AFRICAN AMERICAN: 43
GFR NON-AFRICAN AMERICAN: 35 ML/MIN/1.73
GLUCOSE BLD-MCNC: 180 MG/DL (ref 74–99)
HCT VFR BLD CALC: 51 % (ref 34–48)
HDLC SERPL-MCNC: 47 MG/DL
HEMOGLOBIN: 15.6 G/DL (ref 11.5–15.5)
IMMATURE GRANULOCYTES #: 0.03 E9/L
IMMATURE GRANULOCYTES %: 0.3 % (ref 0–5)
INTERNATIONAL NORMALIZATION RATIO, POC: 2.8
LDL CHOLESTEROL CALCULATED: 102 MG/DL (ref 0–99)
LYMPHOCYTES ABSOLUTE: 1.18 E9/L (ref 1.5–4)
LYMPHOCYTES RELATIVE PERCENT: 11.2 % (ref 20–42)
MCH RBC QN AUTO: 32.8 PG (ref 26–35)
MCHC RBC AUTO-ENTMCNC: 30.6 % (ref 32–34.5)
MCV RBC AUTO: 107.1 FL (ref 80–99.9)
MONOCYTES ABSOLUTE: 0.64 E9/L (ref 0.1–0.95)
MONOCYTES RELATIVE PERCENT: 6.1 % (ref 2–12)
NEUTROPHILS ABSOLUTE: 8.62 E9/L (ref 1.8–7.3)
NEUTROPHILS RELATIVE PERCENT: 81.8 % (ref 43–80)
PDW BLD-RTO: 13.5 FL (ref 11.5–15)
PLATELET # BLD: 209 E9/L (ref 130–450)
PMV BLD AUTO: 11.2 FL (ref 7–12)
POTASSIUM SERPL-SCNC: 4.9 MMOL/L (ref 3.5–5)
PROTHROMBIN TIME, POC: 34
RBC # BLD: 4.76 E12/L (ref 3.5–5.5)
SODIUM BLD-SCNC: 145 MMOL/L (ref 132–146)
TOTAL PROTEIN: 7.9 G/DL (ref 6.4–8.3)
TRIGL SERPL-MCNC: 112 MG/DL (ref 0–149)
VLDLC SERPL CALC-MCNC: 22 MG/DL
WBC # BLD: 10.5 E9/L (ref 4.5–11.5)

## 2021-01-27 PROCEDURE — 99214 OFFICE O/P EST MOD 30 MIN: CPT | Performed by: INTERNAL MEDICINE

## 2021-01-27 PROCEDURE — 1123F ACP DISCUSS/DSCN MKR DOCD: CPT | Performed by: INTERNAL MEDICINE

## 2021-01-27 PROCEDURE — 4040F PNEUMOC VAC/ADMIN/RCVD: CPT | Performed by: INTERNAL MEDICINE

## 2021-01-27 PROCEDURE — 1036F TOBACCO NON-USER: CPT | Performed by: INTERNAL MEDICINE

## 2021-01-27 PROCEDURE — G8427 DOCREV CUR MEDS BY ELIG CLIN: HCPCS | Performed by: INTERNAL MEDICINE

## 2021-01-27 PROCEDURE — 1090F PRES/ABSN URINE INCON ASSESS: CPT | Performed by: INTERNAL MEDICINE

## 2021-01-27 PROCEDURE — G8420 CALC BMI NORM PARAMETERS: HCPCS | Performed by: INTERNAL MEDICINE

## 2021-01-27 PROCEDURE — 85610 PROTHROMBIN TIME: CPT | Performed by: INTERNAL MEDICINE

## 2021-01-27 PROCEDURE — G8484 FLU IMMUNIZE NO ADMIN: HCPCS | Performed by: INTERNAL MEDICINE

## 2021-01-27 RX ORDER — WARFARIN SODIUM 5 MG/1
2.5 TABLET ORAL DAILY
Qty: 90 TABLET | Refills: 1 | Status: SHIPPED
Start: 2021-01-27 | End: 2021-02-19

## 2021-01-27 RX ORDER — LISINOPRIL 2.5 MG/1
2.5 TABLET ORAL DAILY
Qty: 90 TABLET | Refills: 1 | Status: SHIPPED
Start: 2021-01-27 | End: 2021-07-28 | Stop reason: SDUPTHER

## 2021-01-27 RX ORDER — ATORVASTATIN CALCIUM 40 MG/1
40 TABLET, FILM COATED ORAL DAILY
Qty: 90 TABLET | Refills: 1 | Status: SHIPPED
Start: 2021-01-27 | End: 2021-07-28 | Stop reason: SDUPTHER

## 2021-01-27 RX ORDER — OXYCODONE AND ACETAMINOPHEN 10; 325 MG/1; MG/1
1 TABLET ORAL EVERY 6 HOURS PRN
Qty: 90 TABLET | Refills: 0 | Status: SHIPPED
Start: 2021-01-27 | End: 2021-03-04 | Stop reason: SDUPTHER

## 2021-01-27 RX ORDER — OXYCODONE AND ACETAMINOPHEN 10; 325 MG/1; MG/1
1 TABLET ORAL EVERY 6 HOURS PRN
COMMUNITY
End: 2021-01-27 | Stop reason: SDUPTHER

## 2021-01-27 RX ORDER — FLUOXETINE 10 MG/1
10 CAPSULE ORAL DAILY
Qty: 90 CAPSULE | Refills: 1 | Status: SHIPPED
Start: 2021-01-27 | End: 2021-07-28 | Stop reason: SDUPTHER

## 2021-01-27 RX ORDER — ISOSORBIDE MONONITRATE 30 MG/1
30 TABLET, EXTENDED RELEASE ORAL DAILY
Qty: 90 TABLET | Refills: 1 | Status: SHIPPED
Start: 2021-01-27 | End: 2021-07-28 | Stop reason: SDUPTHER

## 2021-01-27 RX ORDER — POTASSIUM CHLORIDE 1500 MG/1
20 TABLET, FILM COATED, EXTENDED RELEASE ORAL 2 TIMES DAILY
Qty: 180 TABLET | Refills: 1 | Status: SHIPPED
Start: 2021-01-27 | End: 2021-07-28 | Stop reason: SDUPTHER

## 2021-01-27 RX ORDER — FUROSEMIDE 20 MG/1
20 TABLET ORAL 2 TIMES DAILY
Qty: 180 TABLET | Refills: 1 | Status: SHIPPED
Start: 2021-01-27 | End: 2021-07-28 | Stop reason: SDUPTHER

## 2021-01-27 ASSESSMENT — PATIENT HEALTH QUESTIONNAIRE - PHQ9
SUM OF ALL RESPONSES TO PHQ QUESTIONS 1-9: 0
SUM OF ALL RESPONSES TO PHQ9 QUESTIONS 1 & 2: 0
2. FEELING DOWN, DEPRESSED OR HOPELESS: 0

## 2021-01-27 NOTE — PROGRESS NOTES
HENT: Negative for congestion, ear pain, hearing loss, postnasal drip, rhinorrhea and sinus pain.    Respiratory: Positive for shortness of breath.-Stable negative for cough and wheezing.         COPD chronic oxygen use   Cardiovascular: Negative for chest pain, palpitations.  Positive for leg swelling.  -Improved post diuresis  Gastrointestinal: Positive for constipation.   Negative for abdominal pain, blood in stool, diarrhea,  and vomiting.         Endocrine: Negative.    Genitourinary: Negative for difficulty urinating, dysuria, hematuria and urgency.         Reports frequent UTIs .does have some incontinence of urine following with urology. Chayo Renteria hematuria has been worked up.   Musculoskeletal: Positive for arthralgias.  back pain, and gait problem       Chronic pain syndrome/back pain--lower back pain and bilateral hip pain/chronic-continues on narcotic pain medication with success. T11-L1 vertebroplasty. Successful.   Skin: Negative.    Neurological: Negative for dizziness, weakness, light-headedness, numbness and headaches. Hematological: Negative.    Psychiatric/Behavioral: Admits to being more emotional and angry since taken off of her Prozac.-Placed back on again with improvement  Does appear to be in much better spirits today. REST OF PERTINENT ROS GONE OVER AND WAS NEGATIVE.        Past medical history:  Problem List: Chronic pain syndrome, Long-term current use of anticoagulant, Anticoagulant, Osteoarthritis, Taking  medication, Hyperlipidemia  Health Maintenance:  Mammogram - (1/23/2019)  Mammogram Screening - (1/23/2019)  Influenza Vaccination - (11/21/2018)  Bone Density Scan - (1/11/2011)  Colonoscopy - (11/12/2015)  Couseled on Home Safety - (4/12/2017)  Bone Density Test Screening - (5/23/2014)  Stress Test - 1/08-ok,3/11  EKG - 7/09,11,3/13  Rectal Exam - 8/10  Breast Exam - 8/10,declined,2/14  Hemmocult Cards - 2/13-neg x 3  2D ECHO - 8/15  Mini Mental Status - 4/19--29/30 Medical Problems:  Osteoarthritis  DVT - right-  Hypertension  Lumbar Spondylosis-Herniated Disc-Spinal Stenosis - epidurals/[place]  Hyperlipidemia, YAIR-BSO--Non Cancerous  Small AAA - 3.4cm--- .--3.5 cm per CT urogram, CT abdomen-920  HX FX Left Ankle, Chronic Greater Trochanteric Bursitis, Depression, Cataracts  Coronary Artery Disease (CAD) - Previously declined cardiology fu  neurogenic claudication  Spinal Stenosis - L2-5 lumbar laminectomy-dr massey,[facility]  Kidney Stones, Anxiety, Diverticulitis, Osteoporosis, post laminectomy syndrome lumbar spine  Goiter - multinodular  hematuria - cystoscopy/retrograde-worked up by urology. urethral stenosis - dialated  chronic urethral inflammation, strep bovis bacteremia, Congestive Heart Failure (CHF), arterial thrombosis right arm  T12 compression fracture. - Vertebroplasty  Lumbar Spondylosis-Herniated Disc-Spinal Stenosis  T11 and L1 kyphoplasty -   Dilated nonischemic cardiomyopathy - Follows with cardiology  Copd  Hospitalization for congestive heart failure volume overload, ascites-  Cirrhosis per CAT scan  Aortic regurgitation-moderate  Pulmonary hypertension    Reviewed and updated. FH:  Father:  MI -  late 63's. Mother:  Breast Cancer - ? ?  AGE 80. Son 1:  Chronic Obstructive Pulmonary Disease (COPD) -  age 61. Sister 1:  melanoma -  age in her 52's. Reviewed, no changes. SH:  Marital: . Personal Habits: Cigarette Use: Does Not Smoke. Alcohol: does not use alcohol.   Reviewed, no changes.                Current Outpatient Medications:     warfarin (COUMADIN) 5 MG tablet, Take 0.5 tablets by mouth daily Sundays only, Disp: 90 tablet, Rfl: 1    atorvastatin (LIPITOR) 40 MG tablet, Take 1 tablet by mouth daily, Disp: 90 tablet, Rfl: 1    furosemide (LASIX) 20 MG tablet, Take 1 tablet by mouth 2 times daily, Disp: 180 tablet, Rfl: 1   FLUoxetine (PROZAC) 10 MG capsule, Take 1 capsule by mouth daily, Disp: 90 capsule, Rfl: 1    isosorbide mononitrate (IMDUR) 30 MG extended release tablet, Take 1 tablet by mouth daily Take morning of surgery with a sip of water, Disp: 90 tablet, Rfl: 1    lisinopril (ZESTRIL) 2.5 MG tablet, Take 1 tablet by mouth daily, Disp: 90 tablet, Rfl: 1    potassium chloride (KLOR-CON M) 20 MEQ TBCR extended release tablet, Take 1 tablet by mouth 2 times daily, Disp: 180 tablet, Rfl: 1    oxyCODONE-acetaminophen (PERCOCET)  MG per tablet, Take 1 tablet by mouth every 6 hours as needed for Pain for up to 30 days. , Disp: 90 tablet, Rfl: 0    budesonide (PULMICORT) 0.5 MG/2ML nebulizer suspension, INHALE THE CONTENTS OF ONE VIAL (2ML) VIA NEBULIZER TWO TIMES A DAY, Disp: , Rfl:     ipratropium-albuterol (DUONEB) 0.5-2.5 (3) MG/3ML SOLN nebulizer solution, INHALE THE CONTENTS OF 1 VIAL (3ML) VIA NEBULIZER 3 TIMES A DAY AND AS NEEDED FOR DYSPNEA  COUGH OR WHEEZING, Disp: , Rfl:     Cyanocobalamin (VITAMIN B-12) 5000 MCG TBDP, Take 5,000 mcg by mouth daily , Disp: , Rfl:     warfarin (COUMADIN) 1 MG tablet, Take 1 tablet by mouth daily (Patient taking differently: Take 5 mg by mouth daily Monday thru Saturday), Disp: 30 tablet, Rfl: 5    Lactobacillus (PROBIOTIC ACIDOPHILUS PO), Take by mouth, Disp: , Rfl:     docusate sodium (COLACE) 100 MG capsule, Take 100 mg by mouth 2 times daily , Disp: , Rfl:     nystatin (MYCOSTATIN) 280311 UNIT/GM powder, Apply 3 times daily. , Disp: 60 g, Rfl: 1  No Known Allergies    Past Medical History:   Diagnosis Date    AAA (abdominal aortic aneurysm) (HCC)     Anticoagulant long-term use 5/29/2019    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)     CAD (coronary artery disease)     CHF (congestive heart failure) (HCC)     Chronic pain syndrome 5/29/2019    HTN (hypertension)     Hx of blood clots 2015    right upper arm    Hyperlipemia Past Surgical History:   Procedure Laterality Date    BACK SURGERY  2010    [facility]atic[facility], pt unsure    COLONOSCOPY      EYE SURGERY Bilateral     cataracts    FIXATION KYPHOPLASTY  10/02/2017    T12 kyphoplasty Dr Laura Voss KYPHOPLASTY  11/20/2017    T11-L1 kypho    HYSTERECTOMY      HYSTERECTOMY, TOTAL ABDOMINAL      TONSILLECTOMY      TRANSESOPHAGEAL ECHOCARDIOGRAM  11/04/15     Family History   Problem Relation Age of Onset    Breast Cancer Mother [de-identified]    Heart Disease Father     Cancer Sister         melanoma     Social History     Socioeconomic History    Marital status:      Spouse name: Not on file    Number of children: Not on file    Years of education: Not on file    Highest education level: Not on file   Occupational History    Not on file   Social Needs    Financial resource strain: Patient refused    Food insecurity     Worry: Patient refused     Inability: Patient refused    Transportation needs     Medical: Patient refused     Non-medical: Patient refused   Tobacco Use    Smoking status: Former Smoker    Smokeless tobacco: Never Used    Tobacco comment: quit about 30 yrs ago   Substance and Sexual Activity    Alcohol use: No    Drug use: No    Sexual activity: Not on file   Lifestyle    Physical activity     Days per week: Not on file     Minutes per session: Not on file    Stress: Not on file   Relationships    Social connections     Talks on phone: Not on file     Gets together: Not on file     Attends Yazidism service: Not on file     Active member of club or organization: Not on file     Attends meetings of clubs or organizations: Not on file     Relationship status: Not on file    Intimate partner violence     Fear of current or ex partner: Not on file     Emotionally abused: Not on file     Physically abused: Not on file     Forced sexual activity: Not on file   Other Topics Concern    Not on file   Social History Narrative    Not on file

## 2021-01-28 ENCOUNTER — TELEPHONE (OUTPATIENT)
Dept: FAMILY MEDICINE CLINIC | Age: 86
End: 2021-01-28

## 2021-01-28 NOTE — TELEPHONE ENCOUNTER
Pt's daughter notified of lab results and the decrease of lasix and potassium. . repeat bmp next visit

## 2021-02-01 ENCOUNTER — ANTI-COAG VISIT (OUTPATIENT)
Dept: FAMILY MEDICINE CLINIC | Age: 86
End: 2021-02-01
Payer: MEDICARE

## 2021-02-01 DIAGNOSIS — I71.40 ABDOMINAL AORTIC ANEURYSM (AAA) WITHOUT RUPTURE: ICD-10-CM

## 2021-02-01 LAB — INR BLD: 2.9

## 2021-02-01 PROCEDURE — 93793 ANTICOAG MGMT PT WARFARIN: CPT | Performed by: INTERNAL MEDICINE

## 2021-02-01 NOTE — PROGRESS NOTES
Call from home health care with INR of 2.9. Patient having no bleeding or thrombotic episodes. I instructed them to keep same dose and recheck INR in 2 weeks.

## 2021-02-08 RX ORDER — WARFARIN SODIUM 1 MG/1
TABLET ORAL
Qty: 90 TABLET | Refills: 3 | OUTPATIENT
Start: 2021-02-08

## 2021-02-08 NOTE — TELEPHONE ENCOUNTER
Last Appointment:  1/27/2021  Future Appointments   Date Time Provider Roman Degroot   4/28/2021  1:15 PM Fadumo Maloney MD [facility]      Daughter Bbo Lu calling for new rx on warfarin to be sent to [address] [place] [address] In correct sig was sent the first time.  rx pended

## 2021-02-15 ENCOUNTER — ANTI-COAG VISIT (OUTPATIENT)
Dept: FAMILY MEDICINE CLINIC | Age: 86
End: 2021-02-15

## 2021-02-15 LAB — INR BLD: 3.5

## 2021-02-19 ENCOUNTER — TELEPHONE (OUTPATIENT)
Dept: FAMILY MEDICINE CLINIC | Age: 86
End: 2021-02-19

## 2021-02-19 RX ORDER — WARFARIN SODIUM 5 MG/1
5 TABLET ORAL
Status: ON HOLD | COMMUNITY
End: 2021-05-18 | Stop reason: HOSPADM

## 2021-03-04 ENCOUNTER — VIRTUAL VISIT (OUTPATIENT)
Dept: FAMILY MEDICINE CLINIC | Age: 86
End: 2021-03-04
Payer: MEDICARE

## 2021-03-04 DIAGNOSIS — I25.10 CORONARY ARTERY DISEASE INVOLVING NATIVE CORONARY ARTERY OF NATIVE HEART WITHOUT ANGINA PECTORIS: ICD-10-CM

## 2021-03-04 DIAGNOSIS — Z79.01 ANTICOAGULANT LONG-TERM USE: ICD-10-CM

## 2021-03-04 DIAGNOSIS — I48.0 PAF (PAROXYSMAL ATRIAL FIBRILLATION) (HCC): ICD-10-CM

## 2021-03-04 DIAGNOSIS — F11.90 CHRONIC, CONTINUOUS USE OF OPIOIDS: ICD-10-CM

## 2021-03-04 DIAGNOSIS — G89.4 CHRONIC PAIN SYNDROME: ICD-10-CM

## 2021-03-04 DIAGNOSIS — I10 ESSENTIAL HYPERTENSION: ICD-10-CM

## 2021-03-04 DIAGNOSIS — D75.89 MACROCYTOSIS: ICD-10-CM

## 2021-03-04 DIAGNOSIS — G25.81 RESTLESS LEG: ICD-10-CM

## 2021-03-04 PROCEDURE — G8427 DOCREV CUR MEDS BY ELIG CLIN: HCPCS | Performed by: INTERNAL MEDICINE

## 2021-03-04 PROCEDURE — 4040F PNEUMOC VAC/ADMIN/RCVD: CPT | Performed by: INTERNAL MEDICINE

## 2021-03-04 PROCEDURE — G8484 FLU IMMUNIZE NO ADMIN: HCPCS | Performed by: INTERNAL MEDICINE

## 2021-03-04 PROCEDURE — 99214 OFFICE O/P EST MOD 30 MIN: CPT | Performed by: INTERNAL MEDICINE

## 2021-03-04 PROCEDURE — G8420 CALC BMI NORM PARAMETERS: HCPCS | Performed by: INTERNAL MEDICINE

## 2021-03-04 PROCEDURE — 1090F PRES/ABSN URINE INCON ASSESS: CPT | Performed by: INTERNAL MEDICINE

## 2021-03-04 PROCEDURE — 1036F TOBACCO NON-USER: CPT | Performed by: INTERNAL MEDICINE

## 2021-03-04 PROCEDURE — 1123F ACP DISCUSS/DSCN MKR DOCD: CPT | Performed by: INTERNAL MEDICINE

## 2021-03-04 RX ORDER — OXYCODONE AND ACETAMINOPHEN 10; 325 MG/1; MG/1
1 TABLET ORAL EVERY 8 HOURS PRN
Qty: 90 TABLET | Refills: 0 | Status: SHIPPED
Start: 2021-03-04 | End: 2021-03-31 | Stop reason: SDUPTHER

## 2021-03-04 NOTE — PROGRESS NOTES
TeleMedicine Video Visit    Chase Camarena, was evaluated through a synchronous (real-time) audio-video encounter. The patient (or guardian if applicable) is aware that this is a billable service. Verbal consent to proceed has been obtained within the past 12 months. The visit was conducted pursuant to the emergency declaration under the [facility], [address] [place] [address] authority and the [employer] Act. Patient identification was verified, and a caregiver was present when appropriate. The patient was located in a state where the provider was credentialed to provide care. Patient identification was verified at the start of the visit, including the patient's telephone number and physical location. I discussed with the patient the nature of our telehealth visits, that:     1. Due to the nature of an audio- video modality, the only components of a physical exam that could be done are the elements supported by direct observation. 2. I would evaluate the patient and recommend diagnostics and treatments based on my assessment. 3. If it was felt that the patient should be evaluated in clinic or an emergency room setting, then they would be directed there. 4. Our sessions are not being recorded and that personal health information is protected. 5. Our team would provide follow up care in person if/when the patient needs it. Patient's location: home address in [facility]  Physician  location Office address [place] in [place] other people involved in call  --- daughter    Not billed by time    This visit was completed virtually using [employer]. [facility]anna [facility]     3/4/21  Chase Camarena : 3/1/1932 Sex: female  Age: 80 y.o.     Chief Complaint   Patient presents with    Other     refills, restless legs unable to sleep       HPI Patient encounter today via virtual video visit secondary to ongoing COVID-19 pandemic status. Reviewed last note which was an in office visit. Patient is requesting her narcotic refill that she gets monthly and chronically no evidence of abuse or diversion of the medication. OARRS report was run and okay. States this does make the pain tolerable and allows her to get around a little bit. New complaint today is that of restless leg type symptoms over the past 3 weeks or so. States she cannot keep her legs still in the evening. She denies any significant pain. Denies any significant swelling. The edema in the legs has gone down. Tells me blood pressures been in a good range. She is not having any bleeding on the Coumadin with her last INR 5.1. I did make adjustments through the visiting nurse will recheck numbers again on Monday. She is on the Coumadin for PAF and DVT/arterial embolism. COPD appears to be stable. She is only using her nebulizer and oxygen as needed. I told her she should be using the nebulizer on a more regular basis. They do have a pulse ox at home and she has been saturating okay. Patient is following with urology, podiatry, renal, cardiology, pulmonary and did see hematology/oncology because of macrocytosis.       Review of Systems     Constitutional:  negative for activity change, chills, diaphoresis,  and fever.  Positive for fatigue  HENT: Negative for congestion, ear pain, hearing loss, postnasal drip, rhinorrhea and sinus pain.    Respiratory: Positive for shortness of breath.-Stable negative for cough and wheezing.         COPD chronic oxygen use   Cardiovascular: Negative for chest pain, palpitations.  Positive for leg swelling.  -Improved post diuresis  Gastrointestinal: Positive for constipation.   Negative for abdominal pain, blood in stool, diarrhea,  and vomiting.         Endocrine: Negative.   Genitourinary: Negative for difficulty urinating, dysuria, hematuria and urgency.         Reports frequent UTIs .does have some incontinence of urine following with urology. Kellen Spotted hematuria has been worked up.   Musculoskeletal: Positive for arthralgias.  back pain, and gait problem       Chronic pain syndrome/back pain--lower back pain and bilateral hip pain/chronic-continues on narcotic pain medication with success. T11-L1 vertebroplasty. Successful.   New restless leg type symptoms as noted above. Skin: Negative.    Neurological: Negative for dizziness, weakness, light-headedness, numbness and headaches. Hematological: Negative.    Psychiatric/Behavioral: Admits to being more emotional and angry since taken off of her Prozac.-Placed back on again with improvement  Does appear to be in much better spirits today. REST OF PERTINENT ROS GONE OVER AND WAS NEGATIVE.      Past medical history:  Problem List: Chronic pain syndrome, Long-term current use of anticoagulant, Anticoagulant, Osteoarthritis, Taking  medication, Hyperlipidemia  Health Maintenance:  Mammogram - (1/23/2019)  Mammogram Screening - (1/23/2019)  Influenza Vaccination - (11/21/2018)  Bone Density Scan - (1/11/2011)  Colonoscopy - (11/12/2015)  Couseled on Home Safety - (4/12/2017)  Bone Density Test Screening - (5/23/2014)  Stress Test - 1/08-ok,3/11  EKG - 7/09,11,3/13  Rectal Exam - 8/10  Breast Exam - 8/10,declined,2/14  Hemmocult Cards - 2/13-neg x 3  2D ECHO - 8/15  Mini Mental Status - 4/19--29/30  Medical Problems:  Osteoarthritis  DVT - right-1/04  Hypertension  Lumbar Spondylosis-Herniated Disc-Spinal Stenosis - epidurals/[place]  Hyperlipidemia, YAIR-BSO--Non Cancerous  Small AAA - 3.4cm--- 2/19.--3.5 cm per CT urogram, CT abdomen-920  HX FX Left Ankle, Chronic Greater Trochanteric Bursitis, Depression, Cataracts  Coronary Artery Disease (CAD) - Previously declined cardiology fu  neurogenic claudication Spinal Stenosis - L2-5 lumbar laminectomy-dr [facility]sey,[facility]  Kidney Stones, Anxiety, Diverticulitis, Osteoporosis, post laminectomy syndrome lumbar spine  Goiter - multinodular  hematuria - cystoscopy/retrograde-worked up by urology. urethral stenosis - dialated  chronic urethral inflammation, strep bovis bacteremia, Congestive Heart Failure (CHF), arterial thrombosis right arm  T12 compression fracture. - Vertebroplasty  Lumbar Spondylosis-Herniated Disc-Spinal Stenosis  T11 and L1 kyphoplasty -   Dilated nonischemic cardiomyopathy - Follows with cardiology  Copd  Hospitalization for congestive heart failure volume overload, ascites-  Cirrhosis per CAT scan  Aortic regurgitation-moderate  Pulmonary hypertension  Restless leg syndrome 3/21     Reviewed and updated. FH:  Father:  MI -  late 63's. Mother:  Breast Cancer - ? ?  AGE 80. Son 1:  Chronic Obstructive Pulmonary Disease (COPD) -  age 61. Sister 1:  melanoma -  age in her 52's. Reviewed, no changes. SH:  Marital: . Personal Habits: Cigarette Use: Does Not Smoke. Alcohol: does not use alcohol. Reviewed, no changes.                  Current Outpatient Medications:     oxyCODONE-acetaminophen (PERCOCET)  MG per tablet, Take 1 tablet by mouth every 8 hours as needed for Pain for up to 30 days. , Disp: 90 tablet, Rfl: 0    warfarin (COUMADIN) 5 MG tablet, Take 5 mg by mouth 2.5 mg Sat/Sun and 5mg all other days, Disp: , Rfl:     atorvastatin (LIPITOR) 40 MG tablet, Take 1 tablet by mouth daily, Disp: 90 tablet, Rfl: 1    furosemide (LASIX) 20 MG tablet, Take 1 tablet by mouth 2 times daily (Patient taking differently: Take 20 mg by mouth daily ), Disp: 180 tablet, Rfl: 1    FLUoxetine (PROZAC) 10 MG capsule, Take 1 capsule by mouth daily, Disp: 90 capsule, Rfl: 1  TRANSESOPHAGEAL ECHOCARDIOGRAM  11/04/15     Family History   Problem Relation Age of Onset    Breast Cancer Mother [de-identified]    Heart Disease Father     Cancer Sister         melanoma     Social History     Socioeconomic History    Marital status:      Spouse name: Not on file    Number of children: Not on file    Years of education: Not on file    Highest education level: Not on file   Occupational History    Not on file   Social Needs    Financial resource strain: Patient refused    Food insecurity     Worry: Patient refused     Inability: Patient refused    Transportation needs     Medical: Patient refused     Non-medical: Patient refused   Tobacco Use    Smoking status: Former Smoker    Smokeless tobacco: Never Used    Tobacco comment: quit about 30 yrs ago   Substance and Sexual Activity    Alcohol use: No    Drug use: No    Sexual activity: Not on file   Lifestyle    Physical activity     Days per week: Not on file     Minutes per session: Not on file    Stress: Not on file   Relationships    Social connections     Talks on phone: Not on file     Gets together: Not on file     Attends [employer] service: Not on file     Active member of club or organization: Not on file     Attends meetings of clubs or organizations: Not on file     Relationship status: Not on file    Intimate partner violence     Fear of current or ex partner: Not on file     Emotionally abused: Not on file     Physically abused: Not on file     Forced sexual activity: Not on file   Other Topics Concern    Not on file   Social History Narrative    Not on file       There were no vitals filed for this visit. Physical Exam  Nursing note reviewed. Constitutional:       General: She is not in acute distress. HENT:      Head: Normocephalic and atraumatic. Neck:      Musculoskeletal: Normal range of motion.    Pulmonary:      Effort: Pulmonary effort is normal.   Musculoskeletal: Comments: I was able to look at her legs. She does have mild amount of edema although much better than has been in the past.  Left leg greater in size than right which has been chronic   Neurological:      Mental Status: She is alert and oriented to person, place, and time. Psychiatric:         Mood and Affect: Mood normal.         Behavior: Behavior normal.         Thought Content: Thought content normal.         Judgment: Judgment normal.                 Assessment and Plan:  Samanta Castillo was seen today for other. Diagnoses and all orders for this visit:    Chronic pain syndrome  -     oxyCODONE-acetaminophen (PERCOCET)  MG per tablet; Take 1 tablet by mouth every 8 hours as needed for Pain for up to 30 days. Stable-continues on her Percocet without evidence of abuse. Essential hypertension  -     Basic Metabolic Panel; Future  Stable on medication    Chronic, continuous use of opioids    Restless leg  -     IRON AND TIBC; Future  -     FERRITIN; Future  Initiation of Requip    Anticoagulant long-term use  Monitoring INRs    PAF (paroxysmal atrial fibrillation) (HCC)  Remains in sinus    Macrocytosis  Stable-  has seen hematology/oncology in the past    Coronary artery disease involving native coronary artery of native heart without angina pectoris  Stable    Plan: I will do another virtual video visit in 1 month. .  We will continue to monitor Coumadin/INR through visiting nurse on a weekly basis. We will send over Requip 0.25 mg couple hours before bedtime and see how she does with this. Also check iron levels as a potential source for this restless leg. We will check a BMP related to her renal function as well. I encouraged her to get back on her nebulizer. Fall precautions. Notify us with problems in the interim. Regular follow-up visit in 2 months. Return in about 1 month (around 4/4/2021) for 1 month narcotic visit, 2 month reg fu.     Seen By:  Aneudy Mueller MD *Document was created using voice recognition software. Note was reviewed however may contain grammatical errors.

## 2021-03-05 ENCOUNTER — TELEPHONE (OUTPATIENT)
Dept: FAMILY MEDICINE CLINIC | Age: 86
End: 2021-03-05

## 2021-03-05 RX ORDER — ROPINIROLE 0.25 MG/1
TABLET, FILM COATED ORAL
Qty: 90 TABLET | Refills: 1 | Status: SHIPPED
Start: 2021-03-05 | End: 2021-11-24 | Stop reason: ALTCHOICE

## 2021-03-05 NOTE — TELEPHONE ENCOUNTER
Sister, Daniel Koko called asking if you could please send a script over for the Requip that was discussed at pt's appointment. Kay Buck.

## 2021-03-08 ENCOUNTER — TELEPHONE (OUTPATIENT)
Dept: FAMILY MEDICINE CLINIC | Age: 86
End: 2021-03-08

## 2021-03-08 LAB
BUN BLDV-MCNC: 22 MG/DL (ref 7–24)
CALCIUM SERPL-MCNC: 9.3 MG/DL (ref 8.5–10.5)
CHLORIDE BLD-SCNC: 108 MMOL/L (ref 98–107)
CO2: 26 MMOL/L (ref 21–32)
CREAT SERPL-MCNC: 1.12 MG/DL (ref 0.55–1.02)
FERRITIN: 70.5 NG/ML (ref 10–291)
GFR AFRICAN AMERICAN: 56 ML/MIN
GFR SERPL CREATININE-BSD FRML MDRD: 46 ML/MIN/
GLUCOSE: 74 MG/DL (ref 65–99)
IRON SATURATION: 21 %
IRON: 77 UG/DL (ref 50–170)
POTASSIUM SERPL-SCNC: 5 MMOL/L (ref 3.5–5.1)
SODIUM BLD-SCNC: 138 MMOL/L (ref 136–145)
TOTAL IRON BINDING CAPACITY: 361 UG/DL (ref 250–450)
UNSATURATED IRON BINDING CAPACITY: 284 UG/DL (ref 110–365)

## 2021-03-26 ENCOUNTER — TELEPHONE (OUTPATIENT)
Dept: FAMILY MEDICINE CLINIC | Age: 86
End: 2021-03-26

## 2021-03-26 ENCOUNTER — HOSPITAL ENCOUNTER (INPATIENT)
Dept: HOSPITAL 83 - ED | Age: 86
LOS: 3 days | Discharge: HOME HEALTH | DRG: 682 | End: 2021-03-29
Attending: STUDENT IN AN ORGANIZED HEALTH CARE EDUCATION/TRAINING PROGRAM | Admitting: STUDENT IN AN ORGANIZED HEALTH CARE EDUCATION/TRAINING PROGRAM
Payer: MEDICARE

## 2021-03-26 VITALS — WEIGHT: 141.13 LBS | HEIGHT: 65 IN | BODY MASS INDEX: 23.52 KG/M2

## 2021-03-26 VITALS — SYSTOLIC BLOOD PRESSURE: 115 MMHG | DIASTOLIC BLOOD PRESSURE: 68 MMHG

## 2021-03-26 VITALS — DIASTOLIC BLOOD PRESSURE: 52 MMHG

## 2021-03-26 VITALS — SYSTOLIC BLOOD PRESSURE: 135 MMHG | DIASTOLIC BLOOD PRESSURE: 61 MMHG

## 2021-03-26 VITALS — DIASTOLIC BLOOD PRESSURE: 68 MMHG | SYSTOLIC BLOOD PRESSURE: 115 MMHG

## 2021-03-26 VITALS — SYSTOLIC BLOOD PRESSURE: 141 MMHG | DIASTOLIC BLOOD PRESSURE: 70 MMHG

## 2021-03-26 DIAGNOSIS — J96.11: ICD-10-CM

## 2021-03-26 DIAGNOSIS — I11.0: ICD-10-CM

## 2021-03-26 DIAGNOSIS — Z86.718: ICD-10-CM

## 2021-03-26 DIAGNOSIS — I50.33: ICD-10-CM

## 2021-03-26 DIAGNOSIS — F32.9: ICD-10-CM

## 2021-03-26 DIAGNOSIS — Z90.710: ICD-10-CM

## 2021-03-26 DIAGNOSIS — Z79.1: ICD-10-CM

## 2021-03-26 DIAGNOSIS — I08.3: ICD-10-CM

## 2021-03-26 DIAGNOSIS — N17.0: Primary | ICD-10-CM

## 2021-03-26 DIAGNOSIS — Z79.01: ICD-10-CM

## 2021-03-26 DIAGNOSIS — E78.5: ICD-10-CM

## 2021-03-26 DIAGNOSIS — Z82.49: ICD-10-CM

## 2021-03-26 DIAGNOSIS — Z99.81: ICD-10-CM

## 2021-03-26 DIAGNOSIS — Z80.3: ICD-10-CM

## 2021-03-26 DIAGNOSIS — I50.812: ICD-10-CM

## 2021-03-26 DIAGNOSIS — Z79.899: ICD-10-CM

## 2021-03-26 DIAGNOSIS — Z91.14: ICD-10-CM

## 2021-03-26 DIAGNOSIS — I27.20: ICD-10-CM

## 2021-03-26 DIAGNOSIS — E43: ICD-10-CM

## 2021-03-26 DIAGNOSIS — K74.60: ICD-10-CM

## 2021-03-26 DIAGNOSIS — J84.10: ICD-10-CM

## 2021-03-26 LAB
ALBUMIN SERPL-MCNC: 3.1 GM/DL (ref 3.1–4.5)
ALP SERPL-CCNC: 82 U/L (ref 45–117)
ALT SERPL W P-5'-P-CCNC: 14 U/L (ref 12–78)
AST SERPL-CCNC: 19 IU/L (ref 3–35)
BILIRUB UR QL STRIP: (no result)
BUN SERPL-MCNC: 11 MG/DL (ref 7–24)
CHLORIDE SERPL-SCNC: 110 MMOL/L (ref 98–107)
CREAT SERPL-MCNC: 0.98 MG/DL (ref 0.55–1.02)
EPI CELLS #/AREA URNS HPF: (no result) /[HPF]
ERYTHROCYTE [DISTWIDTH] IN BLOOD BY AUTOMATED COUNT: 15.6 % (ref 0–14.5)
HCT VFR BLD AUTO: 47.8 % (ref 37–47)
INR BLD: 1.8 (ref 2–3.5)
LEUKOCYTE ESTERASE UR QL STRIP: (no result)
MACROCYTES BLD QL SMEAR: (no result)
MCH RBC QN AUTO: 31.9 PG (ref 27–31)
MCHC RBC AUTO-ENTMCNC: 29.3 G/DL (ref 33–37)
MCV RBC AUTO: 108.9 FL (ref 81–99)
NRBC BLD QL AUTO: 0 10*3/UL (ref 0–0)
PH UR STRIP: 5.5 [PH] (ref 4.5–8)
PLATELET # BLD AUTO: 203 10*3/UL (ref 130–400)
PLATELET SUFFICIENCY: NORMAL
PMV BLD AUTO: 10.5 FL (ref 9.6–12.3)
POTASSIUM SERPL-SCNC: 4.1 MMOL/L (ref 3.5–5.1)
PROT SERPL-MCNC: 7.3 GM/DL (ref 6.4–8.2)
RBC # BLD AUTO: 4.39 10*6/UL (ref 4.1–5.1)
RBC #/AREA URNS HPF: (no result) RBC/HPF (ref 0–2)
SCHISTOCYTES BLD QL SMEAR: (no result)
SODIUM SERPL-SCNC: 143 MMOL/L (ref 136–145)
SP GR UR: 1.02 (ref 1–1.03)
TOTAL CELLS COUNTED: 100 #CELLS
TROPONIN I SERPL-MCNC: < 0.015 NG/ML (ref ?–0.04)
UROBILINOGEN UR STRIP-MCNC: 4 E.U./DL (ref 0–1)
WBC #/AREA URNS HPF: (no result) WBC/HPF (ref 0–5)
WBC NRBC COR # BLD AUTO: 7.4 10*3/UL (ref 4.8–10.8)

## 2021-03-27 VITALS — SYSTOLIC BLOOD PRESSURE: 124 MMHG | DIASTOLIC BLOOD PRESSURE: 64 MMHG

## 2021-03-27 VITALS — DIASTOLIC BLOOD PRESSURE: 60 MMHG | SYSTOLIC BLOOD PRESSURE: 132 MMHG

## 2021-03-27 VITALS — SYSTOLIC BLOOD PRESSURE: 117 MMHG | DIASTOLIC BLOOD PRESSURE: 66 MMHG

## 2021-03-27 VITALS — DIASTOLIC BLOOD PRESSURE: 64 MMHG

## 2021-03-27 LAB
ALBUMIN SERPL-MCNC: 2.5 GM/DL (ref 3.1–4.5)
ALP SERPL-CCNC: 66 U/L (ref 45–117)
ALT SERPL W P-5'-P-CCNC: 11 U/L (ref 12–78)
APTT PPP: 34.5 SECONDS (ref 20–32.1)
AST SERPL-CCNC: 13 IU/L (ref 3–35)
BASOPHILS # BLD AUTO: 0 10*3/UL (ref 0–0.1)
BASOPHILS NFR BLD AUTO: 0.7 % (ref 0–1)
BUN SERPL-MCNC: 15 MG/DL (ref 7–24)
CHLORIDE SERPL-SCNC: 109 MMOL/L (ref 98–107)
CHOLEST SERPL-MCNC: 89 MG/DL (ref ?–200)
CREAT SERPL-MCNC: 0.92 MG/DL (ref 0.55–1.02)
EOSINOPHIL # BLD AUTO: 0.1 10*3/UL (ref 0–0.4)
EOSINOPHIL # BLD AUTO: 2.2 % (ref 1–4)
ERYTHROCYTE [DISTWIDTH] IN BLOOD BY AUTOMATED COUNT: 15.6 % (ref 0–14.5)
HCT VFR BLD AUTO: 41.2 % (ref 37–47)
HDLC SERPL-MCNC: 38 MG/DL (ref 40–60)
INR BLD: 2 (ref 2–3.5)
LDLC SERPL DIRECT ASSAY-MCNC: 40 MG/DL (ref 9–159)
LYMPHOCYTES # BLD AUTO: 1.3 10*3/UL (ref 1.3–4.4)
LYMPHOCYTES NFR BLD AUTO: 23.4 % (ref 27–41)
MCH RBC QN AUTO: 31.9 PG (ref 27–31)
MCHC RBC AUTO-ENTMCNC: 29.9 G/DL (ref 33–37)
MCV RBC AUTO: 107 FL (ref 81–99)
MONOCYTES # BLD AUTO: 0.7 10*3/UL (ref 0.1–1)
MONOCYTES NFR BLD MANUAL: 13.3 % (ref 3–9)
NEUT #: 3.3 10*3/UL (ref 2.3–7.9)
NEUT %: 60.2 % (ref 47–73)
NRBC BLD QL AUTO: 0 10*3/UL (ref 0–0)
PLATELET # BLD AUTO: 170 10*3/UL (ref 130–400)
PMV BLD AUTO: 10.5 FL (ref 9.6–12.3)
POTASSIUM SERPL-SCNC: 3.8 MMOL/L (ref 3.5–5.1)
PROT SERPL-MCNC: 6.2 GM/DL (ref 6.4–8.2)
RBC # BLD AUTO: 3.85 10*6/UL (ref 4.1–5.1)
SODIUM SERPL-SCNC: 142 MMOL/L (ref 136–145)
T4 FREE SERPL-MCNC: 0.98 NG/DL (ref 0.76–1.46)
TRIGL SERPL-MCNC: 56 MG/DL (ref ?–150)
TSH SERPL DL<=0.005 MIU/L-ACNC: 2.39 UIU/ML (ref 0.36–4.75)
VLDLC SERPL CALC-MCNC: 11 MG/DL (ref 6–40)
WBC NRBC COR # BLD AUTO: 5.4 10*3/UL (ref 4.8–10.8)

## 2021-03-28 VITALS — SYSTOLIC BLOOD PRESSURE: 109 MMHG | DIASTOLIC BLOOD PRESSURE: 52 MMHG

## 2021-03-28 VITALS — SYSTOLIC BLOOD PRESSURE: 110 MMHG | DIASTOLIC BLOOD PRESSURE: 73 MMHG

## 2021-03-28 VITALS — DIASTOLIC BLOOD PRESSURE: 42 MMHG

## 2021-03-28 VITALS — SYSTOLIC BLOOD PRESSURE: 118 MMHG | DIASTOLIC BLOOD PRESSURE: 55 MMHG

## 2021-03-28 VITALS — DIASTOLIC BLOOD PRESSURE: 62 MMHG

## 2021-03-28 LAB
BASOPHILS # BLD AUTO: 0 10*3/UL (ref 0–0.1)
BASOPHILS NFR BLD AUTO: 0.4 % (ref 0–1)
BUN SERPL-MCNC: 15 MG/DL (ref 7–24)
CHLORIDE SERPL-SCNC: 101 MMOL/L (ref 98–107)
CREAT SERPL-MCNC: 0.9 MG/DL (ref 0.55–1.02)
EOSINOPHIL # BLD AUTO: 0.1 10*3/UL (ref 0–0.4)
EOSINOPHIL # BLD AUTO: 1 % (ref 1–4)
ERYTHROCYTE [DISTWIDTH] IN BLOOD BY AUTOMATED COUNT: 15.2 % (ref 0–14.5)
HCT VFR BLD AUTO: 41.7 % (ref 37–47)
INR BLD: 1.9 (ref 2–3.5)
LYMPHOCYTES # BLD AUTO: 1.1 10*3/UL (ref 1.3–4.4)
LYMPHOCYTES NFR BLD AUTO: 16.5 % (ref 27–41)
MCH RBC QN AUTO: 32.3 PG (ref 27–31)
MCHC RBC AUTO-ENTMCNC: 31.4 G/DL (ref 33–37)
MCV RBC AUTO: 103 FL (ref 81–99)
MONOCYTES # BLD AUTO: 0.7 10*3/UL (ref 0.1–1)
MONOCYTES NFR BLD MANUAL: 11.1 % (ref 3–9)
NEUT #: 4.7 10*3/UL (ref 2.3–7.9)
NEUT %: 70.9 % (ref 47–73)
NRBC BLD QL AUTO: 0 % (ref 0–0)
PLATELET # BLD AUTO: 197 10*3/UL (ref 130–400)
PMV BLD AUTO: 10.8 FL (ref 9.6–12.3)
POTASSIUM SERPL-SCNC: 3.6 MMOL/L (ref 3.5–5.1)
RBC # BLD AUTO: 4.05 10*6/UL (ref 4.1–5.1)
SODIUM SERPL-SCNC: 139 MMOL/L (ref 136–145)
WBC NRBC COR # BLD AUTO: 6.7 10*3/UL (ref 4.8–10.8)

## 2021-03-29 VITALS — DIASTOLIC BLOOD PRESSURE: 54 MMHG

## 2021-03-29 VITALS — SYSTOLIC BLOOD PRESSURE: 106 MMHG | DIASTOLIC BLOOD PRESSURE: 88 MMHG

## 2021-03-29 VITALS — DIASTOLIC BLOOD PRESSURE: 63 MMHG

## 2021-03-29 LAB
BUN SERPL-MCNC: 15 MG/DL (ref 7–24)
CHLORIDE SERPL-SCNC: 102 MMOL/L (ref 98–107)
CREAT SERPL-MCNC: 0.76 MG/DL (ref 0.55–1.02)
INR BLD: 1.8 (ref 2–3.5)
POTASSIUM SERPL-SCNC: 3.1 MMOL/L (ref 3.5–5.1)
SODIUM SERPL-SCNC: 139 MMOL/L (ref 136–145)

## 2021-03-31 ENCOUNTER — OFFICE VISIT (OUTPATIENT)
Dept: FAMILY MEDICINE CLINIC | Age: 86
End: 2021-03-31
Payer: MEDICARE

## 2021-03-31 VITALS
TEMPERATURE: 97.4 F | OXYGEN SATURATION: 100 % | WEIGHT: 143.4 LBS | DIASTOLIC BLOOD PRESSURE: 60 MMHG | BODY MASS INDEX: 25.41 KG/M2 | SYSTOLIC BLOOD PRESSURE: 104 MMHG | HEART RATE: 65 BPM | HEIGHT: 63 IN

## 2021-03-31 DIAGNOSIS — R60.9 EDEMA, UNSPECIFIED TYPE: ICD-10-CM

## 2021-03-31 DIAGNOSIS — I10 ESSENTIAL HYPERTENSION: ICD-10-CM

## 2021-03-31 DIAGNOSIS — I50.32 CHRONIC HEART FAILURE WITH PRESERVED EJECTION FRACTION (HCC): ICD-10-CM

## 2021-03-31 DIAGNOSIS — Z79.01 ANTICOAGULANT LONG-TERM USE: ICD-10-CM

## 2021-03-31 DIAGNOSIS — E87.6 HYPOKALEMIA: ICD-10-CM

## 2021-03-31 DIAGNOSIS — I48.0 PAF (PAROXYSMAL ATRIAL FIBRILLATION) (HCC): ICD-10-CM

## 2021-03-31 DIAGNOSIS — G89.4 CHRONIC PAIN SYNDROME: Primary | ICD-10-CM

## 2021-03-31 DIAGNOSIS — D75.89 MACROCYTOSIS: ICD-10-CM

## 2021-03-31 DIAGNOSIS — Z00.00 ROUTINE GENERAL MEDICAL EXAMINATION AT A HEALTH CARE FACILITY: Primary | ICD-10-CM

## 2021-03-31 DIAGNOSIS — G89.4 CHRONIC PAIN SYNDROME: ICD-10-CM

## 2021-03-31 LAB
INTERNATIONAL NORMALIZATION RATIO, POC: 1.9
POTASSIUM SERPL-SCNC: 4.3 MMOL/L (ref 3.5–5)
PROTHROMBIN TIME, POC: 23.2

## 2021-03-31 PROCEDURE — 1123F ACP DISCUSS/DSCN MKR DOCD: CPT | Performed by: INTERNAL MEDICINE

## 2021-03-31 PROCEDURE — G8484 FLU IMMUNIZE NO ADMIN: HCPCS | Performed by: INTERNAL MEDICINE

## 2021-03-31 PROCEDURE — G0438 PPPS, INITIAL VISIT: HCPCS | Performed by: INTERNAL MEDICINE

## 2021-03-31 PROCEDURE — G8417 CALC BMI ABV UP PARAM F/U: HCPCS | Performed by: INTERNAL MEDICINE

## 2021-03-31 PROCEDURE — G8427 DOCREV CUR MEDS BY ELIG CLIN: HCPCS | Performed by: INTERNAL MEDICINE

## 2021-03-31 PROCEDURE — 1090F PRES/ABSN URINE INCON ASSESS: CPT | Performed by: INTERNAL MEDICINE

## 2021-03-31 PROCEDURE — 85610 PROTHROMBIN TIME: CPT | Performed by: INTERNAL MEDICINE

## 2021-03-31 PROCEDURE — 1036F TOBACCO NON-USER: CPT | Performed by: INTERNAL MEDICINE

## 2021-03-31 PROCEDURE — 4040F PNEUMOC VAC/ADMIN/RCVD: CPT | Performed by: INTERNAL MEDICINE

## 2021-03-31 PROCEDURE — 99214 OFFICE O/P EST MOD 30 MIN: CPT | Performed by: INTERNAL MEDICINE

## 2021-03-31 RX ORDER — POLYETHYLENE GLYCOL 3350 17 G/17G
17 POWDER, FOR SOLUTION ORAL DAILY PRN
COMMUNITY

## 2021-03-31 RX ORDER — OXYCODONE AND ACETAMINOPHEN 10; 325 MG/1; MG/1
1 TABLET ORAL EVERY 8 HOURS PRN
Qty: 90 TABLET | Refills: 0 | Status: SHIPPED | OUTPATIENT
Start: 2021-03-31 | End: 2021-04-30

## 2021-03-31 ASSESSMENT — LIFESTYLE VARIABLES
HOW OFTEN DO YOU HAVE A DRINK CONTAINING ALCOHOL: NEVER
AUDIT-C TOTAL SCORE: INCOMPLETE
AUDIT TOTAL SCORE: INCOMPLETE

## 2021-03-31 ASSESSMENT — PATIENT HEALTH QUESTIONNAIRE - PHQ9
1. LITTLE INTEREST OR PLEASURE IN DOING THINGS: 0
SUM OF ALL RESPONSES TO PHQ9 QUESTIONS 1 & 2: 0
SUM OF ALL RESPONSES TO PHQ QUESTIONS 1-9: 0
SUM OF ALL RESPONSES TO PHQ QUESTIONS 1-9: 0

## 2021-03-31 NOTE — PATIENT INSTRUCTIONS
Personalized Preventive Plan for Chyrl Pert - 3/31/2021  Medicare offers a range of preventive health benefits. Some of the tests and screenings are paid in full while other may be subject to a deductible, co-insurance, and/or copay. Some of these benefits include a comprehensive review of your medical history including lifestyle, illnesses that may run in your family, and various assessments and screenings as appropriate. After reviewing your medical record and screening and assessments performed today your provider may have ordered immunizations, labs, imaging, and/or referrals for you. A list of these orders (if applicable) as well as your Preventive Care list are included within your After Visit Summary for your review. Other Preventive Recommendations:    · A preventive eye exam performed by an eye specialist is recommended every 1-2 years to screen for glaucoma; cataracts, macular degeneration, and other eye disorders. · A preventive dental visit is recommended every 6 months. · Try to get at least 150 minutes of exercise per week or 10,000 steps per day on a pedometer . · Order or download the FREE \"Exercise & Physical Activity: Your Everyday Guide\" from [employer]. Call 7-761.315.1099 or search [employer] online. · You need 4987-2980 mg of calcium and 0874-4812 IU of vitamin D per day. It is possible to meet your calcium requirement with diet alone, but a vitamin D supplement is usually necessary to meet this goal.  · When exposed to the sun, use a sunscreen that protects against both UVA and UVB radiation with an SPF of 30 or greater. Reapply every 2 to 3 hours or after sweating, drying off with a towel, or swimming. · Always wear a seat belt when traveling in a car. Always wear a helmet when riding a bicycle or motorcycle.

## 2021-03-31 NOTE — PROGRESS NOTES
Medicare Annual Wellness Visit  Name: Rachael Hence Date: 3/31/2021   MRN: [id number] Sex: Female   Age: 80 y.o. Ethnicity: Non-/Non    : 3/1/1932 Race: Lee Pandey is here for Medicare AWV    Screenings for behavioral, psychosocial and functional/safety risks, and cognitive dysfunction are all negative except as indicated below. These results, as well as other patient data from the [address] form, are documented in Flowsheets linked to this Encounter. No Known Allergies    Prior to Visit Medications    Medication Sig Taking? Authorizing Provider   oxyCODONE-acetaminophen (PERCOCET)  MG per tablet Take 1 tablet by mouth every 8 hours as needed for Pain for up to 30 days. Francisca Arzola MD   polyethylene glycol (GLYCOLAX) 17 g packet Take 17 g by mouth daily as needed for Constipation  Historical Provider, MD   rOPINIRole (REQUIP) 0.25 MG tablet Take 1 pill a couple hours before bedtime.   Francisca Arzola MD   warfarin (COUMADIN) 5 MG tablet Take 5 mg by mouth 2.5 mg Sat/Sun and 5mg all other days  Historical Provider, MD   atorvastatin (LIPITOR) 40 MG tablet Take 1 tablet by mouth daily  Francisca Arzola MD   furosemide (LASIX) 20 MG tablet Take 1 tablet by mouth 2 times daily  Francisca Arzola MD   FLUoxetine (PROZAC) 10 MG capsule Take 1 capsule by mouth daily  Francisca Arzola MD   isosorbide mononitrate (IMDUR) 30 MG extended release tablet Take 1 tablet by mouth daily Take morning of surgery with a sip of water  Francisca Arzola MD   lisinopril (ZESTRIL) 2.5 MG tablet Take 1 tablet by mouth daily  Francisca Arzola MD   potassium chloride (KLOR-CON M) 20 MEQ TBCR extended release tablet Take 1 tablet by mouth 2 times daily  Francisca Arzola MD   budesonide (PULMICORT) 0.5 MG/2ML nebulizer suspension INHALE THE CONTENTS OF ONE VIAL (2ML) VIA NEBULIZER TWO TIMES A DAY  Historical Provider, MD   ipratropium-albuterol (DUONEB) 0.5-2.5 (3) MG/3ML SOLN nebulizer solution INHALE THE CONTENTS OF 1 VIAL (3ML) VIA NEBULIZER 3 TIMES A DAY AND AS NEEDED FOR DYSPNEA  COUGH OR WHEEZING  Historical Provider, MD   Cyanocobalamin (VITAMIN B-12) 5000 MCG TBDP Take 5,000 mcg by mouth daily   Historical Provider, MD   Lactobacillus (PROBIOTIC ACIDOPHILUS PO) Take by mouth  Historical Provider, MD   docusate sodium (COLACE) 100 MG capsule Take 100 mg by mouth 2 times daily   Historical Provider, MD   nystatin (MYCOSTATIN) 396526 UNIT/GM powder Apply 3 times daily. 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)     CAD (coronary artery disease)     CHF (congestive heart failure) ([facility] [address]     Chronic pain syndrome 5/29/2019    HTN (hypertension)     Hx of blood clots 2015    right upper arm    Hyperlipemia        Past Surgical History:   Procedure Laterality Date    BACK SURGERY  2010    [facility], pt unsure    COLONOSCOPY      EYE SURGERY Bilateral     cataracts    FIXATION KYPHOPLASTY  10/02/2017    T12 kyphoplasty Dr Won Castro KYPHOPLASTY  11/20/2017    T11-L1 kypho    HYSTERECTOMY      HYSTERECTOMY, TOTAL ABDOMINAL      TONSILLECTOMY      TRANSESOPHAGEAL ECHOCARDIOGRAM  11/04/15       Family History   Problem Relation Age of Onset    Breast Cancer Mother [de-identified]    Heart Disease Father     Cancer Sister         melanoma       CareTeam (Including outside providers/suppliers regularly involved in providing care):   Patient Care Team:  Quin Cordero MD as PCP - General (Internal Medicine)  Quin Cordero MD as PCP - [facility] Empaneled Provider    Wt Readings from Last 3 Encounters:   03/31/21 143 lb 6.4 oz (65 kg)   01/27/21 132 lb (59.9 kg)   10/12/20 123 lb 6.4 oz (56 kg)     There were no vitals filed for this visit. There is no height or weight on file to calculate BMI.     Based upon direct observation of the bathing, toileting, or walking/balance?: None  In the past 7 days, did you need help from others to take care of any of the following? Laundry, housekeeping, banking/finances, shopping, telephone use, food preparation, transportation, or taking medications?: [employer], Housekeeping, Banking/Finances, Shopping, Transportation, Taking Medications  ADL Interventions:  · Patient gets help with the above from family. Personalized Preventive Plan   Current Health Maintenance Status  Immunization History   Administered Date(s) Administered    COVID-19, Moderna, PF, 100mcg/0.5mL 01/26/2021, 02/23/2021    DT (pediatric) 09/09/2004    Influenza Virus Vaccine 12/07/2005, 12/06/2006, 12/15/2010, 11/05/2013, 11/16/2014, 10/08/2015, 10/10/2016, 10/11/2017    Influenza, High Dose (Fluzone 65 yrs and older) 11/21/2018    Influenza, Quadv, adjuvanted, 65 yrs +, IM, PF (Fluad) 10/12/2020    Influenza, Triv, inactivated, subunit, adjuvanted, IM (Fluad 65 yrs and older) 10/21/2019    Pneumococcal Polysaccharide ([id number]) 12/08/2005    Tdap (Boostrix, Adacel) 11/18/2015    Tetanus Toxoid, absorbed 09/09/2004        Health Maintenance   Topic Date Due    Shingles Vaccine (1 of 2) Never done   [employer] Visit (AWV)  Never done    Lipid screen  03/27/2022    Potassium monitoring  03/29/2022    Creatinine monitoring  03/29/2022    DTaP/Tdap/Td vaccine (3 - Td) 11/18/2025    Flu vaccine  Completed    Pneumococcal 65+ years Vaccine  Completed    COVID-19 Vaccine  Completed    Hepatitis A vaccine  Aged Out    Hepatitis B vaccine  Aged Out    Hib vaccine  Aged Out    Meningococcal (ACWY) vaccine  Aged Out     Recommendations for [facility] Due: see orders and patient instructions/AVS.  . Recommended screening schedule for the next 5-10 years is provided to the patient in written form: see Patient Instructions/AVS.    There are no diagnoses linked to this encounter.

## 2021-04-01 ENCOUNTER — TELEPHONE (OUTPATIENT)
Dept: FAMILY MEDICINE CLINIC | Age: 86
End: 2021-04-01

## 2021-04-01 LAB
AMPHETAMINE SCREEN, URINE: NOT DETECTED
BARBITURATE SCREEN URINE: NOT DETECTED
BENZODIAZEPINE SCREEN, URINE: NOT DETECTED
CANNABINOID SCREEN URINE: NOT DETECTED
COCAINE METABOLITE SCREEN URINE: NOT DETECTED
FENTANYL SCREEN, URINE: NOT DETECTED
Lab: ABNORMAL
METHADONE SCREEN, URINE: NOT DETECTED
OPIATE SCREEN URINE: NOT DETECTED
OXYCODONE URINE: POSITIVE
PHENCYCLIDINE SCREEN URINE: NOT DETECTED

## 2021-04-01 NOTE — PROGRESS NOTES
[facility]     3/31/21  Susan Loss : 3/1/1932 Sex: female  Age: 80 y.o. Chief Complaint   Patient presents with    Hypertension    Office Visit for Anticoagulation Management    Chronic Pain       HPI    Patient presents today in wheelchair accompanied by her daughter for follow-up visit on her multiple medical problems. She is also here to have her INR checked being on Coumadin for arterial embolism history of DVT  She is also here today requesting refill on her narcotic medication that she has been on for years. This for chronic pain syndrome/back pain. OARRS report was run and okay. No evidence of abuse or diversion of the medication. Also here for management of anticoagulation. INR today was 1.9. This is with missing dose yesterday as daughter did not place it in her container apparently. I told him going to leave her on her same dose of 5 mg Monday through Friday and 2.5 mg Saturday and . We will recheck another PT/INR in 1 month. She is to watch for any bleeding. She was just hospitalized for congestive heart failure/volume overload at [facility].  I did receive the documentation available to me including the discharge summary. We reconciled her medications. Daughter had her on Lasix and potassium once a day discharge summary recommends twice a day and I did have them bump her up to that level today. She is breathing better. Her weight is down 17 pounds from prehospital state. Her swelling is down. States she is breathing easy. She continues to use her nebulizer and oxygen for COPD. Visiting nurses have been coming in with her homebound status as she is having great difficulty in getting out of the house without assistance secondary to her chronic pain, arthritis and breathing issues with COPD and most recent CHF. They have been checking her PT/INR on a weekly basis recently which has been very beneficial to the patient.   Been direct medication with the nurse on a weekly basis. Her lipids have been stable on statin medication. Depression has been stable on her SSRI  Patient is following with urology, podiatry, renal, cardiology, pulmonary and did see hematology/oncology because of macrocytosis.     Review of Systems     Constitutional:  negative for activity change, chills, diaphoresis,  and fever.  Positive for fatigue  HENT: Negative for congestion, ear pain, hearing loss, postnasal drip, rhinorrhea and sinus pain.    Respiratory: Positive for shortness of breath-improved post hospitalization for CHF. Negative for cough and wheezing.         COPD chronic oxygen use   Cardiovascular: Negative for chest pain, palpitations.  Positive for leg swelling.  -Improved post diuresis  Gastrointestinal: Positive for constipation.   Negative for abdominal pain, blood in stool, diarrhea,  and vomiting.         Endocrine: Negative.    Genitourinary: Negative for difficulty urinating, dysuria, hematuria and urgency.         Reports frequent UTIs .does have some incontinence of urine following with urology. [place] hematuria has been worked up.   Musculoskeletal: Positive for arthralgias.  back pain, and gait problem       Chronic pain syndrome/back pain--lower back pain and bilateral hip pain/chronic-continues on narcotic pain medication with success. T11-L1 vertebroplasty. Successful.   Skin: Negative.    Neurological: Negative for dizziness, weakness, light-headedness, numbness and headaches. Hematological: Negative.    Psychiatric/Behavioral: Admits to being more emotional and angry since taken off of her Prozac.-Placed back on again with improvement  Does appear to be in much better spirits today.         REST OF PERTINENT ROS GONE OVER AND WAS NEGATIVE.    Past medical history:  Problem List: Chronic pain syndrome, Long-term current use of anticoagulant, Anticoagulant, Osteoarthritis, Taking  medication, Hyperlipidemia  Health Maintenance:  Mammogram - (2019)  Mammogram Screening - (2019)  Influenza Vaccination - (2018)  Bone Density Scan - (2011)  Colonoscopy - (2015)  Couseled on Home Safety - (2017)  Bone Density Test Screening - (2014)  Stress Test - -ok,3/11  EKG - ,11,3/13  Rectal Exam - 8/10  Breast Exam - 8/10,declined,  Hemmocult Cards - -neg x 3  2D ECHO - 8/15  Mini Mental Status - --  Medical Problems:  Osteoarthritis  DVT - right-  Hypertension  Lumbar Spondylosis-Herniated Disc-Spinal Stenosis - epidurals/[place]  Hyperlipidemia, YAIR-BSO--Non Cancerous  Small AAA - 3.4cm--- .--3.5 cm per CT urogram, CT abdomen-920  HX FX Left Ankle, Chronic Greater Trochanteric Bursitis, Depression, Cataracts  Coronary Artery Disease (CAD) - Previously declined cardiology fu  neurogenic claudication  Spinal Stenosis - L2-5 lumbar laminectomy-dr [facility]sey,[facility]  Kidney Stones, Anxiety, Diverticulitis, Osteoporosis, post laminectomy syndrome lumbar spine  Goiter - multinodular  hematuria - cystoscopy/retrograde-worked up by urology. urethral stenosis - dialated  chronic urethral inflammation, strep bovis bacteremia, Congestive Heart Failure (CHF), arterial thrombosis right arm  T12 compression fracture. - Vertebroplasty  Lumbar Spondylosis-Herniated Disc-Spinal Stenosis  T11 and L1 kyphoplasty -   Dilated nonischemic cardiomyopathy - Follows with cardiology  Copd  Hospitalization for congestive heart failure volume overload, ascites-  Cirrhosis per CAT scan  Aortic regurgitation-moderate  Pulmonary hypertension  Hospitalization--HFpEF-3/21     Reviewed and updated. FH:  Father:  MI -  late 63's. Mother:  Breast Cancer - ? ?  AGE 80. Son 1:  Chronic Obstructive Pulmonary Disease (COPD) -  age 61. Sister 1:  melanoma -  age in her 52's. Reviewed, no changes. SH:  Marital: . Personal Habits: Cigarette Use: Does Not Smoke. Alcohol: does not use alcohol. Reviewed, no changes.               Current Outpatient Medications:     oxyCODONE-acetaminophen (PERCOCET)  MG per tablet, Take 1 tablet by mouth every 8 hours as needed for Pain for up to 30 days. , Disp: 90 tablet, Rfl: 0    polyethylene glycol (GLYCOLAX) 17 g packet, Take 17 g by mouth daily as needed for Constipation, Disp: , Rfl:     rOPINIRole (REQUIP) 0.25 MG tablet, Take 1 pill a couple hours before bedtime. , Disp: 90 tablet, Rfl: 1    warfarin (COUMADIN) 5 MG tablet, Take 5 mg by mouth 2.5 mg Sat/Sun and 5mg all other days, Disp: , Rfl:     atorvastatin (LIPITOR) 40 MG tablet, Take 1 tablet by mouth daily, Disp: 90 tablet, Rfl: 1    furosemide (LASIX) 20 MG tablet, Take 1 tablet by mouth 2 times daily, Disp: 180 tablet, Rfl: 1    FLUoxetine (PROZAC) 10 MG capsule, Take 1 capsule by mouth daily, Disp: 90 capsule, Rfl: 1    isosorbide mononitrate (IMDUR) 30 MG extended release tablet, Take 1 tablet by mouth daily Take morning of surgery with a sip of water, Disp: 90 tablet, Rfl: 1    lisinopril (ZESTRIL) 2.5 MG tablet, Take 1 tablet by mouth daily, Disp: 90 tablet, Rfl: 1    potassium chloride (KLOR-CON M) 20 MEQ TBCR extended release tablet, Take 1 tablet by mouth 2 times daily, Disp: 180 tablet, Rfl: 1    budesonide (PULMICORT) 0.5 MG/2ML nebulizer suspension, INHALE THE CONTENTS OF ONE VIAL (2ML) VIA NEBULIZER TWO TIMES A DAY, Disp: , Rfl:     ipratropium-albuterol (DUONEB) 0.5-2.5 (3) MG/3ML SOLN nebulizer solution, INHALE THE CONTENTS OF 1 VIAL (3ML) VIA NEBULIZER 3 TIMES A DAY AND AS NEEDED FOR DYSPNEA  COUGH OR WHEEZING, Disp: , Rfl:     Cyanocobalamin (VITAMIN B-12) 5000 MCG TBDP, Take 5,000 mcg by mouth daily , Disp: , Rfl:     Lactobacillus (PROBIOTIC ACIDOPHILUS PO), Take by mouth, Disp: , Rfl:     docusate sodium (COLACE) 100 MG capsule, Take 100 mg by mouth 2 times daily , Disp: , Rfl:     nystatin (MYCOSTATIN) 175368 UNIT/GM powder, Apply 3 times daily. , Disp: 60 g, Rfl: 1

## 2021-04-05 ENCOUNTER — TELEPHONE (OUTPATIENT)
Dept: FAMILY MEDICINE CLINIC | Age: 86
End: 2021-04-05

## 2021-04-05 NOTE — TELEPHONE ENCOUNTER
Noelle Bain - Daughter  -  [phone]      She is concerned bout her mom sleeping too much. She is starting to sleep into the afternoon. I asked if she is up late, and was told that she is not. On saturday she was up until 11:00pm and didn't get up until 2:00pm in the afternoon. When she asks her mom about it, she just states that she is tired. Is this a concern?   This has been going on since she got out of the hospital.

## 2021-04-05 NOTE — TELEPHONE ENCOUNTER
Unless she is having fevers or some symptoms of illness the obvious starting point would be to cut back on her oxycodone. Have them check her pulse ox is at home and make sure she is saturating okay.

## 2021-04-19 ENCOUNTER — ANTI-COAG VISIT (OUTPATIENT)
Dept: FAMILY MEDICINE CLINIC | Age: 86
End: 2021-04-19

## 2021-04-19 LAB — INR BLD: 3.6

## 2021-04-19 NOTE — PROGRESS NOTES
Visiting nurse called Dr Lina Michel today on his cell regarding patient's INR. Her INR was 3.6. He advised the nurse to hold the coumadin today and made the following adjustments: 2.5mg Thurs - Sun      5mg Mon-Wed    Repeat INR will be completed next week.

## 2021-04-26 ENCOUNTER — ANTI-COAG VISIT (OUTPATIENT)
Dept: FAMILY MEDICINE CLINIC | Age: 86
End: 2021-04-26

## 2021-04-26 LAB — INR BLD: 2.1

## 2021-04-26 NOTE — PROGRESS NOTES
[address] [place] [address] called Dr Valeria Lock about patients INR result of 2.1. Dr Valeria Lock advised to keep her on the same dose she has been on.

## 2021-05-03 ENCOUNTER — ANTI-COAG VISIT (OUTPATIENT)
Dept: FAMILY MEDICINE CLINIC | Age: 86
End: 2021-05-03

## 2021-05-03 LAB — INR BLD: 2

## 2021-05-03 NOTE — PROGRESS NOTES
[place] [facility] called Dr Juide Craft with patients INR results of 2.0. Patient is currently taking 5mg m-f, 2.5mg sat & sun. She is to stay on the same dose and the nurse will re-check next week.

## 2021-05-10 ENCOUNTER — ANTI-COAG VISIT (OUTPATIENT)
Dept: FAMILY MEDICINE CLINIC | Age: 86
End: 2021-05-10

## 2021-05-10 ENCOUNTER — VIRTUAL VISIT (OUTPATIENT)
Dept: FAMILY MEDICINE CLINIC | Age: 86
End: 2021-05-10
Payer: MEDICARE

## 2021-05-10 DIAGNOSIS — G89.4 CHRONIC PAIN SYNDROME: Primary | ICD-10-CM

## 2021-05-10 DIAGNOSIS — J44.9 CHRONIC OBSTRUCTIVE PULMONARY DISEASE, UNSPECIFIED COPD TYPE (HCC): ICD-10-CM

## 2021-05-10 DIAGNOSIS — D75.89 MACROCYTOSIS: ICD-10-CM

## 2021-05-10 DIAGNOSIS — Z79.01 ANTICOAGULANT LONG-TERM USE: ICD-10-CM

## 2021-05-10 DIAGNOSIS — F11.90 CHRONIC, CONTINUOUS USE OF OPIOIDS: ICD-10-CM

## 2021-05-10 DIAGNOSIS — Z91.81 AT HIGH RISK FOR FALLS: ICD-10-CM

## 2021-05-10 DIAGNOSIS — I48.0 PAF (PAROXYSMAL ATRIAL FIBRILLATION) (HCC): ICD-10-CM

## 2021-05-10 DIAGNOSIS — I10 ESSENTIAL HYPERTENSION: ICD-10-CM

## 2021-05-10 DIAGNOSIS — I50.32 CHRONIC HEART FAILURE WITH PRESERVED EJECTION FRACTION (HCC): ICD-10-CM

## 2021-05-10 LAB — INR BLD: 3

## 2021-05-10 PROCEDURE — G8926 SPIRO NO PERF OR DOC: HCPCS | Performed by: INTERNAL MEDICINE

## 2021-05-10 PROCEDURE — 1123F ACP DISCUSS/DSCN MKR DOCD: CPT | Performed by: INTERNAL MEDICINE

## 2021-05-10 PROCEDURE — G8427 DOCREV CUR MEDS BY ELIG CLIN: HCPCS | Performed by: INTERNAL MEDICINE

## 2021-05-10 PROCEDURE — 3023F SPIROM DOC REV: CPT | Performed by: INTERNAL MEDICINE

## 2021-05-10 PROCEDURE — 4040F PNEUMOC VAC/ADMIN/RCVD: CPT | Performed by: INTERNAL MEDICINE

## 2021-05-10 PROCEDURE — 99213 OFFICE O/P EST LOW 20 MIN: CPT | Performed by: INTERNAL MEDICINE

## 2021-05-10 PROCEDURE — 1036F TOBACCO NON-USER: CPT | Performed by: INTERNAL MEDICINE

## 2021-05-10 PROCEDURE — 1090F PRES/ABSN URINE INCON ASSESS: CPT | Performed by: INTERNAL MEDICINE

## 2021-05-10 PROCEDURE — G8417 CALC BMI ABV UP PARAM F/U: HCPCS | Performed by: INTERNAL MEDICINE

## 2021-05-10 RX ORDER — OXYCODONE AND ACETAMINOPHEN 10; 325 MG/1; MG/1
1 TABLET ORAL EVERY 4 HOURS PRN
COMMUNITY
End: 2021-05-10 | Stop reason: SDUPTHER

## 2021-05-10 RX ORDER — OXYCODONE AND ACETAMINOPHEN 10; 325 MG/1; MG/1
1 TABLET ORAL EVERY 4 HOURS PRN
Qty: 90 TABLET | Refills: 0 | Status: SHIPPED | OUTPATIENT
Start: 2021-05-10 | End: 2021-06-09

## 2021-05-10 NOTE — PROGRESS NOTES
significantly. She is tolerating it well. She has had no falls. OARRS report was run and okay. No evidence of abuse or diversion of the medication. Patient swelling has remained down. Unfortunately she is not weighing herself on a regular basis. Weight is down about a pound and a half since I saw her last month. Pulse oximeter today was 94 states her blood pressures been in good range. Patient denies any chest pain or significant shortness of breath. She does get around with a walker but is fairly sedentary. Denies any falls. She did have an INR check through home health nurse today which was 3.0. I dressed this by keeping her on her same dose and repeating a number next week again. No bleeding or thrombotic episodes. We did do a drug screen on her last visit which was appropriate. Patient is following with urology, podiatry,cardiology, pulmonary and did see hematology/oncology because of macrocytosis but no longer following. Also no longer following with renal    Review of Systems        Constitutional:  negative for activity change, chills, diaphoresis,  and fever.  Positive for fatigue-improved  HENT: Negative for congestion, ear pain, hearing loss, postnasal drip, rhinorrhea and sinus pain.    Respiratory: Positive for shortness of breath-improved post hospitalization for CHF. Negative for cough and wheezing.         COPD chronic oxygen use   Cardiovascular: Negative for chest pain, palpitations.  Positive for leg swelling.  -Improved post diuresis  Gastrointestinal: Positive for constipation.   Negative for abdominal pain, blood in stool, diarrhea,  and vomiting.         Endocrine: Negative.    Genitourinary: Negative for difficulty urinating, dysuria, hematuria and urgency.         Reports frequent UTIs .does have some incontinence of urine following with urology. [facility] hematuria has been worked up.   Musculoskeletal: Positive for arthralgias.  back pain, and gait problem       Chronic pain syndrome/back pain--lower back pain and bilateral hip pain/chronic-continues on narcotic pain medication with success. T11-L1 vertebroplasty. Successful.   Skin: Negative.    Neurological: Negative for dizziness, weakness, light-headedness, numbness and headaches. Hematological: Negative.    Psychiatric/Behavioral: Admits to being more emotional and angry since taken off of her Prozac.-Placed back on again with improvement  Does appear to be in much better spirits today.           ROS GONE OVER AND WAS NEGATIVE. Current Outpatient Medications:     oxyCODONE-acetaminophen (PERCOCET)  MG per tablet, Take 1 tablet by mouth every 4 hours as needed for Pain., Disp: , Rfl:     polyethylene glycol (GLYCOLAX) 17 g packet, Take 17 g by mouth daily as needed for Constipation, Disp: , Rfl:     rOPINIRole (REQUIP) 0.25 MG tablet, Take 1 pill a couple hours before bedtime. , Disp: 90 tablet, Rfl: 1    warfarin (COUMADIN) 5 MG tablet, Take 5 mg by mouth 2.5 mg Sat/Sun and 5mg all other days, Disp: , Rfl:     atorvastatin (LIPITOR) 40 MG tablet, Take 1 tablet by mouth daily, Disp: 90 tablet, Rfl: 1    furosemide (LASIX) 20 MG tablet, Take 1 tablet by mouth 2 times daily, Disp: 180 tablet, Rfl: 1    FLUoxetine (PROZAC) 10 MG capsule, Take 1 capsule by mouth daily, Disp: 90 capsule, Rfl: 1    isosorbide mononitrate (IMDUR) 30 MG extended release tablet, Take 1 tablet by mouth daily Take morning of surgery with a sip of water, Disp: 90 tablet, Rfl: 1    lisinopril (ZESTRIL) 2.5 MG tablet, Take 1 tablet by mouth daily, Disp: 90 tablet, Rfl: 1    potassium chloride (KLOR-CON M) 20 MEQ TBCR extended release tablet, Take 1 tablet by mouth 2 times daily, Disp: 180 tablet, Rfl: 1    budesonide (PULMICORT) 0.5 MG/2ML nebulizer suspension, INHALE THE CONTENTS OF ONE VIAL (2ML) VIA NEBULIZER TWO TIMES A DAY, Disp: , Rfl:     ipratropium-albuterol (DUONEB) 0.5-2.5 (3) MG/3ML SOLN nebulizer solution, INHALE THE CONTENTS OF 1 VIAL (3ML) VIA NEBULIZER 3 TIMES A DAY AND AS NEEDED FOR DYSPNEA  COUGH OR WHEEZING, Disp: , Rfl:     Cyanocobalamin (VITAMIN B-12) 5000 MCG TBDP, Take 5,000 mcg by mouth daily , Disp: , Rfl:     Lactobacillus (PROBIOTIC ACIDOPHILUS PO), Take by mouth, Disp: , Rfl:     docusate sodium (COLACE) 100 MG capsule, Take 100 mg by mouth 2 times daily , Disp: , Rfl:     nystatin (MYCOSTATIN) 586659 UNIT/GM powder, Apply 3 times daily. 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)     CAD (coronary artery disease)     CHF (congestive heart failure) (HCC)     Chronic pain syndrome 5/29/2019    HTN (hypertension)     Hx of blood clots 2015    right upper arm    Hyperlipemia      Past Surgical History:   Procedure Laterality Date    BACK SURGERY  [facility], pt unsure    COLONOSCOPY      EYE SURGERY Bilateral     cataracts    FIXATION KYPHOPLASTY  10/02/2017    T12 kyphoplasty Dr Curly Rosario KYPHOPLASTY  11/20/2017    T11-L1 kypho    HYSTERECTOMY      HYSTERECTOMY, TOTAL ABDOMINAL      TONSILLECTOMY      TRANSESOPHAGEAL ECHOCARDIOGRAM  11/04/15     Family History   Problem Relation Age of Onset    Breast Cancer Mother [de-identified]    Heart Disease Father     Cancer Sister         melanoma     Social History     Socioeconomic History    Marital status:      Spouse name: Not on file    Number of children: Not on file    Years of education: Not on file    Highest education level: Not on file   Occupational History    Not on file   Social Needs    Financial resource strain: Patient refused    Food insecurity     Worry: Patient refused     Inability: Patient refused    Transportation needs     Medical: Patient refused     Non-medical: Patient refused   Tobacco Use    Smoking status: Former Smoker    Smokeless tobacco: Never Used    Tobacco comment: quit about 30 yrs ago   Substance and Sexual Activity    Alcohol use: No    Drug use: No    Sexual activity: Not on file   Lifestyle    Physical activity     Days per week: Not on file     Minutes per session: Not on file    Stress: Not on file   Relationships    Social connections     Talks on phone: Not on file     Gets together: Not on file     Attends Buddhist service: Not on file     Active member of club or organization: Not on file     Attends meetings of clubs or organizations: Not on file     Relationship status: Not on file    Intimate partner violence     Fear of current or ex partner: Not on file     Emotionally abused: Not on file     Physically abused: Not on file     Forced sexual activity: Not on file   Other Topics Concern    Not on file   Social History Narrative    Not on file       There were no vitals filed for this visit. Physical Exam  Constitutional:       General: She is not in acute distress. HENT:      Head: Normocephalic and atraumatic. Pulmonary:      Effort: Pulmonary effort is normal.   Musculoskeletal: Normal range of motion. General: Swelling present. Comments: Swelling has improved   Neurological:      Mental Status: She is alert and oriented to person, place, and time. Psychiatric:         Mood and Affect: Mood normal.         Behavior: Behavior normal.                 Assessment and Plan:  Laci Dove was seen today for hypertension and chronic pain. Diagnoses and all orders for this visit:    Chronic pain syndrome    Chronic obstructive pulmonary disease, unspecified COPD type ([address]    Chronic heart failure with preserved ejection fraction (HCC)    Essential hypertension    Macrocytosis    Chronic, continuous use of opioids    At high risk for falls    PAF (paroxysmal atrial fibrillation) ([facility] [address]    Anticoagulant long-term use      Plan: Follow-up INRs via home health.   We will see back in 2 months in office for regular visit. Look over April visit when I see her at that time. Prescription management performed. Reviewed last blood work. Fall precautions. Follow-up with above consultants. Notify us of problems in the interim. I did stress that she needs to start weighing herself on a more regular basis and needs to get another blood pressure cuff and start checking. Return in about 1 month (around 6/10/2021), or 1 month narcotic visit 2-month regular follow-up visit. .    Seen By:  Lars Wood MD      *Document was created using voice recognition software. Note was reviewed however may contain grammatical errors. Patient's location: home address in [facility]  Physician  location Office address in [place] other people involved in [address] [place] [address]      Not billed by time    This visit was completed virtually using [employer]. me

## 2021-05-17 ENCOUNTER — HOSPITAL ENCOUNTER (OUTPATIENT)
Age: 86
Setting detail: OBSERVATION
Discharge: HOME | End: 2021-05-18
Attending: EMERGENCY MEDICINE | Admitting: INTERNAL MEDICINE
Payer: MEDICARE

## 2021-05-17 ENCOUNTER — APPOINTMENT (OUTPATIENT)
Dept: GENERAL RADIOLOGY | Age: 86
End: 2021-05-17
Payer: MEDICARE

## 2021-05-17 DIAGNOSIS — R79.1 SUPRATHERAPEUTIC INR: Primary | ICD-10-CM

## 2021-05-17 PROBLEM — F11.90 CHRONIC, CONTINUOUS USE OF OPIOIDS: Status: RESOLVED | Noted: 2019-09-16 | Resolved: 2021-05-17

## 2021-05-17 LAB
ANION GAP SERPL CALCULATED.3IONS-SCNC: 5 MMOL/L (ref 7–16)
APTT: 55.3 SEC (ref 24.5–35.1)
BASOPHILS ABSOLUTE: 0.03 E9/L (ref 0–0.2)
BASOPHILS RELATIVE PERCENT: 0.5 % (ref 0–2)
BUN BLDV-MCNC: 20 MG/DL (ref 6–23)
CALCIUM SERPL-MCNC: 8.8 MG/DL (ref 8.6–10.2)
CHLORIDE BLD-SCNC: 107 MMOL/L (ref 98–107)
CO2: 29 MMOL/L (ref 22–29)
CREAT SERPL-MCNC: 1.1 MG/DL (ref 0.5–1)
EOSINOPHILS ABSOLUTE: 0.05 E9/L (ref 0.05–0.5)
EOSINOPHILS RELATIVE PERCENT: 0.9 % (ref 0–6)
GFR AFRICAN AMERICAN: 57
GFR NON-AFRICAN AMERICAN: 47 ML/MIN/1.73
GLUCOSE BLD-MCNC: 82 MG/DL (ref 74–99)
HCT VFR BLD CALC: 44.1 % (ref 34–48)
HEMOGLOBIN: 13.2 G/DL (ref 11.5–15.5)
IMMATURE GRANULOCYTES #: 0.02 E9/L
IMMATURE GRANULOCYTES %: 0.4 % (ref 0–5)
INR BLD: >10
LYMPHOCYTES ABSOLUTE: 1.07 E9/L (ref 1.5–4)
LYMPHOCYTES RELATIVE PERCENT: 19.1 % (ref 20–42)
MCH RBC QN AUTO: 31 PG (ref 26–35)
MCHC RBC AUTO-ENTMCNC: 29.9 % (ref 32–34.5)
MCV RBC AUTO: 103.5 FL (ref 80–99.9)
MONOCYTES ABSOLUTE: 0.63 E9/L (ref 0.1–0.95)
MONOCYTES RELATIVE PERCENT: 11.3 % (ref 2–12)
NEUTROPHILS ABSOLUTE: 3.79 E9/L (ref 1.8–7.3)
NEUTROPHILS RELATIVE PERCENT: 67.8 % (ref 43–80)
PDW BLD-RTO: 15.9 FL (ref 11.5–15)
PLATELET # BLD: 203 E9/L (ref 130–450)
PMV BLD AUTO: 10.6 FL (ref 7–12)
POTASSIUM REFLEX MAGNESIUM: 4.5 MMOL/L (ref 3.5–5)
PRO-BNP: 4602 PG/ML (ref 0–450)
PROTHROMBIN TIME: 119.9 SEC (ref 9.3–12.4)
RBC # BLD: 4.26 E12/L (ref 3.5–5.5)
SODIUM BLD-SCNC: 141 MMOL/L (ref 132–146)
TROPONIN: <0.01 NG/ML (ref 0–0.03)
WBC # BLD: 5.6 E9/L (ref 4.5–11.5)

## 2021-05-17 PROCEDURE — 6370000000 HC RX 637 (ALT 250 FOR IP): Performed by: STUDENT IN AN ORGANIZED HEALTH CARE EDUCATION/TRAINING PROGRAM

## 2021-05-17 PROCEDURE — G0378 HOSPITAL OBSERVATION PER HR: HCPCS

## 2021-05-17 PROCEDURE — 85025 COMPLETE CBC W/AUTO DIFF WBC: CPT

## 2021-05-17 PROCEDURE — 85610 PROTHROMBIN TIME: CPT

## 2021-05-17 PROCEDURE — 93005 ELECTROCARDIOGRAM TRACING: CPT | Performed by: STUDENT IN AN ORGANIZED HEALTH CARE EDUCATION/TRAINING PROGRAM

## 2021-05-17 PROCEDURE — 80048 BASIC METABOLIC PNL TOTAL CA: CPT

## 2021-05-17 PROCEDURE — 85730 THROMBOPLASTIN TIME PARTIAL: CPT

## 2021-05-17 PROCEDURE — 84484 ASSAY OF TROPONIN QUANT: CPT

## 2021-05-17 PROCEDURE — 83880 ASSAY OF NATRIURETIC PEPTIDE: CPT

## 2021-05-17 PROCEDURE — 99284 EMERGENCY DEPT VISIT MOD MDM: CPT

## 2021-05-17 PROCEDURE — 2580000003 HC RX 258: Performed by: INTERNAL MEDICINE

## 2021-05-17 PROCEDURE — 71045 X-RAY EXAM CHEST 1 VIEW: CPT

## 2021-05-17 RX ORDER — POTASSIUM CHLORIDE 20 MEQ/1
20 TABLET, EXTENDED RELEASE ORAL 2 TIMES DAILY
Status: DISCONTINUED | OUTPATIENT
Start: 2021-05-17 | End: 2021-05-18 | Stop reason: HOSPADM

## 2021-05-17 RX ORDER — WARFARIN SODIUM 2.5 MG/1
2.5 TABLET ORAL
Status: ON HOLD | COMMUNITY
End: 2021-05-18 | Stop reason: HOSPADM

## 2021-05-17 RX ORDER — FLUOXETINE 10 MG/1
10 TABLET, FILM COATED ORAL DAILY
Status: DISCONTINUED | OUTPATIENT
Start: 2021-05-17 | End: 2021-05-18 | Stop reason: HOSPADM

## 2021-05-17 RX ORDER — ACETAMINOPHEN 650 MG/1
650 SUPPOSITORY RECTAL EVERY 6 HOURS PRN
Status: DISCONTINUED | OUTPATIENT
Start: 2021-05-17 | End: 2021-05-18 | Stop reason: HOSPADM

## 2021-05-17 RX ORDER — BUDESONIDE 0.5 MG/2ML
0.5 INHALANT ORAL 2 TIMES DAILY
Status: DISCONTINUED | OUTPATIENT
Start: 2021-05-17 | End: 2021-05-18 | Stop reason: HOSPADM

## 2021-05-17 RX ORDER — LISINOPRIL 5 MG/1
2.5 TABLET ORAL DAILY
Status: DISCONTINUED | OUTPATIENT
Start: 2021-05-17 | End: 2021-05-18 | Stop reason: HOSPADM

## 2021-05-17 RX ORDER — FUROSEMIDE 20 MG/1
20 TABLET ORAL 2 TIMES DAILY
Status: DISCONTINUED | OUTPATIENT
Start: 2021-05-17 | End: 2021-05-18 | Stop reason: HOSPADM

## 2021-05-17 RX ORDER — ATORVASTATIN CALCIUM 40 MG/1
40 TABLET, FILM COATED ORAL DAILY
Status: DISCONTINUED | OUTPATIENT
Start: 2021-05-17 | End: 2021-05-18 | Stop reason: HOSPADM

## 2021-05-17 RX ORDER — ISOSORBIDE MONONITRATE 30 MG/1
30 TABLET, EXTENDED RELEASE ORAL DAILY
Status: DISCONTINUED | OUTPATIENT
Start: 2021-05-17 | End: 2021-05-18 | Stop reason: HOSPADM

## 2021-05-17 RX ORDER — PROMETHAZINE HYDROCHLORIDE 25 MG/1
12.5 TABLET ORAL EVERY 6 HOURS PRN
Status: DISCONTINUED | OUTPATIENT
Start: 2021-05-17 | End: 2021-05-18 | Stop reason: HOSPADM

## 2021-05-17 RX ORDER — ROPINIROLE 0.25 MG/1
0.25 TABLET, FILM COATED ORAL NIGHTLY
Status: DISCONTINUED | OUTPATIENT
Start: 2021-05-17 | End: 2021-05-18 | Stop reason: HOSPADM

## 2021-05-17 RX ORDER — OXYCODONE AND ACETAMINOPHEN 10; 325 MG/1; MG/1
1 TABLET ORAL EVERY 4 HOURS PRN
Status: DISCONTINUED | OUTPATIENT
Start: 2021-05-17 | End: 2021-05-18 | Stop reason: HOSPADM

## 2021-05-17 RX ORDER — FLUOXETINE 10 MG/1
10 CAPSULE ORAL DAILY
Status: DISCONTINUED | OUTPATIENT
Start: 2021-05-17 | End: 2021-05-17 | Stop reason: CLARIF

## 2021-05-17 RX ORDER — SODIUM CHLORIDE 0.9 % (FLUSH) 0.9 %
10 SYRINGE (ML) INJECTION PRN
Status: DISCONTINUED | OUTPATIENT
Start: 2021-05-17 | End: 2021-05-18 | Stop reason: HOSPADM

## 2021-05-17 RX ORDER — FAMOTIDINE 20 MG/1
10 TABLET, FILM COATED ORAL 2 TIMES DAILY
Status: DISCONTINUED | OUTPATIENT
Start: 2021-05-17 | End: 2021-05-18 | Stop reason: HOSPADM

## 2021-05-17 RX ORDER — SENNA PLUS 8.6 MG/1
1 TABLET ORAL DAILY PRN
Status: DISCONTINUED | OUTPATIENT
Start: 2021-05-17 | End: 2021-05-18 | Stop reason: HOSPADM

## 2021-05-17 RX ORDER — PHYTONADIONE 5 MG/1
5 TABLET ORAL ONCE
Status: COMPLETED | OUTPATIENT
Start: 2021-05-17 | End: 2021-05-17

## 2021-05-17 RX ORDER — ONDANSETRON 2 MG/ML
4 INJECTION INTRAMUSCULAR; INTRAVENOUS EVERY 6 HOURS PRN
Status: DISCONTINUED | OUTPATIENT
Start: 2021-05-17 | End: 2021-05-18 | Stop reason: HOSPADM

## 2021-05-17 RX ORDER — POTASSIUM CHLORIDE 20 MEQ/1
40 TABLET, EXTENDED RELEASE ORAL PRN
Status: DISCONTINUED | OUTPATIENT
Start: 2021-05-17 | End: 2021-05-18 | Stop reason: HOSPADM

## 2021-05-17 RX ORDER — SODIUM CHLORIDE 0.9 % (FLUSH) 0.9 %
10 SYRINGE (ML) INJECTION EVERY 12 HOURS SCHEDULED
Status: DISCONTINUED | OUTPATIENT
Start: 2021-05-17 | End: 2021-05-18 | Stop reason: HOSPADM

## 2021-05-17 RX ORDER — IPRATROPIUM BROMIDE AND ALBUTEROL SULFATE 2.5; .5 MG/3ML; MG/3ML
1 SOLUTION RESPIRATORY (INHALATION) EVERY 4 HOURS PRN
Status: DISCONTINUED | OUTPATIENT
Start: 2021-05-17 | End: 2021-05-18 | Stop reason: HOSPADM

## 2021-05-17 RX ORDER — SODIUM CHLORIDE 9 MG/ML
25 INJECTION, SOLUTION INTRAVENOUS PRN
Status: DISCONTINUED | OUTPATIENT
Start: 2021-05-17 | End: 2021-05-18 | Stop reason: HOSPADM

## 2021-05-17 RX ORDER — POTASSIUM CHLORIDE 7.45 MG/ML
10 INJECTION INTRAVENOUS PRN
Status: DISCONTINUED | OUTPATIENT
Start: 2021-05-17 | End: 2021-05-18 | Stop reason: HOSPADM

## 2021-05-17 RX ORDER — DOCUSATE SODIUM 100 MG/1
100 CAPSULE, LIQUID FILLED ORAL 2 TIMES DAILY
Status: DISCONTINUED | OUTPATIENT
Start: 2021-05-17 | End: 2021-05-18 | Stop reason: HOSPADM

## 2021-05-17 RX ORDER — ACETAMINOPHEN 325 MG/1
650 TABLET ORAL EVERY 6 HOURS PRN
Status: DISCONTINUED | OUTPATIENT
Start: 2021-05-17 | End: 2021-05-18 | Stop reason: HOSPADM

## 2021-05-17 RX ORDER — POLYETHYLENE GLYCOL 3350 17 G/17G
17 POWDER, FOR SOLUTION ORAL DAILY PRN
Status: DISCONTINUED | OUTPATIENT
Start: 2021-05-17 | End: 2021-05-18 | Stop reason: HOSPADM

## 2021-05-17 RX ADMIN — Medication 10 ML: at 21:53

## 2021-05-17 RX ADMIN — PHYTONADIONE 5 MG: 5 TABLET ORAL at 18:41

## 2021-05-17 ASSESSMENT — ENCOUNTER SYMPTOMS
EYE REDNESS: 0
VOMITING: 0
SINUS PRESSURE: 0
SHORTNESS OF BREATH: 0
BACK PAIN: 0
WHEEZING: 0
NAUSEA: 0
COUGH: 0
DIARRHEA: 0
SORE THROAT: 0
EYE PAIN: 0
EYE DISCHARGE: 0
ABDOMINAL DISTENTION: 0

## 2021-05-17 ASSESSMENT — PAIN SCALES - GENERAL: PAINLEVEL_OUTOF10: 0

## 2021-05-17 NOTE — ED PROVIDER NOTES
Batool Valdovinos is a 80 y.o. 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  The complaint has been persistent, moderate in severity, and worsened by medication use. The patient states that she was told be her home health nurse to present to the ER to evaluation. The patient states that she had an elevated \"coumadin level\" and was told to present to the ER. Patient currently denies any chest pain, shortness of breath, nausea, vomiting, dizziness, lightheadedness, hematuria, blood in the stool. She denies any bleeding at this time. The history is provided by the patient and medical records. Review of Systems   Constitutional: Negative for chills and fever. HENT: Negative for ear pain, sinus pressure and sore throat. Eyes: Negative for pain, discharge and redness. Respiratory: Negative for cough, shortness of breath and wheezing. Cardiovascular: Negative for chest pain. Gastrointestinal: Negative for abdominal distention, diarrhea, nausea and vomiting. Genitourinary: Negative for dysuria and frequency. Musculoskeletal: Negative for arthralgias and back pain. Skin: Negative for rash and wound. Neurological: Negative for weakness and headaches. Hematological: Negative for adenopathy. All other systems reviewed and are negative. Physical Exam  Vitals and nursing note reviewed. Constitutional:       General: She is not in acute distress. Appearance: Normal appearance. She is well-developed. She is not ill-appearing. HENT:      Head: Normocephalic and atraumatic. Right Ear: External ear normal.      Left Ear: External ear normal.   Eyes:      General:         Right eye: No discharge. Left eye: No discharge. Extraocular Movements: Extraocular movements intact.       Conjunctiva/sclera: Conjunctivae normal. CHF (congestive heart failure) (HCC), Chronic pain syndrome, HTN (hypertension), Hx of blood clots, and Hyperlipemia. Past Surgical History:  has a past surgical history that includes transesophageal echocardiogram (11/04/15); back surgery (2010); eye surgery (Bilateral); Colonoscopy; Hysterectomy; Tonsillectomy; Fixation Kyphoplasty (10/02/2017); Fixation Kyphoplasty (11/20/2017); and Hysterectomy, total abdominal.    Social History:  reports that she quit smoking about 41 years ago. Her smoking use included cigarettes. She started smoking about 69 years ago. She smoked 0.25 packs per day. She has never used smokeless tobacco. She reports that she does not drink alcohol and does not use drugs. Family History: family history includes Breast Cancer (age of onset: [de-identified]) in her mother; Cancer in her sister; Heart Disease in her father. The patients home medications have been reviewed. Allergies: Patient has no known allergies.     -------------------------------------------------- RESULTS -------------------------------------------------    LABS:  Results for orders placed or performed during the hospital encounter of 69/69/23   Basic Metabolic Panel w/ Reflex to MG   Result Value Ref Range    Sodium 141 132 - 146 mmol/L    Potassium reflex Magnesium 4.5 3.5 - 5.0 mmol/L    Chloride 107 98 - 107 mmol/L    CO2 29 22 - 29 mmol/L    Anion Gap 5 (L) 7 - 16 mmol/L    Glucose 82 74 - 99 mg/dL    BUN 20 6 - 23 mg/dL    CREATININE 1.1 (H) 0.5 - 1.0 mg/dL    GFR Non-African American 47 >=60 mL/min/1.73    GFR African American 57     Calcium 8.8 8.6 - 10.2 mg/dL   CBC Auto Differential   Result Value Ref Range    WBC 5.6 4.5 - 11.5 E9/L    RBC 4.26 3.50 - 5.50 E12/L    Hemoglobin 13.2 11.5 - 15.5 g/dL    Hematocrit 44.1 34.0 - 48.0 %    .5 (H) 80.0 - 99.9 fL    MCH 31.0 26.0 - 35.0 pg    MCHC 29.9 (L) 32.0 - 34.5 %    RDW 15.9 (H) 11.5 - 15.0 fL    Platelets 684 923 - 641 E9/L    MPV 10.6 7.0 - 12.0 fL Neutrophils % 67.8 43.0 - 80.0 %    Immature Granulocytes % 0.4 0.0 - 5.0 %    Lymphocytes % 19.1 (L) 20.0 - 42.0 %    Monocytes % 11.3 2.0 - 12.0 %    Eosinophils % 0.9 0.0 - 6.0 %    Basophils % 0.5 0.0 - 2.0 %    Neutrophils Absolute 3.79 1.80 - 7.30 E9/L    Immature Granulocytes # 0.02 E9/L    Lymphocytes Absolute 1.07 (L) 1.50 - 4.00 E9/L    Monocytes Absolute 0.63 0.10 - 0.95 E9/L    Eosinophils Absolute 0.05 0.05 - 0.50 E9/L    Basophils Absolute 0.03 0.00 - 0.20 E9/L   Troponin   Result Value Ref Range    Troponin <0.01 0.00 - 0.03 ng/mL   Brain Natriuretic Peptide   Result Value Ref Range    Pro-BNP 4,602 (H) 0 - 450 pg/mL   Protime-INR   Result Value Ref Range    Protime 119.9 (H) 9.3 - 12.4 sec    INR >10.0 ()    APTT   Result Value Ref Range    aPTT 55.3 (H) 24.5 - 35.1 sec   Comprehensive Metabolic Panel w/ Reflex to MG   Result Value Ref Range    Sodium 139 132 - 146 mmol/L    Potassium reflex Magnesium 4.5 3.5 - 5.0 mmol/L    Chloride 105 98 - 107 mmol/L    CO2 28 22 - 29 mmol/L    Anion Gap 6 (L) 7 - 16 mmol/L    Glucose 102 (H) 74 - 99 mg/dL    BUN 19 6 - 23 mg/dL    CREATININE 1.1 (H) 0.5 - 1.0 mg/dL    GFR Non-African American 47 >=60 mL/min/1.73    GFR African American 57     Calcium 9.0 8.6 - 10.2 mg/dL    Total Protein 6.9 6.4 - 8.3 g/dL    Albumin 3.3 (L) 3.5 - 5.2 g/dL    Total Bilirubin 1.0 0.0 - 1.2 mg/dL    Alkaline Phosphatase 106 (H) 35 - 104 U/L    ALT 11 0 - 32 U/L    AST 23 0 - 31 U/L   CBC auto differential   Result Value Ref Range    WBC 6.5 4.5 - 11.5 E9/L    RBC 4.30 3.50 - 5.50 E12/L    Hemoglobin 13.2 11.5 - 15.5 g/dL    Hematocrit 43.9 34.0 - 48.0 %    .1 (H) 80.0 - 99.9 fL    MCH 30.7 26.0 - 35.0 pg    MCHC 30.1 (L) 32.0 - 34.5 %    RDW 15.8 (H) 11.5 - 15.0 fL    Platelets 137 349 - 630 E9/L    MPV 11.0 7.0 - 12.0 fL    Neutrophils % 61.6 43.0 - 80.0 %    Immature Granulocytes % 0.3 0.0 - 5.0 %    Lymphocytes % 23.5 20.0 - 42.0 %    Monocytes % 12.7 (H) 2.0 - 12.0 % Eosinophils % 1.1 0.0 - 6.0 %    Basophils % 0.8 0.0 - 2.0 %    Neutrophils Absolute 3.99 1.80 - 7.30 E9/L    Immature Granulocytes # 0.02 E9/L    Lymphocytes Absolute 1.52 1.50 - 4.00 E9/L    Monocytes Absolute 0.82 0.10 - 0.95 E9/L    Eosinophils Absolute 0.07 0.05 - 0.50 E9/L    Basophils Absolute 0.05 0.00 - 0.20 E9/L   Protime-INR   Result Value Ref Range    Protime 61.5 (H) 9.3 - 12.4 sec    INR 5.5 (HH)    EKG 12 Lead   Result Value Ref Range    Ventricular Rate 69 BPM    Atrial Rate 80 BPM    QRS Duration 96 ms    Q-T Interval 452 ms    QTc Calculation (Bazett) 484 ms    R Axis -58 degrees    T Axis 98 degrees       RADIOLOGY:  XR CHEST PORTABLE   Final Result   1. Stable cardiomegaly. 2.  Interstitial prominence increased compared to prior which could indicate   pulmonary vascular congestion. EKG:  This EKG is signed and interpreted by me. Rate: 69  Rhythm: Junctional  Interpretation:  Rhythm, left axis deviation, nonspecific T waves, QRS 96, QTc 44  Comparison: changes compared to previous EKG      ------------------------- NURSING NOTES AND VITALS REVIEWED ---------------------------  Date / Time Roomed:  5/17/2021  5:22 PM  ED Bed Assignment:  [id number]    The nursing notes within the ED encounter and vital signs as below have been reviewed. No data found. Oxygen Saturation Interpretation: Normal    ------------------------------------------ PROGRESS NOTES ------------------------------------------  Counseling:  I have spoken with the patient and discussed todays results, in addition to providing specific details for the plan of care and counseling regarding the diagnosis and prognosis. Their questions are answered at this time and they are agreeable with the plan of admission.    --------------------------------- ADDITIONAL PROVIDER NOTES ---------------------------------  Consultations:  Time: 1843. Spoke with Dr. Enzo Knott. Discussed case.   They will admit the patient. This patient's ED course included: a personal history and physicial examination, re-evaluation prior to disposition, multiple bedside re-evaluations, cardiac monitoring, continuous pulse oximetry and complex medical decision making and emergency management    This patient has remained hemodynamically stable during their ED course. Diagnosis:  1. Supratherapeutic INR        Disposition:  Patient's disposition: Admit to telemetry  Patient's condition is stable.          Quin Díaz DO  Resident  05/19/21 2006

## 2021-05-17 NOTE — H&P
Internal Medicine History & Physical     Name: Stephanie Joy  : 3/1/1932  Chief Complaint: Coagulation Disorder (sent in by pcp for inr of 8.0)  Primary Care Physician: Tj Zuniga MD  Admission date: 2021  Date of service: 2021     History of Present Illness  Caitlin Walden is a 80y.o. year old female with significant past medical history of CHF, hypertension, hyperlipidemia, CAD, osteoarthritis, COPD, DVT of right upper extremity and abdominal aortic aneurysm. She told me she presented to ER because her Coumadin level was too high. According to the notes. Home health had reported elevated INR and advised patient to present to ER. Patient is intermittently confused and told me she was not even home yesterday. She denied any new bruises or hematuria. In ER she was noted to have .9 INR greater than 10 and PTT of 55.3. Hemoglobin was stable at 13. 2. proBNP was noted to be 4602. CXR demonstrated increased pulmonary vascular congestion. She was administered Phytonadione 5 mg orally. Today she is assessed while lying in bed. Reports she is feeling fine and would like to know if she is \"allowed to go home\". She is noted to have an area of purpura to her R hand--states her skin is thin and \"if I bumps it, it bleeds\". She continues to deny hematuria, hematochezia or melena. There are no family or friends at bedside. The history is provided by patient along with review of the medical record as patient is felt to be a fair historian. Upon discharge, will have home health continue to follow her and assess PT/INR daily for 7 days with results to go to her PCP--Dr Tj Zuniga. ED course:   Initial blood work and imaging studies performed. Admission recommended by ED physician. Case discussed with ED provider.  Meds in ED consisted of the following:  Medications   phytonadione (VITAMIN K) tablet 5 mg (5 mg Oral Given 21)       Past Medical History:   Diagnosis Date    AAA (abdominal aortic aneurysm) (HCC)     Anticoagulant long-term use 5/29/2019    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)     CAD (coronary artery disease)     CHF (congestive heart failure) (HCC)     Chronic pain syndrome 5/29/2019    HTN (hypertension)     Hx of blood clots 2015    right upper arm    Hyperlipemia        Past Surgical History:   Procedure Laterality Date    BACK SURGERY  2010    [facility] for sciatica, pt unsure    COLONOSCOPY      EYE SURGERY Bilateral     cataracts    FIXATION KYPHOPLASTY  10/02/2017    T12 kyphoplasty Dr Kenia Wilson KYPHOPLASTY  11/20/2017    T11-L1 kypho    HYSTERECTOMY      HYSTERECTOMY, TOTAL ABDOMINAL      TONSILLECTOMY      TRANSESOPHAGEAL ECHOCARDIOGRAM  11/04/15       Family History   Problem Relation Age of Onset    Breast Cancer Mother [de-identified]    Heart Disease Father     Cancer Sister         melanoma       Social History  Patient lives at home with her . Employment: reports had worked at [employer]  Illicit drug use- denies  TOBACCO:   reports that she quit smoking about 41 years ago. Her smoking use included cigarettes. She started smoking about 69 years ago. She smoked 0.25 packs per day. She has never used smokeless tobacco.  ETOH:   reports no history of alcohol use. Home Medications  Prior to Admission medications    Medication Sig Start Date End Date Taking? Authorizing Provider   warfarin (COUMADIN) 2.5 MG tablet Take 2.5 mg by mouth three times a week [id number]-[id number] in morning   Yes Historical Provider, MD   rOPINIRole (REQUIP) 0.25 MG tablet Take 1 pill a couple hours before bedtime.  3/5/21  Yes Brian Salcedo MD   warfarin (COUMADIN) 5 MG tablet Take 5 mg by mouth four times a week [id number] in mornings   Yes Historical Provider, MD   atorvastatin (LIPITOR) 40 MG tablet Take 1 tablet by mouth daily 1/27/21  Yes Brian Salcedo MD   furosemide (LASIX) 20 MG tablet Take 1 tablet by mouth 2 times daily 1/27/21  Yes Leonel Real MD   FLUoxetine (PROZAC) 10 MG capsule Take 1 capsule by mouth daily 1/27/21  Yes Leonel Real MD   isosorbide mononitrate (IMDUR) 30 MG extended release tablet Take 1 tablet by mouth daily Take morning of surgery with a sip of water 1/27/21  Yes Leonel Real MD   lisinopril (ZESTRIL) 2.5 MG tablet Take 1 tablet by mouth daily 1/27/21  Yes Leonel Real MD   potassium chloride (KLOR-CON M) 20 MEQ TBCR extended release tablet Take 1 tablet by mouth 2 times daily 1/27/21  Yes Leonel Real MD   Cyanocobalamin (VITAMIN B-12) 5000 MCG TBDP Take 5,000 mcg by mouth daily    Yes Historical Provider, MD   Lactobacillus (PROBIOTIC ACIDOPHILUS PO) Take 1 capsule by mouth daily    Yes Historical Provider, MD   docusate sodium (COLACE) 100 MG capsule Take 100 mg by mouth 2 times daily    Yes Historical Provider, MD   oxyCODONE-acetaminophen (PERCOCET)  MG per tablet Take 1 tablet by mouth every 4 hours as needed for Pain for up to 30 days. 5/10/21 6/9/21  Leonel Real MD   polyethylene glycol (GLYCOLAX) 17 g packet Take 17 g by mouth daily as needed for Constipation    Historical Provider, MD   budesonide (PULMICORT) 0.5 MG/2ML nebulizer suspension INHALE THE CONTENTS OF ONE VIAL (2ML) VIA NEBULIZER TWO TIMES A DAY 9/29/20   Historical Provider, MD   ipratropium-albuterol (DUONEB) 0.5-2.5 (3) MG/3ML SOLN nebulizer solution INHALE THE CONTENTS OF 1 VIAL (3ML) VIA NEBULIZER 3 TIMES A DAY AND AS NEEDED FOR DYSPNEA  COUGH OR WHEEZING 9/29/20   Historical Provider, MD   nystatin (MYCOSTATIN) 046532 UNIT/GM powder Apply 3 times daily. Patient taking differently: Apply topically 3 times daily as needed  5/20/20   Leonel Real MD       Allergies  No Known Allergies    Review of Systems  Please see HPI above. All bolded are positive. All un-bolded are negative.   Constitutional Symptoms: fever, chills, fatigue, generalized weakness, diaphoresis, increase in thirst, loss of appetite  Eyes: vision change   Ears, Nose, Mouth, Throat: hearing loss, nasal congestion, sores in the mouth  Cardiovascular: chest pain, chest heaviness, palpitations  Respiratory: shortness of breath, wheezing, coughing  Gastrointestinal: abdominal pain, nausea, vomiting, diarrhea, constipation, melena, hematochezia, hematemesis  Genitourinary: dysuria, hematuria, increased frequency  Musculoskeletal: lower extremity edema, myalgias, arthralgias, back pain  Integumentary: rashes, itching   Neurological: headache, lightheadedness, dizziness, confusion, syncope, numbness, tingling, focal weakness  Psychiatric: depression, suicidal ideation, anxiety  Endocrine: unintentional weight change  Hematologic/Lymphatic: lymphadenopathy, easy bruising, easy bleeding   Allergic/Immunologic: recurrent infections      Objective  VITALS:  BP (!) 161/78   Pulse 75   Temp 98.7 °F (37.1 °C) (Oral)   Resp 18   Ht 5' 5\" (1.651 m)   Wt 141 lb 8 oz (64.2 kg)   SpO2 93%   BMI 23.55 kg/m²     Physical Exam:  General: awake, alert, oriented to person, place, time with intermittent periods of confusion, appears stated age, cooperative, no acute distress, pleasant, appropriate mood  Eyes: conjunctivae/corneas clear, sclera non icteric, EOMI  Ears: no obvious scars, no lesions, no masses, hearing intact  Mouth: mucous membranes moist, no obvious oral sores,  Head: normocephalic, atraumatic  Neck: no JVD, no adenopathy, no thyromegaly, neck is supple, trachea is midline  Back: ROM normal, no CVA tenderness.   Chest: no pain on palpation  Lungs: clear to auscultation bilaterally, without rhonchi, crackle, wheezing, or rale, no retractions or use of accessory muscles  Heart: regular rate and regular rhythm, no murmur, normal S1, S2  Abdomen: soft, non-tender; bowel sounds normal; no masses, no organomegaly  : Deferred   Extremities: Noted to have area of purpura to R hand,trace lower extremity edema, extremities atraumatic, no cyanosis, no clubbing, 2+ pedal pulses palpated  Skin: normal color, normal texture, normal turgor, no rashes, no lesions  Neurologic:5/5 muscle strength throughout, normal muscle tone throughout, face symmetric, hearing intact, tongue midline, speech appropriate without slurring, sensation to fine touch intact in upper and lower extremities    Labs-   Lab Results   Component Value Date    WBC 6.5 05/18/2021    HGB 13.2 05/18/2021    HCT 43.9 05/18/2021     05/18/2021     05/18/2021    K 4.5 05/18/2021     05/18/2021    CREATININE 1.1 (H) 05/18/2021    BUN 19 05/18/2021    CO2 28 05/18/2021    GLUCOSE 102 (H) 05/18/2021    ALT 11 05/18/2021    AST 23 05/18/2021    INR 5.5 (HH) 05/18/2021     Lab Results   Component Value Date    TROPONINI <0.01 05/17/2021       Recent Radiological Studies:  XR CHEST PORTABLE   Final Result   1. Stable cardiomegaly. 2.  Interstitial prominence increased compared to prior which could indicate   pulmonary vascular congestion.              Assessment  Active Hospital Problems    Diagnosis     Elevated INR [R79.1]      Priority: High    Anticoagulant long-term use [Z79.01]      Priority: Medium    Hx of blood clots [Z86.718]      Priority: Medium    Chronic obstructive pulmonary disease (HCC) [J44.9]     Chronic pain syndrome [G89.4]     Hyperlipemia [E78.5]     Essential hypertension [I10]     CAD (coronary artery disease) [I25.10]     AAA [facility]abdominal aortic aneurysm[facility] ([facility]) [I71.4]     Osteoarthrosis [M19.90]        Patient Active Problem List    Diagnosis Date Noted    Elevated INR 05/17/2021     Priority: High    Anticoagulant long-term use 05/29/2019     Priority: Medium    Hx of blood clots 2015     Priority: Medium     right upper arm      Chronic obstructive pulmonary disease ([facility] [address] 05/10/2021    Chronic pain syndrome 05/29/2019    Calculus of kidney 05/24/2017    CHF (congestive heart failure) ([facility] [address]     Essential hypertension     Hyperlipemia     AAA [facility]abdominal aortic aneurysm) ([facility] [address]     CAD (coronary artery disease)     Osteoarthrosis 08/18/2004       Plan  · Supratherapeutic INR: 5 mg p.o. vitamin K given in ED. Follow INR (daily as an OP w/ [facility] x 7 days). Coumadin on hold (h/o DVT). · Hx of CHF: Pro-BNP 4,602; CXR noted  · Continue home medications other than coumadin. · PT/OT  · Follow labs  · DVT prophylaxis. · Please see orders for further management and care. ·  for discharge planning  · Discharge plan: Home today     [facility] APRN-CNP  5/18/2021  5:50 AM    I can be reached through [employer] [facility]. NOTE:  This report was transcribed using voice recognition software. Every effort was made to ensure accuracy; however, inadvertent computerized transcription errors may be present. Addendum: I have personally participated in a face-to-face history and physical exam on the date of service with the patient. I have discussed the case with the nurse practitioner. I also participated in medical decision making on the date of service and I agree with all of the pertinent clinical information unless indicated in my editing of the note. I have reviewed and edited the note above based on my findings during my history, exam, and decision making on the same day of service. My additional thoughts:   6 [facility] to follow INR as an outpatient   Continue to hold Coumadin    Electronically signed by Chuck Padgett DO on 5/18/2021 at 12:04 PM    I can be reached through [employer] Doctor.

## 2021-05-18 VITALS
TEMPERATURE: 97.6 F | SYSTOLIC BLOOD PRESSURE: 117 MMHG | HEART RATE: 68 BPM | HEIGHT: 65 IN | BODY MASS INDEX: 23.57 KG/M2 | RESPIRATION RATE: 18 BRPM | DIASTOLIC BLOOD PRESSURE: 67 MMHG | WEIGHT: 141.5 LBS | OXYGEN SATURATION: 93 %

## 2021-05-18 LAB
ALBUMIN SERPL-MCNC: 3.3 G/DL (ref 3.5–5.2)
ALP BLD-CCNC: 106 U/L (ref 35–104)
ALT SERPL-CCNC: 11 U/L (ref 0–32)
ANION GAP SERPL CALCULATED.3IONS-SCNC: 6 MMOL/L (ref 7–16)
AST SERPL-CCNC: 23 U/L (ref 0–31)
BASOPHILS ABSOLUTE: 0.05 E9/L (ref 0–0.2)
BASOPHILS RELATIVE PERCENT: 0.8 % (ref 0–2)
BILIRUB SERPL-MCNC: 1 MG/DL (ref 0–1.2)
BUN BLDV-MCNC: 19 MG/DL (ref 6–23)
CALCIUM SERPL-MCNC: 9 MG/DL (ref 8.6–10.2)
CHLORIDE BLD-SCNC: 105 MMOL/L (ref 98–107)
CO2: 28 MMOL/L (ref 22–29)
CREAT SERPL-MCNC: 1.1 MG/DL (ref 0.5–1)
EKG ATRIAL RATE: 80 BPM
EKG Q-T INTERVAL: 452 MS
EKG QRS DURATION: 96 MS
EKG QTC CALCULATION (BAZETT): 484 MS
EKG R AXIS: -58 DEGREES
EKG T AXIS: 98 DEGREES
EKG VENTRICULAR RATE: 69 BPM
EOSINOPHILS ABSOLUTE: 0.07 E9/L (ref 0.05–0.5)
EOSINOPHILS RELATIVE PERCENT: 1.1 % (ref 0–6)
GFR AFRICAN AMERICAN: 57
GFR NON-AFRICAN AMERICAN: 47 ML/MIN/1.73
GLUCOSE BLD-MCNC: 102 MG/DL (ref 74–99)
HCT VFR BLD CALC: 43.9 % (ref 34–48)
HEMOGLOBIN: 13.2 G/DL (ref 11.5–15.5)
IMMATURE GRANULOCYTES #: 0.02 E9/L
IMMATURE GRANULOCYTES %: 0.3 % (ref 0–5)
INR BLD: 5.5
LYMPHOCYTES ABSOLUTE: 1.52 E9/L (ref 1.5–4)
LYMPHOCYTES RELATIVE PERCENT: 23.5 % (ref 20–42)
MCH RBC QN AUTO: 30.7 PG (ref 26–35)
MCHC RBC AUTO-ENTMCNC: 30.1 % (ref 32–34.5)
MCV RBC AUTO: 102.1 FL (ref 80–99.9)
MONOCYTES ABSOLUTE: 0.82 E9/L (ref 0.1–0.95)
MONOCYTES RELATIVE PERCENT: 12.7 % (ref 2–12)
NEUTROPHILS ABSOLUTE: 3.99 E9/L (ref 1.8–7.3)
NEUTROPHILS RELATIVE PERCENT: 61.6 % (ref 43–80)
PDW BLD-RTO: 15.8 FL (ref 11.5–15)
PLATELET # BLD: 200 E9/L (ref 130–450)
PMV BLD AUTO: 11 FL (ref 7–12)
POTASSIUM REFLEX MAGNESIUM: 4.5 MMOL/L (ref 3.5–5)
PROTHROMBIN TIME: 61.5 SEC (ref 9.3–12.4)
RBC # BLD: 4.3 E12/L (ref 3.5–5.5)
SODIUM BLD-SCNC: 139 MMOL/L (ref 132–146)
TOTAL PROTEIN: 6.9 G/DL (ref 6.4–8.3)
WBC # BLD: 6.5 E9/L (ref 4.5–11.5)

## 2021-05-18 PROCEDURE — G0378 HOSPITAL OBSERVATION PER HR: HCPCS

## 2021-05-18 PROCEDURE — 36415 COLL VENOUS BLD VENIPUNCTURE: CPT

## 2021-05-18 PROCEDURE — 85610 PROTHROMBIN TIME: CPT

## 2021-05-18 PROCEDURE — 97165 OT EVAL LOW COMPLEX 30 MIN: CPT

## 2021-05-18 PROCEDURE — 80053 COMPREHEN METABOLIC PANEL: CPT

## 2021-05-18 PROCEDURE — 85025 COMPLETE CBC W/AUTO DIFF WBC: CPT

## 2021-05-18 PROCEDURE — 6370000000 HC RX 637 (ALT 250 FOR IP): Performed by: INTERNAL MEDICINE

## 2021-05-18 PROCEDURE — 97161 PT EVAL LOW COMPLEX 20 MIN: CPT

## 2021-05-18 PROCEDURE — 2580000003 HC RX 258: Performed by: INTERNAL MEDICINE

## 2021-05-18 PROCEDURE — 93010 ELECTROCARDIOGRAM REPORT: CPT | Performed by: INTERNAL MEDICINE

## 2021-05-18 RX ADMIN — DOCUSATE SODIUM 100 MG: 100 CAPSULE, LIQUID FILLED ORAL at 08:16

## 2021-05-18 RX ADMIN — ISOSORBIDE MONONITRATE 30 MG: 30 TABLET, EXTENDED RELEASE ORAL at 08:15

## 2021-05-18 RX ADMIN — Medication 10 ML: at 08:15

## 2021-05-18 RX ADMIN — ATORVASTATIN CALCIUM 40 MG: 40 TABLET, FILM COATED ORAL at 08:16

## 2021-05-18 RX ADMIN — LISINOPRIL 2.5 MG: 5 TABLET ORAL at 08:15

## 2021-05-18 RX ADMIN — FUROSEMIDE 20 MG: 20 TABLET ORAL at 08:16

## 2021-05-18 RX ADMIN — FAMOTIDINE 10 MG: 20 TABLET ORAL at 08:16

## 2021-05-18 RX ADMIN — FLUOXETINE HYDROCHLORIDE 10 MG: 10 TABLET ORAL at 08:25

## 2021-05-18 RX ADMIN — POTASSIUM CHLORIDE 20 MEQ: 1500 TABLET, EXTENDED RELEASE ORAL at 08:16

## 2021-05-18 NOTE — PROGRESS NOTES
Occupational Therapy  OCCUPATIONAL THERAPY INITIAL EVALUATION      Date:2021  Patient Name: Asya Bright  MRN: [id number]  : 3/1/1932  Room: [address] [place] [address]-A    Referring Provider: Jignesh Ryder DO  Evaluating OT: Zach Breen, OTR/L - [id number]    AM-PAC Daily Activity Raw Score: 18/24    Recommended Adaptive Equipment: Continue to assess. Diagnosis: Elevated INR [R79.1]      Pertinent Medical History: CAD, CHF, chronic pain syndrome, HTN      Precautions: falls, bed alarm    Home Living: Patient lives with her  and granddaughter in a one-floor home; laundry is in the basement. Bathroom Setup: tub shower (with seat and grab bar) and standard-height toilet    Prior Level of Function (PLOF): Patient reported that she was mostly independent with ADLs (patient's  provides SBA/supervision with showers), but needed assistance with IADLs. Patient was independent with functional mobility (without device) prior to this hospitalization. Pain Level: No complaints of pain. Cognition: Patient alert and oriented grossly. WFL command follow demonstrated. Word-finding difficulty demonstrated occasionally. Memory: Fair   Sequencing: Fair   Problem Solving: Fair   Judgement/Safety: Fair    Impulsivity demonstrated. Functional Assessment:   Initial Eval Status  Date: 2021 Treatment Status  Date:  Short Term Goals   Feeding Setup     Grooming Min A   Mod I / Independent  (seated/standing at sink)   UB Dressing SBA  Independent / Mod I   (including item retrieval)   LB Dressing Min A  Mod I / Independent - with use of AE, as needed/appropriate   Bathing Min A  Mod I / Independent - with use of AE/DME, as needed/appropriate   Toileting CGA  Mod I / Independent   Bed Mobility  Not assessed. Functional Transfers Sit-to-Stand: SBA from EOB  Independent   Functional Mobility CGA   (with occasional handheld assistance) within patient's room and bathroom.   Mod I / Independent with functional mobility (with device, as needed/appropriate) in order to maximize independence with ADLs/IADLs and other functional tasks. Balance Sitting: God  (at EOB)  Standing: Fair-  (with occasional handheld assistance)  Good dynamic standing balance during completion of ADLs/IADLs and other functional tasks. Activity Tolerance Fair  Patient will demonstrate Good understanding and consistent implementation of energy conservation techniques and work simplification techniques into ADL routines. Visual/  Perceptual WFL     N/A     Long-Term Goal: Patient will increase functional independence to PLOF in order to allow patient to live in least restrictive environment. Strength: ROM: Additional Information:    R UE  3+/5  WFL    L UE 3+/5  WFL      Hearing: WFL grossly  Sensation: No complaints of numbness/tingling in B UEs. Tone: WFL  Edema: No    Comments: RN approved patient's participation in [address] [place] [address] activities. Upon arrival, patient seated at EOB. At end of session, patient seated at EOB with call light and phone within reach, patient's  present, bed alarm activated, and all lines and tubes intact. Patient would benefit from continued skilled OT to increase safety and independence with completion of ADL/IADL tasks for functional independence and quality of life.      Assessment of Current Deficits:   Functional Mobility [x]  ADLs [x] Strength [x]  Cognition []  Functional Transfers  [x] IADLs [x] Safety Awareness [x]  Endurance [x]  Fine Motor Coordination [] Balance [x] Vision/Perception [] Sensation []   Gross Motor Coordination [] ROM [] Delirium []                  Motor Control []    Plan of Care: 2-5 days/week for 1-2 weeks PRN  [x]ADL retraining/adaptive techniques and AE recommendations to increase functional independence within precautions  [x]Energy conservation techniques to improve tolerance for ADLs/IADLs  [x]Functional transfer/mobility training/DME recommendations for increased independence, safety and fall prevention  [x]Patient/family education to increase safety and functional independence  [x]Environmental modifications for safe mobility and completion of ADLs/IADLs  []Cognitive retraining exercises to improve problem solving skills & safe participation in ADLs/IADLs   []Sensory re-education techniques to improve extremity awareness, maintain skin integrity and improve hand function   []Visual/Perceptual retraining  to improve body awareness and safety during transfers and ADLs   []Splinting/positioning needs to maintain joint/skin integrity and prevent contractures   [x]Therapeutic activity to improve functional performance during ADLs/IADLs  [x]Therapeutic exercise to improve tolerance and functional strength for ADLs/IADLs  [x]Balance retraining/tolerance tasks for facilitation of postural control with dynamic challenges during ADLs   [x]Neuromuscular re-education: facilitate righting/equilibrium reactions, midline orientation, scapular stability/mobility, normalize muscle tone, and facilitate active functional movement/attention  []Delirium prevention/treatment   []Positioning to improve functional independence and prevent skin breakdown   []Other:     Rehab Potential: Good for established goals. Patient / Family Goal: Patient wants to return home. Patient and/or family were instructed on functional diagnosis, prognosis/goals, and OT plan of care. Demonstrated Good understanding.     Eval Complexity: Low    Time In: 1123  Time Out: 1133  Total Treatment Time: 0 minutes      Minutes Units   OT Eval Low 65440 10 1   OT Eval Medium 92429     OT Eval High 85204     OT Re-Eval [id number]     Therapeutic Ex 52451     Therapeutic Activities 60368     ADL/Self Care 98842     Orthotic Management 23442     Neuro Re-Ed 25708     Non-Billable Time N/A ---     Evaluation time includes thorough review of current medical information, gathering information on past medical history/social history and prior level of function, completion of standardized testing/informal observation of tasks, assessment of data, and education on plan of care and goals. Martha Zaragoza, OTR/L  License Number: [id number]

## 2021-05-18 NOTE — PROGRESS NOTES
Database complete. Medications reconciled with daughter Jerica Hobson. Care plans and education initiated. Wears 02 prn. Cardiologist is Dr. Raad Barahona.

## 2021-05-18 NOTE — PATIENT CARE CONFERENCE
P Quality Flow/Interdisciplinary Rounds Progress Note        Quality Flow Rounds held on May 18, 2021    Disciplines Attending:  Bedside Nurse, ,  and Nursing Unit Leadership    Juan Martinez was admitted on 5/17/2021  5:22 PM    Anticipated Discharge Date:  Expected Discharge Date: 05/20/21    Disposition:    Robert Score:  Robert Scale Score: 21    Readmission Risk              Risk of Unplanned Readmission:  0           Discussed patient goal for the day, patient clinical progression, and barriers to discharge.   The following Goal(s) of the Day/Commitment(s) have been identified:  Labs - Report Results      Dov Prater RN  May 18, 2021

## 2021-05-18 NOTE — PROGRESS NOTES
Physical Therapy    Facility/Department: [facility] MED SURG  Initial Assessment    NAME: Jaquelin Degree  : 3/1/1932  MRN: [id number]    Date of Service: 2021      Attending Provider:  No att. providers found    Evaluating PT:  Luh Quick P.T. Room #:  9281/2702-E  Diagnosis:  Elevated INR   Precautions:  Falls, bed/chair alarm     SUBJECTIVE:    Pt lives with  in a 1 story home with 3 stairs and 1 rail to enter. Pt ambulated with ww PRN. OBJECTIVE:   Initial Evaluation  Date: 21 Treatment Short Term/ Long Term   Goals   Was pt agreeable to Eval/treatment? yes     Does pt have pain? No c/o pain     Bed Mobility  Rolling: Independent  Supine to sit: Independent  Sit to supine: NA  Scooting: Independent  Independent   Transfers Sit to stand: SBA  Stand to sit: SBA  Stand pivot: SBA with ww  Independent    Ambulation   60 feet x 2 reps with ww SBA  150 feet with ww Independent    Stair negotiation: ascended and descended NA, pt was fatigued with amb  3 steps with 1 rail Independent   AM-PAC 6 Clicks        BLE ROM is WFL. BLE strength is grossly 4-/5 to 4/5. Sensation:  Pt denies numbness and tingling to extremities  Edema:  None noted  Balance: sitting is Independent and standing with ww is SBA  Endurance: fair    ASSESSMENT:    Comments:  Prior to standing pt doffed her hospital slipper socks and donned her own socks and I assisted her with donning her shoes. She walked with ww in the fortune and after 60 feet needed to sit down on chair and take a rest break due to fatigue. She then stood up and walked back to her room. Pt has decreased strength and endurance that increases her risk for a fall. Pt was left sitting on EOB per her request with call light left by patient and  present. Chair/bed alarm: bed alarm was activated. Pt's/ family goals   1. To go home. Patient and or family understand(s) diagnosis, prognosis, and plan of care.     PLAN OF

## 2021-05-20 ENCOUNTER — ANTI-COAG VISIT (OUTPATIENT)
Dept: FAMILY MEDICINE CLINIC | Age: 86
End: 2021-05-20

## 2021-05-20 LAB — INR BLD: 1.9

## 2021-05-20 NOTE — PROGRESS NOTES
Margo Bundy called Dr Judie Craft with patients INR result of 1.9. He advised the nurse to have her take 2.5mg daily and recheck next week.

## 2021-05-24 ENCOUNTER — ANTI-COAG VISIT (OUTPATIENT)
Dept: FAMILY MEDICINE CLINIC | Age: 86
End: 2021-05-24

## 2021-05-24 LAB — INR BLD: 1.7

## 2021-05-24 NOTE — PROGRESS NOTES
Lois Lockett called Dr Juan Osuna with patients INR result of 1.7. He advised the nurse to have her take 5mg today then 2.5mg M-F, 5mg sat & sun.   Will recheck 1 week

## 2021-06-10 ENCOUNTER — VIRTUAL VISIT (OUTPATIENT)
Dept: FAMILY MEDICINE CLINIC | Age: 86
End: 2021-06-10
Payer: MEDICARE

## 2021-06-10 DIAGNOSIS — Z79.01 ANTICOAGULANT LONG-TERM USE: Primary | ICD-10-CM

## 2021-06-10 DIAGNOSIS — I10 ESSENTIAL HYPERTENSION: ICD-10-CM

## 2021-06-10 DIAGNOSIS — G89.4 CHRONIC PAIN SYNDROME: ICD-10-CM

## 2021-06-10 DIAGNOSIS — J44.9 CHRONIC OBSTRUCTIVE PULMONARY DISEASE, UNSPECIFIED COPD TYPE (HCC): ICD-10-CM

## 2021-06-10 DIAGNOSIS — F11.90 CHRONIC, CONTINUOUS USE OF OPIOIDS: ICD-10-CM

## 2021-06-10 PROCEDURE — 1036F TOBACCO NON-USER: CPT | Performed by: INTERNAL MEDICINE

## 2021-06-10 PROCEDURE — 3023F SPIROM DOC REV: CPT | Performed by: INTERNAL MEDICINE

## 2021-06-10 PROCEDURE — 99213 OFFICE O/P EST LOW 20 MIN: CPT | Performed by: INTERNAL MEDICINE

## 2021-06-10 PROCEDURE — 1123F ACP DISCUSS/DSCN MKR DOCD: CPT | Performed by: INTERNAL MEDICINE

## 2021-06-10 PROCEDURE — 4040F PNEUMOC VAC/ADMIN/RCVD: CPT | Performed by: INTERNAL MEDICINE

## 2021-06-10 PROCEDURE — G8926 SPIRO NO PERF OR DOC: HCPCS | Performed by: INTERNAL MEDICINE

## 2021-06-10 PROCEDURE — G8427 DOCREV CUR MEDS BY ELIG CLIN: HCPCS | Performed by: INTERNAL MEDICINE

## 2021-06-10 PROCEDURE — G8420 CALC BMI NORM PARAMETERS: HCPCS | Performed by: INTERNAL MEDICINE

## 2021-06-10 PROCEDURE — 1090F PRES/ABSN URINE INCON ASSESS: CPT | Performed by: INTERNAL MEDICINE

## 2021-06-10 RX ORDER — OXYCODONE AND ACETAMINOPHEN 10; 325 MG/1; MG/1
1 TABLET ORAL EVERY 8 HOURS PRN
Qty: 90 TABLET | Refills: 0 | Status: SHIPPED | OUTPATIENT
Start: 2021-06-10 | End: 2021-07-10

## 2021-06-10 RX ORDER — OXYCODONE AND ACETAMINOPHEN 10; 325 MG/1; MG/1
1 TABLET ORAL EVERY 4 HOURS PRN
COMMUNITY
End: 2021-06-10 | Stop reason: SDUPTHER

## 2021-06-10 SDOH — ECONOMIC STABILITY: FOOD INSECURITY: WITHIN THE PAST 12 MONTHS, YOU WORRIED THAT YOUR FOOD WOULD RUN OUT BEFORE YOU GOT MONEY TO BUY MORE.: NEVER TRUE

## 2021-06-10 SDOH — ECONOMIC STABILITY: FOOD INSECURITY: WITHIN THE PAST 12 MONTHS, THE FOOD YOU BOUGHT JUST DIDN'T LAST AND YOU DIDN'T HAVE MONEY TO GET MORE.: NEVER TRUE

## 2021-06-10 ASSESSMENT — SOCIAL DETERMINANTS OF HEALTH (SDOH): HOW HARD IS IT FOR YOU TO PAY FOR THE VERY BASICS LIKE FOOD, HOUSING, MEDICAL CARE, AND HEATING?: NOT HARD AT ALL

## 2021-06-10 NOTE — PROGRESS NOTES
visit. She is on Coumadin for arterial embolism and history of DVT. Since I have seen her in the office last she has had a virtual visit and has been in the emergency room because of supratherapeutic INR greater than 10. She was treated with vitamin K and had daily INRs for period of time and then being followed weekly by home health care nursing numbers being called to me. Most recent INR was therapeutic and I had them check it again in 2 weeks this time. We will not be checked this coming Monday but the following Monday. No bleeding or thrombotic episodes. Swelling has been down to her legs. She does complain of a lot of fatigue. She is using her oxygen more regularly. She is at 2 L. Breathing has been pretty good. Continues to use nebulizer for COPD. She has had no falls. Weight is up about 2 pounds and appetite has been good. Lipids have been good on statin medication. Depression has been stable on SSRI. requesting refill on her narcotic medication that she has been on for years.  This for chronic pain syndrome/back pain.  OARRS report was run and okay.  No evidence of abuse or diversion of the medication. Patient is following with urology, podiatry, renal, cardiology, pulmonary and did see hematology/oncology because of macrocytosis    Review of Systems     Constitutional:  negative for activity change, chills, diaphoresis,  and fever.  Positive for fatigue  HENT: Negative for congestion, ear pain, hearing loss, postnasal drip, rhinorrhea and sinus pain.    Respiratory: Positive for shortness of breath-improved post hospitalization for CHF.   Negative for cough and wheezing.         COPD chronic oxygen use   Cardiovascular: Negative for chest pain, palpitations.  Positive for leg swelling.  -Improved post diuresis  Gastrointestinal: Positive for constipation.   Negative for abdominal pain, blood in stool, diarrhea,  and vomiting.         Endocrine: Negative.    Genitourinary: Negative for difficulty urinating, dysuria, hematuria and urgency.         Reports frequent UTIs .does have some incontinence of urine following with urology. Yajaira Fontanze hematuria has been worked up.   Musculoskeletal: Positive for arthralgias.  back pain, and gait problem       Chronic pain syndrome/back pain--lower back pain and bilateral hip pain/chronic-continues on narcotic pain medication with success. T11-L1 vertebroplasty. Successful.   Skin: Negative.    Neurological: Negative for dizziness, weakness, light-headedness, numbness and headaches. Hematological: Negative.    Psychiatric/Behavioral: Admits to being more emotional and angry since taken off of her Prozac.-Placed back on again with improvement  Does appear to be in good today.         REST OF PERTINENT ROS GONE OVER AND WAS NEGATIVE.      Past medical history:  Problem List: Chronic pain syndrome, Long-term current use of anticoagulant, Anticoagulant, Osteoarthritis, Taking  medication, Hyperlipidemia  Health Maintenance:  Mammogram - (1/23/2019)  Mammogram Screening - (1/23/2019)  Influenza Vaccination - (11/21/2018)  Bone Density Scan - (1/11/2011)  Colonoscopy - (11/12/2015)  Couseled on Home Safety - (4/12/2017)  Bone Density Test Screening - (5/23/2014)  Stress Test - 1/08-ok,3/11  EKG - 7/09,11,3/13  Rectal Exam - 8/10  Breast Exam - 8/10,declined,2/14  Hemmocult Cards - 2/13-neg x 3  2D ECHO - 8/15  Mini Mental Status - 4/19--29/30  Medical Problems:  Osteoarthritis  DVT - right-1/04  Hypertension  Lumbar Spondylosis-Herniated Disc-Spinal Stenosis - epidurals/[place]  Hyperlipidemia, YAIR-BSO--Non Cancerous  Small AAA - 3.4cm--- 2/19.--3.5 cm per CT urogram, CT abdomen-920  HX FX Left Ankle, Chronic Greater Trochanteric Bursitis, Depression, Cataracts  Coronary Artery Disease (CAD) - Previously declined cardiology fu  neurogenic claudication  Spinal Stenosis - L2-5 lumbar laminectomy-dr massey,[facility]  Kidney Stones, Anxiety, Diverticulitis, Osteoporosis, post laminectomy syndrome lumbar spine  Goiter - multinodular  hematuria - cystoscopy/retrograde-worked up by urology. urethral stenosis - dialated  chronic urethral inflammation, strep bovis bacteremia, Congestive Heart Failure (CHF), arterial thrombosis right arm  T12 compression fracture. - Vertebroplasty  Lumbar Spondylosis-Herniated Disc-Spinal Stenosis  T11 and L1 kyphoplasty -   Dilated nonischemic cardiomyopathy - Follows with cardiology  Copd  Hospitalization for congestive heart failure volume overload, ascites-  Cirrhosis per CAT scan  Aortic regurgitation-moderate  Pulmonary hypertension  Hospitalization--HFpEF-3/21  ER visit for supratherapeutic INR greater than 10-     Reviewed and updated. FH:  Father:  MI -  late 63's. Mother:  Breast Cancer - ? ?  AGE 80. Son 1:  Chronic Obstructive Pulmonary Disease (COPD) -  age 61. Sister 1:  melanoma -  age in her 52's. Reviewed, no changes. SH:  Marital: . Personal Habits: Cigarette Use: Does Not Smoke. Alcohol: does not use alcohol. Reviewed, no changes.             Current Outpatient Medications:     oxyCODONE-acetaminophen (PERCOCET)  MG per tablet, Take 1 tablet by mouth every 8 hours as needed for Pain for up to 30 days. , Disp: 90 tablet, Rfl: 0    polyethylene glycol (GLYCOLAX) 17 g packet, Take 17 g by mouth daily as needed for Constipation, Disp: , Rfl:     rOPINIRole (REQUIP) 0.25 MG tablet, Take 1 pill a couple hours before bedtime. , Disp: 90 tablet, Rfl: 1    atorvastatin (LIPITOR) 40 MG tablet, Take 1 tablet by mouth daily, Disp: 90 tablet, Rfl: 1    furosemide (LASIX) 20 MG tablet, Take 1 tablet by mouth 2 times daily, Disp: 180 tablet, Rfl: 1    FLUoxetine (PROZAC) 10 MG capsule, Take 1 capsule by mouth daily, Disp: 90 capsule, Rfl: 1    isosorbide mononitrate (IMDUR) 30 MG extended release tablet, Take 1 tablet by mouth daily Take morning of surgery with a sip of water, Disp: 90 tablet, Rfl: 1   lisinopril (ZESTRIL) 2.5 MG tablet, Take 1 tablet by mouth daily, Disp: 90 tablet, Rfl: 1    potassium chloride (KLOR-CON M) 20 MEQ TBCR extended release tablet, Take 1 tablet by mouth 2 times daily, Disp: 180 tablet, Rfl: 1    budesonide (PULMICORT) 0.5 MG/2ML nebulizer suspension, INHALE THE CONTENTS OF ONE VIAL (2ML) VIA NEBULIZER TWO TIMES A DAY, Disp: , Rfl:     ipratropium-albuterol (DUONEB) 0.5-2.5 (3) MG/3ML SOLN nebulizer solution, INHALE THE CONTENTS OF 1 VIAL (3ML) VIA NEBULIZER 3 TIMES A DAY AND AS NEEDED FOR DYSPNEA  COUGH OR WHEEZING, Disp: , Rfl:     Cyanocobalamin (VITAMIN B-12) 5000 MCG TBDP, Take 5,000 mcg by mouth daily , Disp: , Rfl:     Lactobacillus (PROBIOTIC ACIDOPHILUS PO), Take 1 capsule by mouth daily , Disp: , Rfl:     docusate sodium (COLACE) 100 MG capsule, Take 100 mg by mouth 2 times daily , Disp: , Rfl:     nystatin (MYCOSTATIN) 893856 UNIT/GM powder, Apply 3 times daily.  (Patient taking differently: Apply topically 3 times daily as needed ), Disp: 60 g, Rfl: 1  No Known Allergies    Past Medical History:   Diagnosis Date    AAA (abdominal aortic aneurysm) (HCC)     Anticoagulant long-term use 5/29/2019    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)     CAD (coronary artery disease)     CHF (congestive heart failure) (HCC)     Chronic pain syndrome 5/29/2019    HTN (hypertension)     Hx of blood clots 2015    right upper arm    Hyperlipemia      Past Surgical History:   Procedure Laterality Date    BACK SURGERY  2010    [facility] for sciatica, pt unsure    COLONOSCOPY      EYE SURGERY Bilateral     cataracts    FIXATION KYPHOPLASTY  10/02/2017    T12 kyphoplasty Dr Pb Hernandez KYPHOPLASTY  11/20/2017    T11-L1 kypho    HYSTERECTOMY      HYSTERECTOMY, TOTAL ABDOMINAL      TONSILLECTOMY      TRANSESOPHAGEAL ECHOCARDIOGRAM  11/04/15     Family History   Problem Relation Age of Onset    Breast Cancer Mother [de-identified]    Heart Disease Father     Cancer Sister         melanoma     Social History     Socioeconomic History    Marital status:      Spouse name: Not on file    Number of children: Not on file    Years of education: Not on file    Highest education level: Not on file   Occupational History    Not on file   Tobacco Use    Smoking status: Former Smoker     Packs/day: 0.25     Types: Cigarettes     Start date: 1952     Quit date: 1980     Years since quittin.0    Smokeless tobacco: Never Used   Vaping Use    Vaping Use: Never used   Substance and Sexual Activity    Alcohol use: No    Drug use: Never    Sexual activity: Never   Other Topics Concern    Not on file   Social History Narrative    Not on file     Social Determinants of Health     Financial Resource Strain: Low Risk     Difficulty of Paying Living Expenses: Not hard at all   Food Insecurity: No Food Insecurity    Worried About [address] [employer] in the Last Year: Never true    [address] [facility] [employer] in the Last Year: Never true   Transportation Needs:     Lack of Transportation (Medical):  Lack of Transportation (Non-Medical):    Physical Activity:     Days of Exercise per Week:     Minutes of Exercise per Session:    Stress:     Feeling of Stress :    Social Connections:     Frequency of Communication with Friends and Family:     Frequency of Social Gatherings with Friends and Family:     Attends Cheondoism Services:     Active Member of Clubs or Organizations:     Attends Club or Organization Meetings:     Marital Status:    Intimate Partner Violence:     Fear of Current or Ex-Partner:     Emotionally Abused:     Physically Abused:     Sexually Abused: There were no vitals filed for this visit. Physical Exam      Constitutional:       General: She is not in acute distress. HENT:      Head: Normocephalic and atraumatic. Pulmonary:      Effort: Pulmonary effort is normal.   Musculoskeletal: Normal range of motion.          General: Swelling present. Comments: Swelling has improved   Neurological:      Mental Status: She is alert and oriented to person, place, and time. Psychiatric:         Mood and Affect: Mood normal.         Behavior: Behavior normal.          Assessment and Plan:  Margarito Suazo was seen today for hypertension. Diagnoses and all orders for this visit:    Anticoagulant long-term use    Chronic pain syndrome  -     oxyCODONE-acetaminophen (PERCOCET)  MG per tablet; Take 1 tablet by mouth every 8 hours as needed for Pain for up to 30 days. Essential hypertension    Chronic, continuous use of opioids  -     oxyCODONE-acetaminophen (PERCOCET)  MG per tablet; Take 1 tablet by mouth every 8 hours as needed for Pain for up to 30 days. Chronic obstructive pulmonary disease, unspecified COPD type ([facility]      Plan: I will see her back in 1 month for an office regular visit. Look over April note at that time. Continue to use her oxygen and nebulizer. We will continue to have INR checks at home through home health secondary to her homebound status. We will check labs on her next visit. Start monitoring blood pressure and getting weights. Follow with consultants. Fall precautions. Prescription management performed. Notify us of problems in the interim. Return in about 1 month (around 7/10/2021), or in office visit. Seen By:  Oumou Baez MD      *Document was created using voice recognition software. Note was reviewed however may contain grammatical errors.

## 2021-07-08 ENCOUNTER — ANTI-COAG VISIT (OUTPATIENT)
Dept: FAMILY MEDICINE CLINIC | Age: 86
End: 2021-07-08
Payer: MEDICARE

## 2021-07-08 DIAGNOSIS — Z79.01 ANTICOAGULANT LONG-TERM USE: ICD-10-CM

## 2021-07-08 LAB — INR BLD: 1.5

## 2021-07-08 PROCEDURE — 93793 ANTICOAG MGMT PT WARFARIN: CPT | Performed by: INTERNAL MEDICINE

## 2021-07-08 NOTE — PROGRESS NOTES
[place] [facility] called Dr Jonnie Dunlap with patients INR result of 1.5. Dr increased her dose today to 10mg, then she is to resume her regular dose. [address] will recheck in 1 week.

## 2021-07-12 ENCOUNTER — HOSPITAL ENCOUNTER (EMERGENCY)
Dept: HOSPITAL 83 - ED | Age: 86
Discharge: HOME | End: 2021-07-12
Payer: MEDICARE

## 2021-07-12 ENCOUNTER — TELEPHONE (OUTPATIENT)
Dept: FAMILY MEDICINE CLINIC | Age: 86
End: 2021-07-12

## 2021-07-12 VITALS — HEIGHT: 65 IN | WEIGHT: 148 LBS | BODY MASS INDEX: 24.66 KG/M2

## 2021-07-12 VITALS — DIASTOLIC BLOOD PRESSURE: 64 MMHG

## 2021-07-12 DIAGNOSIS — Z98.890: ICD-10-CM

## 2021-07-12 DIAGNOSIS — Z79.01: ICD-10-CM

## 2021-07-12 DIAGNOSIS — M77.31: Primary | ICD-10-CM

## 2021-07-12 DIAGNOSIS — Z79.899: ICD-10-CM

## 2021-07-12 DIAGNOSIS — Z90.711: ICD-10-CM

## 2021-07-12 NOTE — TELEPHONE ENCOUNTER
ANUSHKA JACOB [facility] is calling in to let you know that the patient is cancelling the appt for this afternoon and she will be going to the ED in [facility] because she thinks that she may have a blood clot in her foot. She is very swollen.

## 2021-07-14 ENCOUNTER — ANTI-COAG VISIT (OUTPATIENT)
Dept: FAMILY MEDICINE CLINIC | Age: 86
End: 2021-07-14
Payer: MEDICARE

## 2021-07-14 DIAGNOSIS — Z86.718 HX OF BLOOD CLOTS: ICD-10-CM

## 2021-07-14 LAB — INR BLD: 2.6

## 2021-07-14 PROCEDURE — 93793 ANTICOAG MGMT PT WARFARIN: CPT | Performed by: INTERNAL MEDICINE

## 2021-07-26 ENCOUNTER — ANTI-COAG VISIT (OUTPATIENT)
Dept: FAMILY MEDICINE CLINIC | Age: 86
End: 2021-07-26
Payer: MEDICARE

## 2021-07-26 LAB — INTERNATIONAL NORMALIZATION RATIO, POC: 2.8

## 2021-07-26 PROCEDURE — 85610 PROTHROMBIN TIME: CPT | Performed by: INTERNAL MEDICINE

## 2021-07-28 ENCOUNTER — OFFICE VISIT (OUTPATIENT)
Dept: FAMILY MEDICINE CLINIC | Age: 86
End: 2021-07-28
Payer: MEDICARE

## 2021-07-28 VITALS
SYSTOLIC BLOOD PRESSURE: 124 MMHG | OXYGEN SATURATION: 99 % | TEMPERATURE: 97.4 F | HEIGHT: 65 IN | BODY MASS INDEX: 23.99 KG/M2 | WEIGHT: 144 LBS | DIASTOLIC BLOOD PRESSURE: 78 MMHG | HEART RATE: 50 BPM

## 2021-07-28 DIAGNOSIS — D75.89 MACROCYTOSIS: ICD-10-CM

## 2021-07-28 DIAGNOSIS — Z79.01 ANTICOAGULANT LONG-TERM USE: ICD-10-CM

## 2021-07-28 DIAGNOSIS — J44.9 CHRONIC OBSTRUCTIVE PULMONARY DISEASE, UNSPECIFIED COPD TYPE (HCC): ICD-10-CM

## 2021-07-28 DIAGNOSIS — I10 ESSENTIAL HYPERTENSION: ICD-10-CM

## 2021-07-28 DIAGNOSIS — I50.32 CHRONIC HEART FAILURE WITH PRESERVED EJECTION FRACTION (HCC): ICD-10-CM

## 2021-07-28 DIAGNOSIS — G89.4 CHRONIC PAIN SYNDROME: ICD-10-CM

## 2021-07-28 DIAGNOSIS — F32.9 CURRENT EPISODE OF MAJOR DEPRESSIVE DISORDER WITHOUT PRIOR EPISODE, UNSPECIFIED DEPRESSION EPISODE SEVERITY: ICD-10-CM

## 2021-07-28 DIAGNOSIS — I25.10 CORONARY ARTERY DISEASE INVOLVING NATIVE CORONARY ARTERY OF NATIVE HEART WITHOUT ANGINA PECTORIS: ICD-10-CM

## 2021-07-28 DIAGNOSIS — Z91.81 AT HIGH RISK FOR FALLS: ICD-10-CM

## 2021-07-28 DIAGNOSIS — I48.0 PAF (PAROXYSMAL ATRIAL FIBRILLATION) (HCC): ICD-10-CM

## 2021-07-28 LAB
ALBUMIN SERPL-MCNC: 3.4 G/DL (ref 3.5–5.2)
ALP BLD-CCNC: 118 U/L (ref 35–104)
ALT SERPL-CCNC: 8 U/L (ref 0–32)
ANION GAP SERPL CALCULATED.3IONS-SCNC: 12 MMOL/L (ref 7–16)
AST SERPL-CCNC: 28 U/L (ref 0–31)
BASOPHILS ABSOLUTE: 0.04 E9/L (ref 0–0.2)
BASOPHILS RELATIVE PERCENT: 0.6 % (ref 0–2)
BILIRUB SERPL-MCNC: 1.1 MG/DL (ref 0–1.2)
BUN BLDV-MCNC: 24 MG/DL (ref 6–23)
CALCIUM SERPL-MCNC: 9.1 MG/DL (ref 8.6–10.2)
CHLORIDE BLD-SCNC: 99 MMOL/L (ref 98–107)
CHOLESTEROL, TOTAL: 108 MG/DL (ref 0–199)
CO2: 25 MMOL/L (ref 22–29)
CREAT SERPL-MCNC: 1.2 MG/DL (ref 0.5–1)
EOSINOPHILS ABSOLUTE: 0.03 E9/L (ref 0.05–0.5)
EOSINOPHILS RELATIVE PERCENT: 0.5 % (ref 0–6)
GFR AFRICAN AMERICAN: 51
GFR NON-AFRICAN AMERICAN: 42 ML/MIN/1.73
GLUCOSE BLD-MCNC: 81 MG/DL (ref 74–99)
HCT VFR BLD CALC: 49.2 % (ref 34–48)
HDLC SERPL-MCNC: 29 MG/DL
HEMOGLOBIN: 14.4 G/DL (ref 11.5–15.5)
IMMATURE GRANULOCYTES #: 0.02 E9/L
IMMATURE GRANULOCYTES %: 0.3 % (ref 0–5)
INTERNATIONAL NORMALIZATION RATIO, POC: 4.4
LDL CHOLESTEROL CALCULATED: 60 MG/DL (ref 0–99)
LYMPHOCYTES ABSOLUTE: 0.9 E9/L (ref 1.5–4)
LYMPHOCYTES RELATIVE PERCENT: 14.5 % (ref 20–42)
MAGNESIUM: 2.1 MG/DL (ref 1.6–2.6)
MCH RBC QN AUTO: 30.3 PG (ref 26–35)
MCHC RBC AUTO-ENTMCNC: 29.3 % (ref 32–34.5)
MCV RBC AUTO: 103.4 FL (ref 80–99.9)
MONOCYTES ABSOLUTE: 0.65 E9/L (ref 0.1–0.95)
MONOCYTES RELATIVE PERCENT: 10.5 % (ref 2–12)
NEUTROPHILS ABSOLUTE: 4.57 E9/L (ref 1.8–7.3)
NEUTROPHILS RELATIVE PERCENT: 73.6 % (ref 43–80)
PDW BLD-RTO: 17.3 FL (ref 11.5–15)
PLATELET # BLD: 227 E9/L (ref 130–450)
PMV BLD AUTO: 11.1 FL (ref 7–12)
POTASSIUM SERPL-SCNC: 4.6 MMOL/L (ref 3.5–5)
PROTHROMBIN TIME, POC: 52.8
RBC # BLD: 4.76 E12/L (ref 3.5–5.5)
SODIUM BLD-SCNC: 136 MMOL/L (ref 132–146)
TOTAL PROTEIN: 7.4 G/DL (ref 6.4–8.3)
TRIGL SERPL-MCNC: 93 MG/DL (ref 0–149)
VLDLC SERPL CALC-MCNC: 19 MG/DL
WBC # BLD: 6.2 E9/L (ref 4.5–11.5)

## 2021-07-28 PROCEDURE — G8420 CALC BMI NORM PARAMETERS: HCPCS | Performed by: INTERNAL MEDICINE

## 2021-07-28 PROCEDURE — 85610 PROTHROMBIN TIME: CPT | Performed by: INTERNAL MEDICINE

## 2021-07-28 PROCEDURE — 1123F ACP DISCUSS/DSCN MKR DOCD: CPT | Performed by: INTERNAL MEDICINE

## 2021-07-28 PROCEDURE — 1036F TOBACCO NON-USER: CPT | Performed by: INTERNAL MEDICINE

## 2021-07-28 PROCEDURE — G8926 SPIRO NO PERF OR DOC: HCPCS | Performed by: INTERNAL MEDICINE

## 2021-07-28 PROCEDURE — 99214 OFFICE O/P EST MOD 30 MIN: CPT | Performed by: INTERNAL MEDICINE

## 2021-07-28 PROCEDURE — G8427 DOCREV CUR MEDS BY ELIG CLIN: HCPCS | Performed by: INTERNAL MEDICINE

## 2021-07-28 PROCEDURE — 1090F PRES/ABSN URINE INCON ASSESS: CPT | Performed by: INTERNAL MEDICINE

## 2021-07-28 PROCEDURE — 4040F PNEUMOC VAC/ADMIN/RCVD: CPT | Performed by: INTERNAL MEDICINE

## 2021-07-28 PROCEDURE — 3023F SPIROM DOC REV: CPT | Performed by: INTERNAL MEDICINE

## 2021-07-28 RX ORDER — ATORVASTATIN CALCIUM 40 MG/1
40 TABLET, FILM COATED ORAL DAILY
Qty: 90 TABLET | Refills: 1 | Status: SHIPPED
Start: 2021-07-28 | End: 2021-12-22 | Stop reason: SDUPTHER

## 2021-07-28 RX ORDER — POTASSIUM CHLORIDE 1500 MG/1
20 TABLET, FILM COATED, EXTENDED RELEASE ORAL 2 TIMES DAILY
Qty: 180 TABLET | Refills: 1 | Status: SHIPPED
Start: 2021-07-28 | End: 2021-10-27 | Stop reason: SDUPTHER

## 2021-07-28 RX ORDER — LISINOPRIL 2.5 MG/1
2.5 TABLET ORAL DAILY
Qty: 90 TABLET | Refills: 1 | Status: SHIPPED
Start: 2021-07-28 | End: 2021-12-22 | Stop reason: SDUPTHER

## 2021-07-28 RX ORDER — POTASSIUM CHLORIDE 20 MEQ/1
20 TABLET, EXTENDED RELEASE ORAL DAILY
COMMUNITY
Start: 2021-07-03 | End: 2022-01-26 | Stop reason: ALTCHOICE

## 2021-07-28 RX ORDER — OXYCODONE AND ACETAMINOPHEN 10; 325 MG/1; MG/1
1 TABLET ORAL EVERY 6 HOURS PRN
COMMUNITY
End: 2021-07-28 | Stop reason: SDUPTHER

## 2021-07-28 RX ORDER — OXYCODONE AND ACETAMINOPHEN 10; 325 MG/1; MG/1
1 TABLET ORAL EVERY 6 HOURS PRN
Qty: 90 TABLET | Refills: 0 | Status: SHIPPED
Start: 2021-07-28 | End: 2021-08-25 | Stop reason: SDUPTHER

## 2021-07-28 RX ORDER — FLUOXETINE 10 MG/1
10 CAPSULE ORAL DAILY
Qty: 90 CAPSULE | Refills: 1 | Status: SHIPPED
Start: 2021-07-28 | End: 2021-12-22 | Stop reason: SDUPTHER

## 2021-07-28 RX ORDER — WARFARIN SODIUM 5 MG/1
TABLET ORAL
Qty: 90 TABLET | Refills: 1 | Status: SHIPPED | OUTPATIENT
Start: 2021-07-28

## 2021-07-28 RX ORDER — WARFARIN SODIUM 1 MG/1
1 TABLET ORAL
COMMUNITY

## 2021-07-28 RX ORDER — ISOSORBIDE MONONITRATE 30 MG/1
30 TABLET, EXTENDED RELEASE ORAL DAILY
Qty: 90 TABLET | Refills: 1 | Status: SHIPPED
Start: 2021-07-28 | End: 2021-12-22 | Stop reason: SDUPTHER

## 2021-07-28 RX ORDER — WARFARIN SODIUM 5 MG/1
TABLET ORAL
COMMUNITY
Start: 2021-07-23 | End: 2021-07-28 | Stop reason: SDUPTHER

## 2021-07-28 RX ORDER — FUROSEMIDE 20 MG/1
20 TABLET ORAL 2 TIMES DAILY
Qty: 180 TABLET | Refills: 1 | Status: SHIPPED | OUTPATIENT
Start: 2021-07-28

## 2021-07-28 NOTE — PROGRESS NOTES
[facility]     21  Barrington Barry : 3/1/1932 Sex: female  Age: 80 y.o. Chief Complaint   Patient presents with    Hypertension     2 month    Chronic Pain       HPI    Presents today for an office visit accompanied by her daughter. She is also here to have INR check/anticoagulation management which was high at 4.4. She has had no bleeding or thrombotic episodes. She is on the Coumadin for history of arterial embolism and DVT. Her lower extremity swelling has been good on her diuretic. She does continue to have some fatigue. Still only using her oxygen sporadically. She has had no falls. She continues to use her nebulizer for COPD. States her breathing has been good. Weight has been stable. Appetite better. Lipids have been good on statin medication. Depression has been stable on SSRI. requesting refill on her narcotic medication that she has been on for years.  This for chronic pain syndrome/back pain.  OARRS report was run and okay.  No evidence of abuse or diversion of the medication.     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 She no longer follows with renal.     Review of Systems     Constitutional:  negative for activity change, chills, diaphoresis,  and fever.  Positive for fatigue  HENT: Negative for congestion, ear pain, hearing loss, postnasal drip, rhinorrhea and sinus pain.    Respiratory: Positive for shortness of breath-improved post hospitalization for CHF.  Negative for cough and wheezing.         COPD chronic oxygen use   Cardiovascular: Negative for chest pain, palpitations.  Positive for leg swelling.  -Improved post diuresis  Gastrointestinal: Positive for constipation.   Negative for abdominal pain, blood in stool, diarrhea,  and vomiting.         Endocrine: Negative.    Genitourinary: Negative for difficulty urinating, dysuria, hematuria and urgency.         Reports frequent UTIs .does have some incontinence of urine following with urology. Eleanor Shaw hematuria has been worked up.   Musculoskeletal: Positive for arthralgias.  back pain, and gait problem       Chronic pain syndrome/back pain--lower back pain and bilateral hip pain/chronic-continues on narcotic pain medication with success. T11-L1 vertebroplasty. Successful.   Skin: Negative.    Neurological: Negative for dizziness, weakness, light-headedness, numbness and headaches. Hematological: Negative.    Psychiatric/Behavioral: Admits to being more emotional and angry since taken off of her Prozac.-Placed back on again with improvement  Does appear to be in  spirits good today.         REST OF PERTINENT ROS GONE OVER AND WAS NEGATIVE.      Past medical history:  Problem List: Chronic pain syndrome, Long-term current use of anticoagulant, Anticoagulant, Osteoarthritis, Taking  medication, Hyperlipidemia  Health Maintenance:  Mammogram - (1/23/2019)  Mammogram Screening - (1/23/2019)  Influenza Vaccination - (11/21/2018)  Bone Density Scan - (1/11/2011)  Colonoscopy - (11/12/2015)  Couseled on Home Safety - (4/12/2017)  Bone Density Test Screening - (5/23/2014)  Stress Test - 1/08-ok,3/11  EKG - 7/09,11,3/13  Rectal Exam - 8/10  Breast Exam - 8/10,declined,2/14  Hemmocult Cards - 2/13-neg x 3  2D ECHO - 8/15  Mini Mental Status - 4/19--29/30  Medical Problems:  Osteoarthritis  DVT - right-1/04  Hypertension  Lumbar Spondylosis-Herniated Disc-Spinal Stenosis - epidurals/[place]  Hyperlipidemia, YAIR-BSO--Non Cancerous  Small AAA - 3.4cm--- 2/19.--3.5 cm per CT urogram, CT abdomen-920  HX FX Left Ankle, Chronic Greater Trochanteric Bursitis, Depression, Cataracts  Coronary Artery Disease (CAD) - Previously declined cardiology fu  neurogenic claudication  Spinal Stenosis - L2-5 lumbar laminectomy-dr [facility]sey,[facility]  Kidney Stones, Anxiety, Diverticulitis, Osteoporosis, post laminectomy syndrome lumbar spine  Goiter - multinodular  hematuria - cystoscopy/retrograde-worked up by urology. urethral stenosis - dialated  chronic urethral inflammation, strep bovis bacteremia, Congestive Heart Failure (CHF), arterial thrombosis right arm  T12 compression fracture. - Vertebroplasty  Lumbar Spondylosis-Herniated Disc-Spinal Stenosis  T11 and L1 kyphoplasty -   Dilated nonischemic cardiomyopathy - Follows with cardiology  Copd  Hospitalization for congestive heart failure volume overload, ascites-  Cirrhosis per CAT scan  Aortic regurgitation-moderate  Pulmonary hypertension  Hospitalization--[facility]-3/21  ER visit for supratherapeutic INR greater than 10-     Reviewed and updated. FH:  Father:  MI -  late 63's. Mother:  Breast Cancer - ? ?  AGE 80. Son 1:  Chronic Obstructive Pulmonary Disease (COPD) -  age 61. Sister 1:  melanoma -  age in her 52's. Reviewed, no changes. SH:  Marital: . Personal Habits: Cigarette Use: Does Not Smoke. Alcohol: does not use alcohol.   Reviewed, no changes.                  Current Outpatient Medications:     potassium chloride (KLOR-CON M) 20 MEQ extended release tablet, TAKE 1 TABLET BY MOUTH TWICE DAILY, Disp: , Rfl:     warfarin (COUMADIN) 1 MG tablet, Take 1 mg by mouth, Disp: , Rfl:     potassium chloride (KLOR-CON M) 20 MEQ TBCR extended release tablet, Take 1 tablet by mouth 2 times daily, Disp: 180 tablet, Rfl: 1    lisinopril (ZESTRIL) 2.5 MG tablet, Take 1 tablet by mouth daily, Disp: 90 tablet, Rfl: 1    isosorbide mononitrate (IMDUR) 30 MG extended release tablet, Take 1 tablet by mouth daily Take morning of surgery with a sip of water, Disp: 90 tablet, Rfl: 1    furosemide (LASIX) 20 MG tablet, Take 1 tablet by mouth 2 times daily, Disp: 180 tablet, Rfl: 1    FLUoxetine (PROZAC) 10 MG capsule, Take 1 capsule by mouth daily, Disp: 90 capsule, Rfl: 1    atorvastatin (LIPITOR) 40 MG tablet, Take 1 tablet by mouth daily, Disp: 90 tablet, Rfl: 1    warfarin (COUMADIN) 5 MG tablet, TAKE 1 TABLET BY MOUTH ONCE DAILY, Disp: 90 tablet, Rfl: 1    oxyCODONE-acetaminophen (PERCOCET)  MG per tablet, Take 1 tablet by mouth every 6 hours as needed for Pain for up to 30 days. , Disp: 90 tablet, Rfl: 0    polyethylene glycol (GLYCOLAX) 17 g packet, Take 17 g by mouth daily as needed for Constipation, Disp: , Rfl:     rOPINIRole (REQUIP) 0.25 MG tablet, Take 1 pill a couple hours before bedtime. , Disp: 90 tablet, Rfl: 1    budesonide (PULMICORT) 0.5 MG/2ML nebulizer suspension, INHALE THE CONTENTS OF ONE VIAL (2ML) VIA NEBULIZER TWO TIMES A DAY, Disp: , Rfl:     ipratropium-albuterol (DUONEB) 0.5-2.5 (3) MG/3ML SOLN nebulizer solution, INHALE THE CONTENTS OF 1 VIAL (3ML) VIA NEBULIZER 3 TIMES A DAY AND AS NEEDED FOR DYSPNEA  COUGH OR WHEEZING, Disp: , Rfl:     Cyanocobalamin (VITAMIN B-12) 5000 MCG TBDP, Take 5,000 mcg by mouth daily , Disp: , Rfl:     Lactobacillus (PROBIOTIC ACIDOPHILUS PO), Take 1 capsule by mouth daily , Disp: , Rfl:     docusate sodium (COLACE) 100 MG capsule, Take 100 mg by mouth 2 times daily , Disp: , Rfl:   No Known Allergies    Past Medical History:   Diagnosis Date    AAA [facility]abdominal [facility] aneurysm) ([facility])     Anticoagulant long-term use 5/29/2019    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)     CAD (coronary artery disease)     CHF (congestive heart failure) ([facility])     Chronic pain syndrome 5/29/2019    HTN (hypertension)     Hx of blood clots 2015    right upper arm    Hyperlipemia      Past Surgical History:   Procedure Laterality Date    BACK SURGERY  2010    [facility] for sciatica, pt unsure    COLONOSCOPY      EYE SURGERY Bilateral     cataracts    FIXATION KYPHOPLASTY  10/02/2017    T12 kyphoplasty Dr Jorge L Ricardo KYPHOPLASTY  11/20/2017    T11-L1 kypho    HYSTERECTOMY      HYSTERECTOMY, TOTAL ABDOMINAL      TONSILLECTOMY      TRANSESOPHAGEAL ECHOCARDIOGRAM  11/04/15     Family History   Problem Relation Age of Onset    Breast Cancer Mother [de-identified]    Heart Disease Father     Cancer Sister         melanoma     Social History     Socioeconomic History    Marital status:      Spouse name: Not on file    Number of children: Not on file    Years of education: Not on file    Highest education level: Not on file   Occupational History    Not on file   Tobacco Use    Smoking status: Former Smoker     Packs/day: 0.25     Types: Cigarettes     Start date: 1952     Quit date: 1980     Years since quittin.2    Smokeless tobacco: Never Used   Vaping Use    Vaping Use: Never used   Substance and Sexual Activity    Alcohol use: No    Drug use: Never    Sexual activity: Never   Other Topics Concern    Not on file   Social History Narrative    Not on file     Social Determinants of Health     Financial Resource Strain: Low Risk     Difficulty of Paying Living Expenses: Not hard at all   Food Insecurity: No Food Insecurity    Worried About [address] [employer] in the Last Year: Never true    0 [facility] in the Last Year: Never true   Transportation Needs:     Lack of Transportation (Medical):      Lack of Transportation (Non-Medical):    Physical Activity:     Days of Exercise per Week:     Minutes of Exercise per Session:    Stress:     Feeling of Stress :    Social Connections:     Frequency of Communication with Friends and Family:     Frequency of Social Gatherings with Friends and Family:     Attends [employer] Services:     Active Member of Clubs or Organizations:     Attends Club or Organization Meetings:     Marital Status:    Intimate Partner Violence:     Fear of Current or Ex-Partner:     Emotionally Abused:     Physically Abused:     Sexually Abused:        Vitals:    21 1027   BP: 124/78   Pulse: 50   Temp: 97.4 °F (36.3 °C)   TempSrc: Temporal   SpO2: 99%   Weight: 144 lb (65.3 kg)   Height: 5' 5\" (1.651 m)       Physical Exam   Const: Appears well developed and well nourished. No signs of acute distress present. Neck: Supple and symmetric. Palpation reveals no adenopathy. No masses appreciated. Thyroid exhibits no nodules  or thyromegaly. No JVD. Carotids: 2+ and equal bilaterally, without bruits. Resp: no rales, rhonchi or wheezes appreciated over the lungs bilaterally.  Prolonged expiratory phase  CV: Rate is regular. Rhythm is regular. S1 is normal. S2 is normal. No gallop or rubs. No heart murmur  appreciated. Extremities: Trace  lower extremity pitting edema.  Left leg typically larger than right leg   abdomen: Bowel sounds are normoactive. Palpation of the abdomen reveals softness, but no distension,  organomegaly or tenderness. No abdominal masses. No palpable hepatosplenomegaly. Musculo: Patient arrived at office in wheelchair  Upper  Extremities: Full ROM bilaterally. Lower Extremities: Limited range of motion.  swelling noted. /.   Mace  does have some reproducible  tenderness along the lower thoracic upper lumbar region to palpation. No gross deformities noted. Pulses are  palpable. Psych: Patient's attitude is cooperative. Patient's affect is not depressed today.  Appears to be in good spirits. Neurologically grossly intact without focal deficits noted              Assessment and Plan:  Dorothy Henderson was seen today for hypertension and chronic pain. Diagnoses and all orders for this visit:    Essential hypertension  -     furosemide (LASIX) 20 MG tablet; Take 1 tablet by mouth 2 times daily  -     Comprehensive Metabolic Panel; Future  -     Magnesium; Future  Stable on current antihypertensive medication    Anticoagulant long-term use  -     POCT INR  -     CBC Auto Differential; Future  Supratherapeutic today and adjustments in Coumadin made    Chronic pain syndrome  -     oxyCODONE-acetaminophen (PERCOCET)  MG per tablet; Take 1 tablet by mouth every 6 hours as needed for Pain for up to 30 days.   Stable on her Norco    Chronic obstructive pulmonary disease, unspecified COPD type ([facility] [address]  Stable on nebulizer and oxygen    Chronic heart failure with preserved ejection fraction (HCC)  Stable with current medications including diuretics    Macrocytosis  Stable and has seen hematology/oncology in the past    At high risk for falls    PAF (paroxysmal atrial fibrillation) ([facility] [address]  Quiescent currently in sinus rhythm    Coronary artery disease involving native coronary artery of native heart without angina pectoris  -     Lipid Panel; Future  Stable does see cardiology    Current episode of major depressive disorder without prior episode, unspecified depression episode severity  Stable on SSRI     Other orders  -     potassium chloride (KLOR-CON M) 20 MEQ TBCR extended release tablet; Take 1 tablet by mouth 2 times daily  -     lisinopril (ZESTRIL) 2.5 MG tablet; Take 1 tablet by mouth daily  -     isosorbide mononitrate (IMDUR) 30 MG extended release tablet; Take 1 tablet by mouth daily Take morning of surgery with a sip of water  -     FLUoxetine (PROZAC) 10 MG capsule; Take 1 capsule by mouth daily  -     atorvastatin (LIPITOR) 40 MG tablet; Take 1 tablet by mouth daily  -     warfarin (COUMADIN) 5 MG tablet; TAKE 1 TABLET BY MOUTH ONCE DAILY    Plan: I will see her back virtually in 1 month for narcotic review/refill we will see back in 3 months for regular follow-up visit. I addressed her INR today. Hold Coumadin today then go to 2.5 mg daily. We will have the visiting nurses check an INR this coming Monday. We will call them. Monitor weight. Continue to monitor blood pressure through the visiting nurses. Blood work to monitor disease progression and medication use. Prescription management performed. Gave handout on food interaction and Coumadin. Notify us of problems in the interim. Return in about 3 months (around 10/28/2021).     Seen By:  Rick Zuniga MD      *Document was created using voice recognition software. Note was reviewed however may contain grammatical errors.

## 2021-07-29 ENCOUNTER — TELEPHONE (OUTPATIENT)
Dept: FAMILY MEDICINE CLINIC | Age: 86
End: 2021-07-29

## 2021-07-29 NOTE — TELEPHONE ENCOUNTER
Letter sent to patient letting her know that her labs came back and are stable. Attached a copy of the results for her records.

## 2021-08-23 ENCOUNTER — ANTI-COAG VISIT (OUTPATIENT)
Dept: FAMILY MEDICINE CLINIC | Age: 86
End: 2021-08-23

## 2021-08-23 LAB — INR BLD: 2.3

## 2021-08-25 ENCOUNTER — VIRTUAL VISIT (OUTPATIENT)
Dept: FAMILY MEDICINE CLINIC | Age: 86
End: 2021-08-25
Payer: MEDICARE

## 2021-08-25 DIAGNOSIS — Z79.01 ANTICOAGULANT LONG-TERM USE: ICD-10-CM

## 2021-08-25 DIAGNOSIS — J44.9 CHRONIC OBSTRUCTIVE PULMONARY DISEASE, UNSPECIFIED COPD TYPE (HCC): ICD-10-CM

## 2021-08-25 DIAGNOSIS — G89.4 CHRONIC PAIN SYNDROME: ICD-10-CM

## 2021-08-25 DIAGNOSIS — I48.0 PAF (PAROXYSMAL ATRIAL FIBRILLATION) (HCC): ICD-10-CM

## 2021-08-25 PROCEDURE — G8926 SPIRO NO PERF OR DOC: HCPCS | Performed by: INTERNAL MEDICINE

## 2021-08-25 PROCEDURE — G8420 CALC BMI NORM PARAMETERS: HCPCS | Performed by: INTERNAL MEDICINE

## 2021-08-25 PROCEDURE — 3023F SPIROM DOC REV: CPT | Performed by: INTERNAL MEDICINE

## 2021-08-25 PROCEDURE — 99213 OFFICE O/P EST LOW 20 MIN: CPT | Performed by: INTERNAL MEDICINE

## 2021-08-25 PROCEDURE — 1090F PRES/ABSN URINE INCON ASSESS: CPT | Performed by: INTERNAL MEDICINE

## 2021-08-25 PROCEDURE — 1123F ACP DISCUSS/DSCN MKR DOCD: CPT | Performed by: INTERNAL MEDICINE

## 2021-08-25 PROCEDURE — 4040F PNEUMOC VAC/ADMIN/RCVD: CPT | Performed by: INTERNAL MEDICINE

## 2021-08-25 PROCEDURE — 1036F TOBACCO NON-USER: CPT | Performed by: INTERNAL MEDICINE

## 2021-08-25 PROCEDURE — G8427 DOCREV CUR MEDS BY ELIG CLIN: HCPCS | Performed by: INTERNAL MEDICINE

## 2021-08-25 RX ORDER — OXYCODONE AND ACETAMINOPHEN 10; 325 MG/1; MG/1
1 TABLET ORAL EVERY 8 HOURS PRN
Qty: 90 TABLET | Refills: 0 | Status: SHIPPED | OUTPATIENT
Start: 2021-08-25 | End: 2021-10-27

## 2021-08-25 RX ORDER — OXYCODONE AND ACETAMINOPHEN 10; 325 MG/1; MG/1
1 TABLET ORAL EVERY 6 HOURS PRN
Qty: 90 TABLET | Refills: 0 | Status: CANCELLED | OUTPATIENT
Start: 2021-08-25 | End: 2021-09-24

## 2021-08-25 NOTE — PROGRESS NOTES
TeleMedicine Video Visit    Sharon Rodarte, was evaluated through a synchronous (real-time) audio-video encounter. The patient (or guardian if applicable) is aware that this is a billable service. Verbal consent to proceed has been obtained within the past 12 months. The visit was conducted pursuant to the emergency declaration under the [facility], [address] [place] [address] and the [employer] Act. Patient identification was verified, and a caregiver was present when appropriate. The patient was located in a state where the provider was credentialed to provide care. Patient identification was verified at the start of the visit, including the patient's telephone number and physical location. I discussed with the patient the nature of our telehealth visits, that:     1. Due to the nature of an audio- video modality, the only components of a physical exam that could be done are the elements supported by direct observation. 2. I would evaluate the patient and recommend diagnostics and treatments based on my assessment. 3. If it was felt that the patient should be evaluated in clinic or an emergency room setting, then they would be directed there. 4. Our sessions are not being recorded and that personal health information is protected. 5. Our team would provide follow up care in person if/when the patient needs it. Patient's location: home address in [facility]  Physician  location Physician office in [place] other people involved in call--daughter       ,     This visit was completed virtually using [employer]. [facility]anna [facility]     21  Sharon Rodarte : 3/1/1932 Sex: female  Age: 80 y.o. Chief Complaint   Patient presents with    Hypertension    Chronic Pain       HPI  Patient presents today via virtual video visit with ongoing COVID-19 pandemic.   Patient is pretty much homebound and very difficult to get out of the house. This is narcotic renewal visit. Is requesting her Norco.  Has been on this for quite some time (years). This for chronic pain syndrome/back pain.  OARRS report was run and okay.  No evidence of abuse or diversion of the medication. Nephrology not been checking blood pressure at home. Visiting nurse has been coming through and apparently checking it but I have not received documentation of numbers. I would like to speak with the visiting nurse they have been checking her INR is at home and calling them to me. I have been addressing them over the phone. Most recently 2 days ago she was at 2.3 and we kept her at her same dose of 2.5 mg daily. I told him to stay on that and we will have this rechecked again in 2 weeks. Weight unfortunately is down 9 pounds. Patient and daughter states she is eating but not as much. She did have a bout of shingles recently that was rather extensive. She was treated with antiviral medication and is improving slowly. Daughter states they did cancel urology appointment recently secondary to the shingles. They are going to call them regarding recent urinalysis that was ordered through them. 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   She no longer follows with renal    Review of Systems     Constitutional:  negative for activity change, chills, diaphoresis,  and fever.  Positive for fatigue  HENT: Negative for congestion, ear pain, hearing loss, postnasal drip, rhinorrhea and sinus pain.    Respiratory: Positive for shortness of breath-improved post hospitalization for CHF.  Negative for cough and wheezing.         COPD chronic oxygen use   Cardiovascular: Negative for chest pain, palpitations.  Positive for leg swelling.  -Improved post diuresis if in-has had some bilateral foot swelling recently but was mild  Gastrointestinal: Positive for constipation.   Negative for abdominal pain, blood in stool, diarrhea,  and vomiting.         Endocrine: Negative.    Genitourinary: Negative for difficulty urinating, dysuria, hematuria and urgency.         Reports frequent UTIs .does have some incontinence of urine following with urology. Nilsa Sow hematuria has been worked up.   Musculoskeletal: Positive for arthralgias.  back pain, and gait problem       Chronic pain syndrome/back pain--lower back pain and bilateral hip pain/chronic-continues on narcotic pain medication with success. T11-L1 vertebroplasty. Successful.   Skin: Negative.    Neurological: Negative for dizziness, weakness, light-headedness, numbness and headaches. Hematological: Negative.    Psychiatric/Behavioral: Admits to being more emotional and angry since taken off of her Prozac.-Placed back on again with improvement  Does appear to be in  spirits good today.         REST OF PERTINENT ROS GONE OVER AND WAS NEGATIVE. Current Outpatient Medications:     oxyCODONE-acetaminophen (PERCOCET)  MG per tablet, Take 1 tablet by mouth every 8 hours as needed for Pain for up to 30 days. , Disp: 90 tablet, Rfl: 0    potassium chloride (KLOR-CON M) 20 MEQ extended release tablet, TAKE 1 TABLET BY MOUTH TWICE DAILY, Disp: , Rfl:     warfarin (COUMADIN) 1 MG tablet, Take 1 mg by mouth, Disp: , Rfl:     potassium chloride (KLOR-CON M) 20 MEQ TBCR extended release tablet, Take 1 tablet by mouth 2 times daily, Disp: 180 tablet, Rfl: 1    lisinopril (ZESTRIL) 2.5 MG tablet, Take 1 tablet by mouth daily, Disp: 90 tablet, Rfl: 1    isosorbide mononitrate (IMDUR) 30 MG extended release tablet, Take 1 tablet by mouth daily Take morning of surgery with a sip of water, Disp: 90 tablet, Rfl: 1    furosemide (LASIX) 20 MG tablet, Take 1 tablet by mouth 2 times daily, Disp: 180 tablet, Rfl: 1    FLUoxetine (PROZAC) 10 MG capsule, Take 1 capsule by mouth daily, Disp: 90 capsule, Rfl: 1    atorvastatin (LIPITOR) 40 MG tablet, Take 1 tablet by mouth daily, Disp: 90 tablet, Rfl: 1    warfarin (COUMADIN) 5 MG tablet, TAKE 1 TABLET BY MOUTH ONCE DAILY, Disp: 90 tablet, Rfl: 1    polyethylene glycol (GLYCOLAX) 17 g packet, Take 17 g by mouth daily as needed for Constipation, Disp: , Rfl:     rOPINIRole (REQUIP) 0.25 MG tablet, Take 1 pill a couple hours before bedtime. , Disp: 90 tablet, Rfl: 1    budesonide (PULMICORT) 0.5 MG/2ML nebulizer suspension, INHALE THE CONTENTS OF ONE VIAL (2ML) VIA NEBULIZER TWO TIMES A DAY, Disp: , Rfl:     ipratropium-albuterol (DUONEB) 0.5-2.5 (3) MG/3ML SOLN nebulizer solution, INHALE THE CONTENTS OF 1 VIAL (3ML) VIA NEBULIZER 3 TIMES A DAY AND AS NEEDED FOR DYSPNEA  COUGH OR WHEEZING, Disp: , Rfl:     Cyanocobalamin (VITAMIN B-12) 5000 MCG TBDP, Take 5,000 mcg by mouth daily , Disp: , Rfl:     Lactobacillus (PROBIOTIC ACIDOPHILUS PO), Take 1 capsule by mouth daily , Disp: , Rfl:     docusate sodium (COLACE) 100 MG capsule, Take 100 mg by mouth 2 times daily , Disp: , Rfl:   No Known Allergies    Past Medical History:   Diagnosis Date    AAA (abdominal aortic aneurysm) (HCC)     Anticoagulant long-term use 5/29/2019    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)     CAD (coronary artery disease)     CHF (congestive heart failure) (HCC)     Chronic pain syndrome 5/29/2019    HTN (hypertension)     Hx of blood clots 2015    right upper arm    Hyperlipemia      Past Surgical History:   Procedure Laterality Date    BACK SURGERY  2010    [facility] for sciatica, pt unsure    COLONOSCOPY      EYE SURGERY Bilateral     cataracts    FIXATION KYPHOPLASTY  10/02/2017    T12 kyphoplasty Dr Gretta Sandoval    FIXATION KYPHOPLASTY  11/20/2017    T11-L1 kypho    HYSTERECTOMY      HYSTERECTOMY, TOTAL ABDOMINAL      TONSILLECTOMY      TRANSESOPHAGEAL ECHOCARDIOGRAM  11/04/15     Family History   Problem Relation Age of Onset    Breast Cancer Mother [de-identified]    Heart Disease Father     Cancer Sister         melanoma     Social History     Socioeconomic History    Marital status:      Spouse name: Not on file    Number of children: Not on file    Years of education: Not on file    Highest education level: Not on file   Occupational History    Not on file   Tobacco Use    Smoking status: Former Smoker     Packs/day: 0.25     Types: Cigarettes     Start date: 1952     Quit date: 1980     Years since quittin.3    Smokeless tobacco: Never Used   Vaping Use    Vaping Use: Never used   Substance and Sexual Activity    Alcohol use: No    Drug use: Never    Sexual activity: Never   Other Topics Concern    Not on file   Social History Narrative    Not on file     Social Determinants of Health     Financial Resource Strain: Low Risk     Difficulty of Paying Living Expenses: Not hard at all   Food Insecurity: No Food Insecurity    Worried About [address] in the Last Year: Never true    [facility] in the Last Year: Never true   Transportation Needs:     Lack of Transportation (Medical):  Lack of Transportation (Non-Medical):    Physical Activity:     Days of Exercise per Week:     Minutes of Exercise per Session:    Stress:     Feeling of Stress :    Social Connections:     Frequency of Communication with Friends and Family:     Frequency of Social Gatherings with Friends and Family:     Attends Hoahaoism Services:     Active Member of Clubs or Organizations:     Attends Club or Organization Meetings:     Marital Status:    Intimate Partner Violence:     Fear of Current or Ex-Partner:     Emotionally Abused:     Physically Abused:     Sexually Abused: There were no vitals filed for this visit. Physical Exam  Constitutional:       General: She is not in acute distress. Pulmonary:      Effort: Pulmonary effort is normal.   Musculoskeletal:         General: Swelling present. Cervical back: Normal range of motion.       Comments: She did have some mild bilateral foot swelling   Skin:     Comments: Area of recent shingles noted. Rather extensive and scabbing over. Left abdomen and side. Neurological:      Mental Status: She is alert. Psychiatric:         Mood and Affect: Mood normal.         Behavior: Behavior normal.         Thought Content: Thought content normal.         Judgment: Judgment normal.               Assessment and Plan:  Joaquim Mckeon was seen today for hypertension and chronic pain. Diagnoses and all orders for this visit:    Chronic pain syndrome  -     oxyCODONE-acetaminophen (PERCOCET)  MG per tablet; Take 1 tablet by mouth every 8 hours as needed for Pain for up to 30 days. Anticoagulant long-term use    Chronic obstructive pulmonary disease, unspecified COPD type (HCC)    PA[facility]oxysmal [facility]) [facility]    Plan: We will see her back in a month for virtual visit/narcotic refill.  2 months for regular visit. See above body report. Prescription management performed. Fall precautions. Recent labs reviewed. Monitor weight for loss. Notify us of problems in the interim. Return in about 1 month (around 9/25/2021) for vvv. Seen By:  Renetta Dennison MD      *Document was created using voice recognition software. Note was reviewed however may contain grammatical errors.

## 2021-09-14 ENCOUNTER — TELEPHONE (OUTPATIENT)
Dept: FAMILY MEDICINE CLINIC | Age: 86
End: 2021-09-14

## 2021-09-14 RX ORDER — GABAPENTIN 100 MG/1
CAPSULE ORAL
Qty: 90 CAPSULE | Refills: 1 | Status: SHIPPED
Start: 2021-09-14 | End: 2021-11-24 | Stop reason: ALTCHOICE

## 2021-09-14 NOTE — TELEPHONE ENCOUNTER
Daughter, Tonio Riser called to say that pt is asking for something for pain for the shingles. She has had the shingles for about 2 1/2 - 3 weeks. [facility] the [facility].

## 2021-09-20 ENCOUNTER — ANTI-COAG VISIT (OUTPATIENT)
Dept: FAMILY MEDICINE CLINIC | Age: 86
End: 2021-09-20

## 2021-09-20 LAB — INR BLD: 1.8

## 2021-09-20 NOTE — PROGRESS NOTES
Dr Jesica Cervantes received phone call from [address] [place] [address] with patient's INR of 1.8. Doc is having patient take an additional 2.5mg today then 2.5mg Monday thru Saturday and 5 mg on Sunday. Repeat in 2 weeks.

## 2021-10-04 ENCOUNTER — ANTI-COAG VISIT (OUTPATIENT)
Dept: FAMILY MEDICINE CLINIC | Age: 86
End: 2021-10-04

## 2021-10-04 LAB — INR BLD: 2

## 2021-10-04 NOTE — PROGRESS NOTES
Nimisha Lawrence called Dr Ida Faustin with patients INR of 2.0. Dr Zaire Martin advised Jean Herring to have patient continue the same dose and recheck in 2 weeks. Currently on 2.5mg Monday-Saturday, 5mg on Sunday.

## 2021-10-18 ENCOUNTER — TELEPHONE (OUTPATIENT)
Dept: FAMILY MEDICINE CLINIC | Age: 86
End: 2021-10-18

## 2021-10-18 ENCOUNTER — ANTI-COAG VISIT (OUTPATIENT)
Dept: FAMILY MEDICINE CLINIC | Age: 86
End: 2021-10-18

## 2021-10-18 LAB — INR BLD: 1.4

## 2021-10-18 NOTE — TELEPHONE ENCOUNTER
Nathan Schultz from [facility] home care called with the the patient's INR number. It is 1.4. He wants to know if her medication needs to be changed for this at all?

## 2021-10-18 NOTE — TELEPHONE ENCOUNTER
Spoke with Funmilayo Hogan of [facility] [place] regarding patient's INR today which was 1.4. Had them have patient take 5 mg today and 2.5 mg Monday through Friday 5 mg Saturday and Sunday repeat INR in 1 week. Her current dose is 2.5 mg daily.

## 2021-10-18 NOTE — PROGRESS NOTES
Cony Knight from [facility] called with patients INR of 1.4. Dr Sukhdeep Harvey advised him to have patient take 5mg tonight then go to 2.5mg Monday through Friday and 5mg on Saturday and Sunday. Repeat INR in 1 week.

## 2021-10-25 LAB — INR BLD: 1.5

## 2021-10-27 ENCOUNTER — ANTI-COAG VISIT (OUTPATIENT)
Dept: FAMILY MEDICINE CLINIC | Age: 86
End: 2021-10-27

## 2021-10-27 ENCOUNTER — OFFICE VISIT (OUTPATIENT)
Dept: FAMILY MEDICINE CLINIC | Age: 86
End: 2021-10-27
Payer: MEDICARE

## 2021-10-27 VITALS
OXYGEN SATURATION: 94 % | SYSTOLIC BLOOD PRESSURE: 120 MMHG | HEART RATE: 63 BPM | TEMPERATURE: 97.8 F | DIASTOLIC BLOOD PRESSURE: 80 MMHG | BODY MASS INDEX: 22.5 KG/M2 | WEIGHT: 135.2 LBS

## 2021-10-27 DIAGNOSIS — I10 ESSENTIAL HYPERTENSION: ICD-10-CM

## 2021-10-27 DIAGNOSIS — I50.32 CHRONIC HEART FAILURE WITH PRESERVED EJECTION FRACTION (HCC): ICD-10-CM

## 2021-10-27 DIAGNOSIS — Z86.718 HX OF BLOOD CLOTS: ICD-10-CM

## 2021-10-27 DIAGNOSIS — F32.9 CURRENT EPISODE OF MAJOR DEPRESSIVE DISORDER WITHOUT PRIOR EPISODE, UNSPECIFIED DEPRESSION EPISODE SEVERITY: ICD-10-CM

## 2021-10-27 DIAGNOSIS — D75.89 MACROCYTOSIS: ICD-10-CM

## 2021-10-27 DIAGNOSIS — I48.0 PAF (PAROXYSMAL ATRIAL FIBRILLATION) (HCC): ICD-10-CM

## 2021-10-27 DIAGNOSIS — J44.9 CHRONIC OBSTRUCTIVE PULMONARY DISEASE, UNSPECIFIED COPD TYPE (HCC): ICD-10-CM

## 2021-10-27 DIAGNOSIS — Z91.81 AT HIGH RISK FOR FALLS: ICD-10-CM

## 2021-10-27 DIAGNOSIS — I25.10 CORONARY ARTERY DISEASE INVOLVING NATIVE CORONARY ARTERY OF NATIVE HEART WITHOUT ANGINA PECTORIS: ICD-10-CM

## 2021-10-27 DIAGNOSIS — Z79.01 ANTICOAGULANT LONG-TERM USE: ICD-10-CM

## 2021-10-27 DIAGNOSIS — G89.4 CHRONIC PAIN SYNDROME: ICD-10-CM

## 2021-10-27 LAB
ALBUMIN SERPL-MCNC: 3.4 G/DL (ref 3.5–5.2)
ALP BLD-CCNC: 152 U/L (ref 35–104)
ALT SERPL-CCNC: 16 U/L (ref 0–32)
ANION GAP SERPL CALCULATED.3IONS-SCNC: 12 MMOL/L (ref 7–16)
AST SERPL-CCNC: 30 U/L (ref 0–31)
BASOPHILS ABSOLUTE: 0.03 E9/L (ref 0–0.2)
BASOPHILS RELATIVE PERCENT: 0.5 % (ref 0–2)
BILIRUB SERPL-MCNC: 1.2 MG/DL (ref 0–1.2)
BUN BLDV-MCNC: 29 MG/DL (ref 6–23)
CALCIUM SERPL-MCNC: 9.4 MG/DL (ref 8.6–10.2)
CHLORIDE BLD-SCNC: 103 MMOL/L (ref 98–107)
CO2: 23 MMOL/L (ref 22–29)
CREAT SERPL-MCNC: 1.3 MG/DL (ref 0.5–1)
EOSINOPHILS ABSOLUTE: 0.06 E9/L (ref 0.05–0.5)
EOSINOPHILS RELATIVE PERCENT: 1 % (ref 0–6)
GFR AFRICAN AMERICAN: 47
GFR NON-AFRICAN AMERICAN: 39 ML/MIN/1.73
GLUCOSE BLD-MCNC: 88 MG/DL (ref 74–99)
HCT VFR BLD CALC: 47.6 % (ref 34–48)
HEMOGLOBIN: 14.8 G/DL (ref 11.5–15.5)
IMMATURE GRANULOCYTES #: 0.03 E9/L
IMMATURE GRANULOCYTES %: 0.5 % (ref 0–5)
LYMPHOCYTES ABSOLUTE: 1.12 E9/L (ref 1.5–4)
LYMPHOCYTES RELATIVE PERCENT: 18.8 % (ref 20–42)
MCH RBC QN AUTO: 30.8 PG (ref 26–35)
MCHC RBC AUTO-ENTMCNC: 31.1 % (ref 32–34.5)
MCV RBC AUTO: 99 FL (ref 80–99.9)
MONOCYTES ABSOLUTE: 0.68 E9/L (ref 0.1–0.95)
MONOCYTES RELATIVE PERCENT: 11.4 % (ref 2–12)
NEUTROPHILS ABSOLUTE: 4.04 E9/L (ref 1.8–7.3)
NEUTROPHILS RELATIVE PERCENT: 67.8 % (ref 43–80)
PDW BLD-RTO: 18.5 FL (ref 11.5–15)
PLATELET # BLD: 162 E9/L (ref 130–450)
PMV BLD AUTO: 12.3 FL (ref 7–12)
POTASSIUM SERPL-SCNC: 4.5 MMOL/L (ref 3.5–5)
RBC # BLD: 4.81 E12/L (ref 3.5–5.5)
SODIUM BLD-SCNC: 138 MMOL/L (ref 132–146)
TOTAL PROTEIN: 8.1 G/DL (ref 6.4–8.3)
WBC # BLD: 6 E9/L (ref 4.5–11.5)

## 2021-10-27 PROCEDURE — G8484 FLU IMMUNIZE NO ADMIN: HCPCS | Performed by: INTERNAL MEDICINE

## 2021-10-27 PROCEDURE — 3023F SPIROM DOC REV: CPT | Performed by: INTERNAL MEDICINE

## 2021-10-27 PROCEDURE — 1123F ACP DISCUSS/DSCN MKR DOCD: CPT | Performed by: INTERNAL MEDICINE

## 2021-10-27 PROCEDURE — 4040F PNEUMOC VAC/ADMIN/RCVD: CPT | Performed by: INTERNAL MEDICINE

## 2021-10-27 PROCEDURE — 1036F TOBACCO NON-USER: CPT | Performed by: INTERNAL MEDICINE

## 2021-10-27 PROCEDURE — G8420 CALC BMI NORM PARAMETERS: HCPCS | Performed by: INTERNAL MEDICINE

## 2021-10-27 PROCEDURE — 99215 OFFICE O/P EST HI 40 MIN: CPT | Performed by: INTERNAL MEDICINE

## 2021-10-27 PROCEDURE — 1090F PRES/ABSN URINE INCON ASSESS: CPT | Performed by: INTERNAL MEDICINE

## 2021-10-27 PROCEDURE — G8926 SPIRO NO PERF OR DOC: HCPCS | Performed by: INTERNAL MEDICINE

## 2021-10-27 PROCEDURE — G8428 CUR MEDS NOT DOCUMENT: HCPCS | Performed by: INTERNAL MEDICINE

## 2021-10-27 RX ORDER — LEVOTHYROXINE SODIUM 0.03 MG/1
25 TABLET ORAL DAILY
Qty: 90 TABLET | Refills: 1 | Status: SHIPPED
Start: 2021-10-27 | End: 2021-12-22

## 2021-10-27 RX ORDER — POTASSIUM CHLORIDE 1500 MG/1
20 TABLET, FILM COATED, EXTENDED RELEASE ORAL 2 TIMES DAILY
Qty: 180 TABLET | Refills: 1 | Status: SHIPPED
Start: 2021-10-27 | End: 2021-11-24 | Stop reason: ALTCHOICE

## 2021-10-27 RX ORDER — LEVOTHYROXINE SODIUM 0.03 MG/1
25 TABLET ORAL DAILY
COMMUNITY
End: 2021-10-27

## 2021-10-27 NOTE — PROGRESS NOTES
[facility]     10/27/21  Rhonda Frankel : 3/1/1932 Sex: female  Age: 80 y.o. Chief Complaint   Patient presents with    Chronic Pain     refills        HPI    Patient presents today for follow-up visit with recent hospitalization. She is accompanied by her daughter. Was hospitalized after a fall out of bed injuring her toes/feet. States she had a hard time walking was feeling weak. She was taken to the hospital admitted was found to be in a junctional rhythm she was seen by cardiology who did not feel any further work-up needed to be done. Patient improved. She had CTA chest, CAT scan of the head cervical spine and x-rays of foot and ankle. Reviewed all of this along with her labs and consultations including cardiology orthopedics who did inject her knee as well as neurology. They plan on doing further neurological evaluation but patient decided against MRI and other testing. Again she did recover and is feeling well currently. Her INR today was again subtherapeutic at 1.5. I had daughter give her 10 mg today and then go to 2.5 mg Monday through Thursday and 5 mg Saturday through . Told him we will check this again in 2 weeks with home health care nurse. She does continue to take the Coumadin with history of arterial embolism and DVT. Lower extremity edema has gone down on the Lasix. She occasionally uses her nebulizer for COPD has not been very compliant with it. Weight is down 5 pounds from last visit. States her appetite is okay. Lipids have been good on statin medication and depression has been stable on her SSRI. She is requesting a refill on her narcotic medication which she has been on for some time now for chronic back pain. She states it does give her some relief and would not be able to function without it. [facility] report was run and okay. No evidence of abuse or diversion of medication.   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 She no longer follows with renal.     Review of Systems     Constitutional:  negative for activity change, chills, diaphoresis,  and fever.  Positive for fatigue  HENT: Negative for congestion, ear pain, hearing loss, postnasal drip, rhinorrhea and sinus pain.    Respiratory: Positive for shortness of breath-improved post hospitalization for CHF.  Negative for cough and wheezing.         COPD chronic oxygen use   Cardiovascular: Negative for chest pain, palpitations.  Positive for leg swelling.  -Improved post diuresis  Gastrointestinal: Positive for constipation.   Negative for abdominal pain, blood in stool, diarrhea,  and vomiting.         Endocrine: Negative.    Genitourinary: Negative for difficulty urinating, dysuria, hematuria and urgency.         Reports frequent UTIs .does have some incontinence of urine following with urology. Gwynda Hind hematuria has been worked up.   Musculoskeletal: Positive for arthralgias.  back pain, and gait problem       Chronic pain syndrome/back pain--lower back pain and bilateral hip pain/chronic-continues on narcotic pain medication with success. T11-L1 vertebroplasty. Successful.   Skin: Negative.    Neurological: Negative for dizziness, weakness, light-headedness, numbness and headaches. Hematological: Negative.    Psychiatric/Behavioral: Admits to being more emotional and angry since taken off of her Prozac.-Placed back on again with improvement            REST OF PERTINENT ROS GONE OVER AND WAS NEGATIVE.      Past medical history:  Problem List: Chronic pain syndrome, Long-term current use of anticoagulant, Anticoagulant, Osteoarthritis, Taking  medication, Hyperlipidemia  Health Maintenance:  Mammogram - (1/23/2019)  Mammogram Screening - (1/23/2019)  Influenza Vaccination - (11/21/2018)  Bone Density Scan - (1/11/2011)  Colonoscopy - (11/12/2015)  Couseled on Home Safety - (4/12/2017)  Bone Density Test Screening - (2014)  Stress Test - -ok,3/11  EKG - ,11,3/13  Rectal Exam - 8/10  Breast Exam - 8/10,declined,  Hemmocult Cards - -neg x 3  2D ECHO - 8/15  Mini Mental Status - --  Medical Problems:  Osteoarthritis  DVT - right-  Hypertension  Lumbar Spondylosis-Herniated Disc-Spinal Stenosis - epidurals/[place]  Hyperlipidemia, YAIR-BSO--Non Cancerous  Small AAA - 3.4cm--- .--3.5 cm per CT urogram, CT abdomen-920  HX FX Left Ankle, Chronic Greater Trochanteric Bursitis, Depression, Cataracts  Coronary Artery Disease (CAD) - Previously declined cardiology fu  neurogenic claudication  Spinal Stenosis - L2-5 lumbar laminectomy-dr massey,[facility]  Kidney Stones, Anxiety, Diverticulitis, Osteoporosis, post laminectomy syndrome lumbar spine  Goiter - multinodular  hematuria - cystoscopy/retrograde-worked up by urology. urethral stenosis - dialated  chronic urethral inflammation, strep bovis bacteremia, Congestive Heart Failure (CHF), arterial thrombosis right arm  T12 compression fracture. - Vertebroplasty  Lumbar Spondylosis-Herniated Disc-Spinal Stenosis  T11 and L1 kyphoplasty -   Dilated nonischemic cardiomyopathy - Follows with cardiology  Copd  Hospitalization for congestive heart failure volume overload, ascites-  Cirrhosis per CAT scan  Aortic regurgitation-moderate  Pulmonary hypertension  Hospitalization--[facility]pEF-3/21  ER visit for supratherapeutic INR greater than 10-  Hospitalization for fall, weakness, junctional heart rhythm-  Reviewed and updated. FH:  Father:  MI -  late 63's. Mother:  Breast Cancer - ? ?  AGE 80. Son 1:  Chronic Obstructive Pulmonary Disease (COPD) -  age 61. Sister 1:  melanoma -  age in her 52's. Reviewed, no changes. SH:  Marital: . Personal Habits: Cigarette Use: Does Not Smoke. Alcohol: does not use alcohol.   Reviewed, no changes.                   Current Outpatient Medications:     potassium chloride (KLOR-CON M) 20 MEQ TBCR extended release tablet, Take 1 tablet by mouth 2 times daily, Disp: 180 tablet, Rfl: 1    levothyroxine (SYNTHROID) 25 MCG tablet, Take 1 tablet by mouth daily, Disp: 90 tablet, Rfl: 1    gabapentin (NEURONTIN) 100 MG capsule, Take 1 p.o. tonight, 2 p.o. tomorrow night, and 3 p.o. nightly thereafter, Disp: 90 capsule, Rfl: 1    oxyCODONE-acetaminophen (PERCOCET)  MG per tablet, Take 1 tablet by mouth every 8 hours as needed for Pain for up to 30 days. , Disp: 90 tablet, Rfl: 0    potassium chloride (KLOR-CON M) 20 MEQ extended release tablet, TAKE 1 TABLET BY MOUTH TWICE DAILY, Disp: , Rfl:     warfarin (COUMADIN) 1 MG tablet, Take 1 mg by mouth, Disp: , Rfl:     lisinopril (ZESTRIL) 2.5 MG tablet, Take 1 tablet by mouth daily, Disp: 90 tablet, Rfl: 1    isosorbide mononitrate (IMDUR) 30 MG extended release tablet, Take 1 tablet by mouth daily Take morning of surgery with a sip of water, Disp: 90 tablet, Rfl: 1    furosemide (LASIX) 20 MG tablet, Take 1 tablet by mouth 2 times daily, Disp: 180 tablet, Rfl: 1    FLUoxetine (PROZAC) 10 MG capsule, Take 1 capsule by mouth daily, Disp: 90 capsule, Rfl: 1    atorvastatin (LIPITOR) 40 MG tablet, Take 1 tablet by mouth daily, Disp: 90 tablet, Rfl: 1    warfarin (COUMADIN) 5 MG tablet, TAKE 1 TABLET BY MOUTH ONCE DAILY, Disp: 90 tablet, Rfl: 1    polyethylene glycol (GLYCOLAX) 17 g packet, Take 17 g by mouth daily as needed for Constipation, Disp: , Rfl:     rOPINIRole (REQUIP) 0.25 MG tablet, Take 1 pill a couple hours before bedtime.  (Patient not taking: Reported on 10/27/2021), Disp: 90 tablet, Rfl: 1    budesonide (PULMICORT) 0.5 MG/2ML nebulizer suspension, INHALE THE CONTENTS OF ONE VIAL (2ML) VIA NEBULIZER TWO TIMES A DAY, Disp: , Rfl:     ipratropium-albuterol (DUONEB) 0.5-2.5 (3) MG/3ML SOLN nebulizer solution, INHALE THE CONTENTS OF 1 VIAL (3ML) VIA NEBULIZER 3 TIMES A DAY AND AS NEEDED FOR DYSPNEA  COUGH OR WHEEZING, Disp: , Rfl:     Cyanocobalamin (VITAMIN B-12) 5000 MCG TBDP, Take 5,000 mcg by mouth daily , Disp: , Rfl:     Lactobacillus (PROBIOTIC ACIDOPHILUS PO), Take 1 capsule by mouth daily , Disp: , Rfl:     docusate sodium (COLACE) 100 MG capsule, Take 100 mg by mouth 2 times daily , Disp: , Rfl:   No Known Allergies    Past Medical History:   Diagnosis Date    [facility]     Anticoagulant long-term use 2019    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)     CAD (coronary artery disease)     CHF [facility]congestive heart failure) ([facility] [address]     Chronic pain syndrome 2019    HTN (hypertension)     Hx of blood clots     right upper arm    Hyperlipemia      Past Surgical History:   Procedure Laterality Date    BACK SURGERY      [facility] for sciatica, pt unsure    COLONOSCOPY      EYE SURGERY Bilateral     cataracts    FIXATION KYPHOPLASTY  10/02/2017    T12 kyphoplasty Dr Vivek Leos KYPHOPLASTY  2017    T11-L1 kypho    HYSTERECTOMY      HYSTERECTOMY, TOTAL ABDOMINAL      TONSILLECTOMY      TRANSESOPHAGEAL ECHOCARDIOGRAM  11/04/15     Family History   Problem Relation Age of Onset    Breast Cancer Mother [de-identified]    Heart Disease Father     Cancer Sister         melanoma     Social History     Socioeconomic History    Marital status:      Spouse name: Not on file    Number of children: Not on file    Years of education: Not on file    Highest education level: Not on file   Occupational History    Not on file   Tobacco Use    Smoking status: Former Smoker     Packs/day: 0.25     Types: Cigarettes     Start date: 1952     Quit date: 1980     Years since quittin.4    Smokeless tobacco: Never Used   Vaping Use    Vaping Use: Never used   Substance and Sexual Activity    Alcohol use: No    Drug use: Never    Sexual activity: Never   Other Topics Concern    Not on file   Social History Narrative    Not on file     Social Determinants of Health     Financial Resource Strain: Low Risk     Difficulty of Paying Living Expenses: Not hard at all   Food Insecurity: No Food Insecurity    Worried About Running Out of Food in the Last Year: Never true    Jayden of Food in the Last Year: Never true   Transportation Needs:     Lack of Transportation (Medical):  Lack of Transportation (Non-Medical):    Physical Activity:     Days of Exercise per Week:     Minutes of Exercise per Session:    Stress:     Feeling of Stress :    Social Connections:     Frequency of Communication with Friends and Family:     Frequency of Social Gatherings with Friends and Family:     Attends [employer] Services:     Active Member of Clubs or Organizations:     Attends Club or Organization Meetings:     Marital Status:    Intimate Partner Violence:     Fear of Current or Ex-Partner:     Emotionally Abused:     Physically Abused:     Sexually Abused:        Vitals:    10/27/21 1602   BP: 120/80   Pulse: 63   Temp: 97.8 °F (36.6 °C)   SpO2: 94%   Weight: 135 lb 3.2 oz (61.3 kg)       Physical Exam    Const: Appears well developed and well nourished. No signs of acute distress present. Neck: Supple and symmetric. Palpation reveals no adenopathy. No masses appreciated. Thyroid exhibits no nodules  or thyromegaly. No JVD. Carotids: 2+ and equal bilaterally, without bruits. Resp: no rales, rhonchi or wheezes appreciated over the lungs bilaterally.  Prolonged expiratory phase  CV: Rate is regular. Rhythm is regular. S1 is normal. S2 is normal. No gallop or rubs. No heart murmur  appreciated. Extremities: Trace  lower extremity pitting edema.  Left leg typically larger than right leg   abdomen: Bowel sounds are normoactive. Palpation of the abdomen reveals softness, but no distension,  organomegaly or tenderness. No abdominal masses. No palpable hepatosplenomegaly.   Musculo: Patient arrived at office in wheelchair  Upper  Extremities: Full ROM bilaterally. Lower Extremities: Limited range of motion.   She does have some reproducible  tenderness along the lower thoracic upper lumbar region to palpation. No gross deformities noted. Pulses are  palpable. Psych: Patient's attitude is cooperative. Patient's affect is not depressed today.  Appears to be in good spirits. Neurologically grossly intact without focal deficits noted           Assessment and Plan:  Ines Alvarado was seen today for chronic pain. Diagnoses and all orders for this visit:    Chronic pain syndrome  Stable on current narcotic medication    Anticoagulant long-term use    Chronic obstructive pulmonary disease, unspecified COPD type ([facility] [address]  Stable on inhaler/nebulizer-sees pulmonary    Essential hypertension  -     CBC Auto Differential; Future  -     Comprehensive Metabolic Panel; Future  Stable on antihypertensive medication    Chronic heart failure with preserved ejection fraction (HCC)  Stable-sees cardiology    Macrocytosis  Stable and has seen hematology/oncology    At high risk for falls    Coronary artery disease involving native coronary artery of native heart without angina pectoris    Current episode of major depressive disorder without prior episode, unspecified depression episode severity  Stable on SSRI    Hx of blood clots    PAF (paroxysmal atrial fibrillation) ([facility] [address]  Stable and currently quiescent. Currently regular rhythm  Other orders  -     potassium chloride (KLOR-CON M) 20 MEQ TBCR extended release tablet; Take 1 tablet by mouth 2 times daily  -     levothyroxine (SYNTHROID) 25 MCG tablet; Take 1 tablet by mouth daily    Plan: Made changes to her Coumadin today-see above. Follow with above consultants. Blood work today to monitor disease progression and medication use. See back in 1 month as needed. We will check thyroid numbers at that time as she has been newly placed on Synthroid during her last hospitalization. Fall precautions.   Notify us of problems in the interim. Return in about 1 month (around 11/27/2021) for 2 week inr, 1 month fu. Seen By:  North Shepard MD      *Document was created using voice recognition software. Note was reviewed however may contain grammatical errors.

## 2021-10-28 ENCOUNTER — TELEPHONE (OUTPATIENT)
Dept: FAMILY MEDICINE CLINIC | Age: 86
End: 2021-10-28

## 2021-10-28 NOTE — TELEPHONE ENCOUNTER
Kidney function has decreased. I think she is on the dry side. Decrease her Lasix from 40 mg to 20 mg daily and also decrease her potassium from 40 mEq to 20 mEq. Please put in note field to recheck BMP at next visit.

## 2021-10-29 ENCOUNTER — TELEPHONE (OUTPATIENT)
Dept: FAMILY MEDICINE CLINIC | Age: 86
End: 2021-10-29

## 2021-10-29 DIAGNOSIS — G89.4 CHRONIC PAIN SYNDROME: Primary | ICD-10-CM

## 2021-10-29 RX ORDER — OXYCODONE AND ACETAMINOPHEN 10; 325 MG/1; MG/1
1 TABLET ORAL EVERY 8 HOURS PRN
Qty: 90 TABLET | Refills: 0 | Status: SHIPPED
Start: 2021-10-29 | End: 2021-11-24 | Stop reason: SDUPTHER

## 2021-10-29 RX ORDER — OXYCODONE AND ACETAMINOPHEN 10; 325 MG/1; MG/1
1 TABLET ORAL EVERY 8 HOURS PRN
COMMUNITY
End: 2021-10-29 | Stop reason: SDUPTHER

## 2021-11-10 ENCOUNTER — ANTI-COAG VISIT (OUTPATIENT)
Dept: FAMILY MEDICINE CLINIC | Age: 86
End: 2021-11-10

## 2021-11-10 LAB — INR BLD: 3.2

## 2021-11-22 LAB — INR BLD: 2.4

## 2021-11-22 NOTE — PROGRESS NOTES
Kale from [facility] called with patient's INR result. She has an INR of 2.4. Dr Meena James advised her to just keep the same dosage instructions of 2.5 mon - Friday, 5mg sat & sun.  Repeat INR in 2 weeks

## 2021-11-24 ENCOUNTER — OFFICE VISIT (OUTPATIENT)
Dept: FAMILY MEDICINE CLINIC | Age: 86
End: 2021-11-24
Payer: MEDICARE

## 2021-11-24 VITALS
HEART RATE: 72 BPM | BODY MASS INDEX: 24.79 KG/M2 | HEIGHT: 65 IN | SYSTOLIC BLOOD PRESSURE: 126 MMHG | DIASTOLIC BLOOD PRESSURE: 62 MMHG | TEMPERATURE: 97.4 F | WEIGHT: 148.8 LBS | OXYGEN SATURATION: 91 %

## 2021-11-24 DIAGNOSIS — E03.9 HYPOTHYROIDISM, UNSPECIFIED TYPE: ICD-10-CM

## 2021-11-24 DIAGNOSIS — I10 ESSENTIAL HYPERTENSION: ICD-10-CM

## 2021-11-24 DIAGNOSIS — Z79.01 ANTICOAGULANT LONG-TERM USE: ICD-10-CM

## 2021-11-24 DIAGNOSIS — I48.0 PAF (PAROXYSMAL ATRIAL FIBRILLATION) (HCC): ICD-10-CM

## 2021-11-24 DIAGNOSIS — I50.32 CHRONIC HEART FAILURE WITH PRESERVED EJECTION FRACTION (HCC): ICD-10-CM

## 2021-11-24 DIAGNOSIS — D75.89 MACROCYTOSIS: ICD-10-CM

## 2021-11-24 DIAGNOSIS — J44.9 CHRONIC OBSTRUCTIVE PULMONARY DISEASE, UNSPECIFIED COPD TYPE (HCC): ICD-10-CM

## 2021-11-24 DIAGNOSIS — R53.83 FATIGUE, UNSPECIFIED TYPE: ICD-10-CM

## 2021-11-24 DIAGNOSIS — G89.4 CHRONIC PAIN SYNDROME: ICD-10-CM

## 2021-11-24 DIAGNOSIS — Z91.81 AT HIGH RISK FOR FALLS: ICD-10-CM

## 2021-11-24 DIAGNOSIS — I25.10 CORONARY ARTERY DISEASE INVOLVING NATIVE CORONARY ARTERY OF NATIVE HEART WITHOUT ANGINA PECTORIS: ICD-10-CM

## 2021-11-24 LAB
ANION GAP SERPL CALCULATED.3IONS-SCNC: 15 MMOL/L (ref 7–16)
BUN BLDV-MCNC: 19 MG/DL (ref 6–23)
CALCIUM SERPL-MCNC: 9 MG/DL (ref 8.6–10.2)
CHLORIDE BLD-SCNC: 106 MMOL/L (ref 98–107)
CO2: 21 MMOL/L (ref 22–29)
CREAT SERPL-MCNC: 1.1 MG/DL (ref 0.5–1)
GFR AFRICAN AMERICAN: 56
GFR NON-AFRICAN AMERICAN: 47 ML/MIN/1.73
GLUCOSE BLD-MCNC: 78 MG/DL (ref 74–99)
INTERNATIONAL NORMALIZATION RATIO, POC: 2.8
POTASSIUM SERPL-SCNC: 3.9 MMOL/L (ref 3.5–5)
PROTHROMBIN TIME, POC: 33.3
SODIUM BLD-SCNC: 142 MMOL/L (ref 132–146)
T4 FREE: 1.17 NG/DL (ref 0.93–1.7)
TSH SERPL DL<=0.05 MIU/L-ACNC: 6.32 UIU/ML (ref 0.27–4.2)

## 2021-11-24 PROCEDURE — 1090F PRES/ABSN URINE INCON ASSESS: CPT | Performed by: INTERNAL MEDICINE

## 2021-11-24 PROCEDURE — 1123F ACP DISCUSS/DSCN MKR DOCD: CPT | Performed by: INTERNAL MEDICINE

## 2021-11-24 PROCEDURE — 4040F PNEUMOC VAC/ADMIN/RCVD: CPT | Performed by: INTERNAL MEDICINE

## 2021-11-24 PROCEDURE — G8420 CALC BMI NORM PARAMETERS: HCPCS | Performed by: INTERNAL MEDICINE

## 2021-11-24 PROCEDURE — 99214 OFFICE O/P EST MOD 30 MIN: CPT | Performed by: INTERNAL MEDICINE

## 2021-11-24 PROCEDURE — G8484 FLU IMMUNIZE NO ADMIN: HCPCS | Performed by: INTERNAL MEDICINE

## 2021-11-24 PROCEDURE — 1036F TOBACCO NON-USER: CPT | Performed by: INTERNAL MEDICINE

## 2021-11-24 PROCEDURE — 90694 VACC AIIV4 NO PRSRV 0.5ML IM: CPT | Performed by: INTERNAL MEDICINE

## 2021-11-24 PROCEDURE — 3023F SPIROM DOC REV: CPT | Performed by: INTERNAL MEDICINE

## 2021-11-24 PROCEDURE — 85610 PROTHROMBIN TIME: CPT | Performed by: INTERNAL MEDICINE

## 2021-11-24 PROCEDURE — G8926 SPIRO NO PERF OR DOC: HCPCS | Performed by: INTERNAL MEDICINE

## 2021-11-24 PROCEDURE — G8427 DOCREV CUR MEDS BY ELIG CLIN: HCPCS | Performed by: INTERNAL MEDICINE

## 2021-11-24 PROCEDURE — G0008 ADMIN INFLUENZA VIRUS VAC: HCPCS | Performed by: INTERNAL MEDICINE

## 2021-11-24 RX ORDER — OXYCODONE AND ACETAMINOPHEN 10; 325 MG/1; MG/1
1 TABLET ORAL EVERY 8 HOURS PRN
Qty: 90 TABLET | Refills: 0 | Status: SHIPPED
Start: 2021-11-24 | End: 2021-12-22 | Stop reason: SDUPTHER

## 2021-11-25 ENCOUNTER — TELEPHONE (OUTPATIENT)
Dept: FAMILY MEDICINE CLINIC | Age: 86
End: 2021-11-25

## 2021-11-25 DIAGNOSIS — E03.9 HYPOTHYROIDISM, UNSPECIFIED TYPE: Primary | ICD-10-CM

## 2021-11-25 NOTE — TELEPHONE ENCOUNTER
Kidney function has improved on lower dose Lasix. Continue same for now. I want to increase her levothyroxine from 25 to 50 mcg and recheck labs in about 6 weeks.

## 2021-11-26 ENCOUNTER — TELEPHONE (OUTPATIENT)
Dept: FAMILY MEDICINE CLINIC | Age: 86
End: 2021-11-26

## 2021-11-26 DIAGNOSIS — D75.89 MACROCYTOSIS: Primary | ICD-10-CM

## 2021-11-26 NOTE — TELEPHONE ENCOUNTER
Please see if the home health nurse can drop vitamin B70 and folic acid sometime in the next week or 2.   Order is in

## 2021-11-26 NOTE — PROGRESS NOTES
[address] [place]     21  Adrian Mixon : 3/1/1932 Sex: female  Age: 80 y.o. Chief Complaint   Patient presents with    Office Visit for Anticoagulation Management     1 month    Other     Chronic pain syndrome    COPD    Fatigue       HPI    Patient presents today for 1 month follow-up visit and INR check/anticoagulation management/narcotic refill. Reviewed last note which is rather extensive hospital follow-up visit. According to the daughter patient still complains of her fatigue being sleepy all the time. Of note she is not using her oxygen nor nebulizer as directed. I had a long discussion with patient regarding this and the need to do so and the fact that perhaps the hypoxia is causing some of her symptomatology. Daughter states she does desaturate down into the [de-identified]. She does have known COPD. During her last hospitalization they did wish to do further neurological evaluation with MRI and neuro consult however patient did decline. She is not having any other neurological complaints or deficits. Her INR today was 2.8. She is therapeutic. No bleeding episodes. She continues on Coumadin with history of arterial embolism and DVT. We did decrease her Lasix and potassium in the interim because of decreased renal function and will recheck numbers again today. Swelling has still been kept to a minimum on the lower dose of Lasix. Lipids have been stable on statin medication. Depression has been stable on her SSRI. She is requesting her narcotic medication which she has been on for some time now for chronic back pain. She states it does give her some relief would not be able to function without it. [place] report was run and okay. No evidence of abuse or diversion of the medication.   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  She no longer follows with renal.       Review of Systems Constitutional:  negative for activity change, chills, diaphoresis,  and fever.  Positive for fatigue  HENT: Negative for congestion, ear pain, hearing loss, postnasal drip, rhinorrhea and sinus pain.    Respiratory: Positive for shortness of breath-improved post hospitalization for CHF.  Negative for cough and wheezing.         COPD chronic oxygen use   Cardiovascular: Negative for chest pain, palpitations.  Positive for leg swelling.  -Improved post diuresis  Gastrointestinal: Positive for constipation.   Negative for abdominal pain, blood in stool, diarrhea,  and vomiting.         Endocrine: Negative.    Genitourinary: Negative for difficulty urinating, dysuria, hematuria and urgency.         Reports frequent UTIs .does have some incontinence of urine following with urology. Leyla Montano hematuria has been worked up.   Musculoskeletal: Positive for arthralgias.  back pain, and gait problem       Chronic pain syndrome/back pain--lower back pain and bilateral hip pain/chronic-continues on narcotic pain medication with success. T11-L1 vertebroplasty. Successful.   Skin: Negative.    Neurological: Negative for dizziness, weakness, light-headedness, numbness and headaches. Hematological: Negative.    Psychiatric/Behavioral: Admits to being more emotional and angry since taken off of her Prozac.-Placed back on again with improvement           REST OF PERTINENT ROS GONE OVER AND WAS NEGATIVE.      Past medical history:  Problem List: Chronic pain syndrome, Long-term current use of anticoagulant, Anticoagulant, Osteoarthritis, Taking  medication, Hyperlipidemia  Health Maintenance:  Mammogram - (1/23/2019)  Mammogram Screening - (1/23/2019)  Influenza Vaccination - (11/21/2018)  Bone Density Scan - (1/11/2011)  Colonoscopy - (11/12/2015)  Couseled on Home Safety - (4/12/2017)  Bone Density Test Screening - (5/23/2014)  Stress Test - 1/08-ok,3/11  EKG - 7/09,11,3/13  Rectal Exam - 8/10  Breast Exam - 8/10,declined,  Hemmocult Cards - -neg x 3  2D ECHO - 8/15  Mini Mental Status - --  Medical Problems:  Osteoarthritis  DVT - right-  Hypertension  Lumbar Spondylosis-Herniated Disc-Spinal Stenosis - epidurals/[place]  Hyperlipidemia, YAIR-BSO--Non Cancerous  Small AAA - 3.4cm--- .--3.5 cm per CT urogram, CT abdomen-920  HX FX Left Ankle, Chronic Greater Trochanteric Bursitis, Depression, Cataracts  Coronary Artery Disease (CAD) - Previously declined cardiology fu  neurogenic claudication  Spinal Stenosis - L2-5 lumbar laminectomy-dr massey,[facility]  Kidney Stones, Anxiety, Diverticulitis, Osteoporosis, post laminectomy syndrome lumbar spine  Goiter - multinodular  hematuria - cystoscopy/retrograde-worked up by urology. urethral stenosis - dialated  chronic urethral inflammation, strep bovis bacteremia, Congestive Heart Failure (CHF), arterial thrombosis right arm  T12 compression fracture. - Vertebroplasty  Lumbar Spondylosis-Herniated Disc-Spinal Stenosis  T11 and L1 kyphoplasty -   Dilated nonischemic cardiomyopathy - Follows with cardiology  Copd  Hospitalization for congestive heart failure volume overload, ascites-  Cirrhosis per CAT scan  Aortic regurgitation-moderate  Pulmonary hypertension  Hospitalization--HFpEF-3/21  ER visit for supratherapeutic INR greater than 10-  Hospitalization for fall, weakness, junctional heart rhythm-  Hypothyroidism-10/21  Macrocytosis-evaluated by heme/onc-  Reviewed and updated. FH:  Father:  MI -  late 63's. Mother:  Breast Cancer - ? ?  AGE 80. Son 1:  Chronic Obstructive Pulmonary Disease (COPD) -  age 61. Sister 1:  melanoma -  age in her 52's. Reviewed, no changes. SH:  Marital: . Personal Habits: Cigarette Use: Does Not Smoke. Alcohol: does not use alcohol.   Reviewed, no changes.                Current Outpatient Medications:     oxyCODONE-acetaminophen (PERCOCET)  MG per tablet, Take 1 tablet by mouth every 8 hours as needed for Pain for up to 30 days. , Disp: 90 tablet, Rfl: 0    levothyroxine (SYNTHROID) 25 MCG tablet, Take 1 tablet by mouth daily, Disp: 90 tablet, Rfl: 1    potassium chloride (KLOR-CON M) 20 MEQ extended release tablet, Take 20 mEq by mouth daily , Disp: , Rfl:     warfarin (COUMADIN) 1 MG tablet, Take 1 mg by mouth, Disp: , Rfl:     lisinopril (ZESTRIL) 2.5 MG tablet, Take 1 tablet by mouth daily, Disp: 90 tablet, Rfl: 1    isosorbide mononitrate (IMDUR) 30 MG extended release tablet, Take 1 tablet by mouth daily Take morning of surgery with a sip of water, Disp: 90 tablet, Rfl: 1    furosemide (LASIX) 20 MG tablet, Take 1 tablet by mouth 2 times daily (Patient taking differently: Take 20 mg by mouth daily ), Disp: 180 tablet, Rfl: 1    FLUoxetine (PROZAC) 10 MG capsule, Take 1 capsule by mouth daily, Disp: 90 capsule, Rfl: 1    atorvastatin (LIPITOR) 40 MG tablet, Take 1 tablet by mouth daily, Disp: 90 tablet, Rfl: 1    warfarin (COUMADIN) 5 MG tablet, TAKE 1 TABLET BY MOUTH ONCE DAILY, Disp: 90 tablet, Rfl: 1    polyethylene glycol (GLYCOLAX) 17 g packet, Take 17 g by mouth daily as needed for Constipation, Disp: , Rfl:     Cyanocobalamin (VITAMIN B-12) 5000 MCG TBDP, Take 5,000 mcg by mouth daily , Disp: , Rfl:     Lactobacillus (PROBIOTIC ACIDOPHILUS PO), Take 1 capsule by mouth daily , Disp: , Rfl:     docusate sodium (COLACE) 100 MG capsule, Take 100 mg by mouth 2 times daily , Disp: , Rfl:   No Known Allergies    Past Medical History:   Diagnosis Date    AAA (abdominal aortic aneurysm) (HCC)     Anticoagulant long-term use 5/29/2019    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)     CAD (coronary artery disease)     CHF (congestive heart failure) (HCC)     Chronic pain syndrome 5/29/2019    HTN (hypertension)     Hx of blood clots 2015    right upper arm    Hyperlipemia      Past Surgical History:   Procedure Laterality Date    BACK SURGERY      [facility], pt unsure    COLONOSCOPY      EYE SURGERY Bilateral     cataracts    FIXATION KYPHOPLASTY  10/02/2017    T12 kyphoplasty Dr Emili Panda KYPHOPLASTY  2017    T11-L1 kypho    HYSTERECTOMY      HYSTERECTOMY, TOTAL ABDOMINAL      TONSILLECTOMY      TRANSESOPHAGEAL ECHOCARDIOGRAM  11/04/15     Family History   Problem Relation Age of Onset    Breast Cancer Mother [de-identified]    Heart Disease Father     Cancer Sister         melanoma     Social History     Socioeconomic History    Marital status:      Spouse name: Not on file    Number of children: Not on file    Years of education: Not on file    Highest education level: Not on file   Occupational History    Not on file   Tobacco Use    Smoking status: Former Smoker     Packs/day: 0.25     Types: Cigarettes     Start date: 1952     Quit date: 1980     Years since quittin.5    Smokeless tobacco: Never Used   Vaping Use    Vaping Use: Never used   Substance and Sexual Activity    Alcohol use: No    Drug use: Never    Sexual activity: Never   Other Topics Concern    Not on file   Social History Narrative    Not on file     Social Determinants of Health     Financial Resource Strain: Low Risk     Difficulty of Paying Living Expenses: Not hard at all   Food Insecurity: No Food Insecurity    Worried About Running Out of Food in the Last Year: Never true    [address] in the Last Year: Never true   Transportation Needs:     Lack of Transportation (Medical): Not on file    Lack of Transportation (Non-Medical):  Not on file   Physical Activity:     Days of Exercise per Week: Not on file    Minutes of Exercise per Session: Not on file   Stress:     Feeling of Stress : Not on file   Social Connections:     Frequency of Communication with Friends and Family: Not on file    Frequency of Social Gatherings with Friends and Family: Not on file    Attends [employer] Services: Not on is not depressed today.  Appears to be in good spirits. Neurologically grossly intact without focal deficits noted                Assessment and Plan:  Savana Castelan was seen today for office visit for anticoagulation management, other, copd and fatigue. Diagnoses and all orders for this visit:    Chronic pain syndrome  -     oxyCODONE-acetaminophen (PERCOCET)  MG per tablet; Take 1 tablet by mouth every 8 hours as needed for Pain for up to 30 days. Stable    Anticoagulant long-term use  -     POCT INR  Stable without bleeding    Chronic obstructive pulmonary disease, unspecified COPD type ([address]  Stressed compliance with oxygen and nebulizer    Fatigue, unspecified type    Essential hypertension  -     Basic Metabolic Panel; Future  Stable on antihypertensive medication    Chronic heart failure with preserved ejection fraction (HCC)  Stable    At high risk for falls    Macrocytosis    Coronary artery disease involving native coronary artery of native heart without angina pectoris  Stable    PAF (paroxysmal atrial fibrillation) (HCC)  Currently sinus rhythm and anticoagulated    Hypothyroidism, unspecified type  -     TSH without Reflex; Future  -     T4, Free; Future  Recently started on levothyroxine    Other orders  -     INFLUENZA, QUADV, ADJUVANTED, 65 YRS =, IM, PF, PREFILL SYR, 0.5ML (FLUAD)    Plan: Same dose Coumadin. Prescription management performed. Blood work including thyroid numbers to monitor disease progression and medication use. Same dose Lasix and potassium. Flu vaccine given. See above body report. Start using nebulizer and oxygen as directed. 1 month virtual video visit in 2-month in office regular follow-up visit. Will have home health nurse check her next INR in next couple weeks. All precautions. Notify us of problems in the interim. Check vitamin E32 and folic acid with next blood draw.     Return in about 1 month (around 12/24/2021) for 1 month vvv -narcotic2 month reg visit. Seen By:  Naya Eden MD      *Document was created using voice recognition software. Note was reviewed however may contain grammatical errors.

## 2021-12-01 NOTE — TELEPHONE ENCOUNTER
Called patient's daughter and notified her that her moms blood work showed that her Kidney function has improved on lower dose Lasix. Continue same for now. I want to increase her levothyroxine from 25 to 50 mcg and recheck labs in about 6 weeks. Daughter verbalized understanding and will make sure the medication gets changed. Bloodwork orders faxed to [facility].

## 2021-12-06 ENCOUNTER — ANTI-COAG VISIT (OUTPATIENT)
Dept: FAMILY MEDICINE CLINIC | Age: 86
End: 2021-12-06

## 2021-12-06 LAB — INR BLD: 3.4

## 2021-12-06 NOTE — PROGRESS NOTES
[facility] nurse, Sonja Dick, called in to Dr Omega Yip on his cell with patient's INR of 3.4. Patient is currently on 2.5 mg Mon - Fri and 5mg Sat and Sun. Dr Omega Yip is having her hold todays dose, then changing her to 2.5mg mon thru sat and 5mg on sun. Repeat INR in 1 week.

## 2021-12-16 LAB — INR BLD: 2.1

## 2021-12-17 ENCOUNTER — ANTI-COAG VISIT (OUTPATIENT)
Dept: FAMILY MEDICINE CLINIC | Age: 86
End: 2021-12-17

## 2021-12-17 NOTE — PROGRESS NOTES
[facility] called with patients INR of 2.1. She missed last 3 days o doses. Her original dosing instructions are 2.5mg Mon-Sat and 5mg on Sun. Dr Jean-Pierre Damon advised the nurse to have her take 2.5mg now until Monday when it will be checked again.

## 2021-12-20 ENCOUNTER — ANTI-COAG VISIT (OUTPATIENT)
Dept: FAMILY MEDICINE CLINIC | Age: 86
End: 2021-12-20

## 2021-12-20 LAB — INR BLD: 1.9

## 2021-12-20 NOTE — PROGRESS NOTES
[facility] nurse called in on Dr Borrego's cell with patients INR result of 1.9. Dr Dwaine Martinez advised nurse to have her keep the same dosage regimen of 2.5 mg daily and recheck it in a week.

## 2021-12-22 ENCOUNTER — VIRTUAL VISIT (OUTPATIENT)
Dept: FAMILY MEDICINE CLINIC | Age: 86
End: 2021-12-22
Payer: MEDICARE

## 2021-12-22 DIAGNOSIS — I48.0 PAF (PAROXYSMAL ATRIAL FIBRILLATION) (HCC): ICD-10-CM

## 2021-12-22 DIAGNOSIS — R60.9 EDEMA, UNSPECIFIED TYPE: ICD-10-CM

## 2021-12-22 DIAGNOSIS — G89.4 CHRONIC PAIN SYNDROME: ICD-10-CM

## 2021-12-22 DIAGNOSIS — Z79.01 ANTICOAGULANT LONG-TERM USE: ICD-10-CM

## 2021-12-22 PROCEDURE — 1036F TOBACCO NON-USER: CPT | Performed by: INTERNAL MEDICINE

## 2021-12-22 PROCEDURE — 1090F PRES/ABSN URINE INCON ASSESS: CPT | Performed by: INTERNAL MEDICINE

## 2021-12-22 PROCEDURE — G8420 CALC BMI NORM PARAMETERS: HCPCS | Performed by: INTERNAL MEDICINE

## 2021-12-22 PROCEDURE — 1123F ACP DISCUSS/DSCN MKR DOCD: CPT | Performed by: INTERNAL MEDICINE

## 2021-12-22 PROCEDURE — G8428 CUR MEDS NOT DOCUMENT: HCPCS | Performed by: INTERNAL MEDICINE

## 2021-12-22 PROCEDURE — 4040F PNEUMOC VAC/ADMIN/RCVD: CPT | Performed by: INTERNAL MEDICINE

## 2021-12-22 PROCEDURE — G8484 FLU IMMUNIZE NO ADMIN: HCPCS | Performed by: INTERNAL MEDICINE

## 2021-12-22 PROCEDURE — 99213 OFFICE O/P EST LOW 20 MIN: CPT | Performed by: INTERNAL MEDICINE

## 2021-12-22 RX ORDER — ISOSORBIDE MONONITRATE 30 MG/1
30 TABLET, EXTENDED RELEASE ORAL DAILY
Qty: 90 TABLET | Refills: 1 | Status: SHIPPED | OUTPATIENT
Start: 2021-12-22

## 2021-12-22 RX ORDER — FLUOXETINE 10 MG/1
10 CAPSULE ORAL DAILY
Qty: 90 CAPSULE | Refills: 1 | Status: SHIPPED | OUTPATIENT
Start: 2021-12-22

## 2021-12-22 RX ORDER — ATORVASTATIN CALCIUM 40 MG/1
40 TABLET, FILM COATED ORAL DAILY
Qty: 90 TABLET | Refills: 1 | Status: SHIPPED | OUTPATIENT
Start: 2021-12-22

## 2021-12-22 RX ORDER — LEVOTHYROXINE SODIUM 0.05 MG/1
50 TABLET ORAL DAILY
Qty: 90 TABLET | Refills: 1 | Status: SHIPPED | OUTPATIENT
Start: 2021-12-22

## 2021-12-22 RX ORDER — LISINOPRIL 2.5 MG/1
2.5 TABLET ORAL DAILY
Qty: 90 TABLET | Refills: 1 | Status: SHIPPED | OUTPATIENT
Start: 2021-12-22

## 2021-12-22 RX ORDER — WARFARIN SODIUM 5 MG/1
TABLET ORAL
Qty: 90 TABLET | Refills: 1 | Status: CANCELLED | OUTPATIENT
Start: 2021-12-22

## 2021-12-22 RX ORDER — OXYCODONE AND ACETAMINOPHEN 10; 325 MG/1; MG/1
1 TABLET ORAL EVERY 8 HOURS PRN
Qty: 90 TABLET | Refills: 0 | Status: SHIPPED | OUTPATIENT
Start: 2021-12-22 | End: 2022-01-21

## 2021-12-23 ENCOUNTER — TELEPHONE (OUTPATIENT)
Dept: FAMILY MEDICINE CLINIC | Age: 86
End: 2021-12-23

## 2021-12-23 NOTE — TELEPHONE ENCOUNTER
Arleta Mcardle calling to find out if you wanted to increase her potassium since the Lasix was increased?

## 2021-12-23 NOTE — PROGRESS NOTES
TeleMedicine Video Visit    Iliana Zamudio, was evaluated through a synchronous (real-time) audio-video encounter. The patient (or guardian if applicable) is aware that this is a billable service. Verbal consent to proceed has been obtained within the past 12 months. The visit was conducted pursuant to the emergency declaration under the [facility], [address] [place] [address] and the [employer] General Act. Patient identification was verified, and a caregiver was present when appropriate. The patient was located in a state where the provider was credentialed to provide care. Patient identification was verified at the start of the visit, including the patient's telephone number and physical location. I discussed with the patient the nature of our telehealth visits, that:     1. Due to the nature of an audio- video modality, the only components of a physical exam that could be done are the elements supported by direct observation. 2. I would evaluate the patient and recommend diagnostics and treatments based on my assessment. 3. If it was felt that the patient should be evaluated in clinic or an emergency room setting, then they would be directed there. 4. Our sessions are not being recorded and that personal health information is protected. 5. Our team would provide follow up care in person if/when the patient needs it. Patient's location: home address in [facility]  Physician  location Office address is in [place] other people involved in call--daughter      Visit was not based on time    This visit was completed virtually using Doxy. me        [facility]     21  Iliana Zamudio : 3/1/1932 Sex: female  Age: 80 y.o. Chief Complaint   Patient presents with    Hypertension     1 month    Chronic Pain       HPI  Patient presents today via virtual video visit and is accompanied by her daughter today.   She is an 80-year-old lady who has great difficulty getting around with her chronic pain syndrome she is requesting a refill on her chronic narcotic medication. she has been on for some time now for chronic back pain. She states it does give her some relief would not be able to function without it. [place]AR[place] report was run and okay. No evidence of abuse or diversion of the medication. Daughter states that she has had more swelling to her legs recently since we cut back her Lasix because of renal function. I did tell her to go back up to 20 mg twice a day and will check labs again in a couple weeks. She does have home health coming into the house and we are monitoring her INRs through them. 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  She no longer follows with renal.         Review of Systems     Constitutional:  negative for activity change, chills, diaphoresis,  and fever.  Positive for fatigue  HENT: Negative for congestion, ear pain, hearing loss, postnasal drip, rhinorrhea and sinus pain.    Respiratory: Positive for shortness of breath-improved post hospitalization for CHF.  Negative for cough and wheezing.         COPD chronic oxygen use   Cardiovascular: Negative for chest pain, palpitations.  Positive for leg swelling. Some worsening recently and will increase her Lasix back to twice a day. Gastrointestinal: Positive for constipation.   Negative for abdominal pain, blood in stool, diarrhea,  and vomiting.         Endocrine: Negative.    Genitourinary: Negative for difficulty urinating, dysuria, hematuria and urgency.         Reports frequent UTIs .does have some incontinence of urine following with urology. Mishel Ayala hematuria has been worked up.   Musculoskeletal: Positive for arthralgias.  back pain, and gait problem       Chronic pain syndrome/back pain--lower back pain and bilateral hip pain/chronic-continues on narcotic pain medication with success. T11-L1 vertebroplasty. Successful.   Skin: Negative.    Neurological: Negative for dizziness, weakness, light-headedness, numbness and headaches. Hematological: Negative.    Psychiatric/Behavioral: Admits to being more emotional and angry since taken off of her Prozac.-Placed back on again with improvement            REST OF PERTINENT ROS GONE OVER AND WAS NEGATIVE. Current Outpatient Medications:     oxyCODONE-acetaminophen (PERCOCET)  MG per tablet, Take 1 tablet by mouth every 8 hours as needed for Pain for up to 30 days. , Disp: 90 tablet, Rfl: 0    lisinopril (ZESTRIL) 2.5 MG tablet, Take 1 tablet by mouth daily, Disp: 90 tablet, Rfl: 1    isosorbide mononitrate (IMDUR) 30 MG extended release tablet, Take 1 tablet by mouth daily Take morning of surgery with a sip of water, Disp: 90 tablet, Rfl: 1    FLUoxetine (PROZAC) 10 MG capsule, Take 1 capsule by mouth daily, Disp: 90 capsule, Rfl: 1    atorvastatin (LIPITOR) 40 MG tablet, Take 1 tablet by mouth daily, Disp: 90 tablet, Rfl: 1    levothyroxine (SYNTHROID) 50 MCG tablet, Take 1 tablet by mouth daily, Disp: 90 tablet, Rfl: 1    potassium chloride (KLOR-CON M) 20 MEQ extended release tablet, Take 20 mEq by mouth daily , Disp: , Rfl:     warfarin (COUMADIN) 1 MG tablet, Take 1 mg by mouth, Disp: , Rfl:     furosemide (LASIX) 20 MG tablet, Take 1 tablet by mouth 2 times daily (Patient taking differently: Take 20 mg by mouth daily ), Disp: 180 tablet, Rfl: 1    warfarin (COUMADIN) 5 MG tablet, TAKE 1 TABLET BY MOUTH ONCE DAILY, Disp: 90 tablet, Rfl: 1    polyethylene glycol (GLYCOLAX) 17 g packet, Take 17 g by mouth daily as needed for Constipation, Disp: , Rfl:     Cyanocobalamin (VITAMIN B-12) 5000 MCG TBDP, Take 5,000 mcg by mouth daily , Disp: , Rfl:     Lactobacillus (PROBIOTIC ACIDOPHILUS PO), Take 1 capsule by mouth daily , Disp: , Rfl:     docusate sodium (COLACE) 100 MG capsule, Take 100 mg by mouth 2 times daily , Disp: , Rfl:   No Known Allergies    Past Medical History:   Diagnosis Date    AAA (abdominal aortic aneurysm) (HCC)     Anticoagulant long-term use 2019    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)     CAD (coronary artery disease)     CHF (congestive heart failure) (HCC)     Chronic pain syndrome 2019    HTN (hypertension)     Hx of blood clots     right upper arm    Hyperlipemia      Past Surgical History:   Procedure Laterality Date    BACK SURGERY      [facility] for sciatic[facility], pt unsure    COLONOSCOPY      EYE SURGERY Bilateral     cataracts    FIXATION KYPHOPLASTY  10/02/2017    T12 kyphoplasty Dr Radu Flores KYPHOPLASTY  2017    T11-L1 kypho    HYSTERECTOMY      HYSTERECTOMY, TOTAL ABDOMINAL      TONSILLECTOMY      TRANSESOPHAGEAL ECHOCARDIOGRAM  11/04/15     Family History   Problem Relation Age of Onset    Breast Cancer Mother [de-identified]    Heart Disease Father     Cancer Sister         melanoma     Social History     Socioeconomic History    Marital status:      Spouse name: Not on file    Number of children: Not on file    Years of education: Not on file    Highest education level: Not on file   Occupational History    Not on file   Tobacco Use    Smoking status: Former Smoker     Packs/day: 0.25     Types: Cigarettes     Start date: 1952     Quit date: 1980     Years since quittin.6    Smokeless tobacco: Never Used   Vaping Use    Vaping Use: Never used   Substance and Sexual Activity    Alcohol use: No    Drug use: Never    Sexual activity: Never   Other Topics Concern    Not on file   Social History Narrative    Not on file     Social Determinants of Health     Financial Resource Strain: Low Risk     Difficulty of Paying Living Expenses: Not hard at all   Food Insecurity: No Food Insecurity    Worried About [address] in the Last Year: Never true    [address] in the Last Year: Never true   Transportation Needs:     Lack of Transportation (Medical): Not on file    Lack of Transportation (Non-Medical): Not on file   Physical Activity:     Days of Exercise per Week: Not on file    Minutes of Exercise per Session: Not on file   Stress:     Feeling of Stress : Not on file   Social Connections:     Frequency of Communication with Friends and Family: Not on file    Frequency of Social Gatherings with Friends and Family: Not on file    Attends [employer] Services: Not on file    Active Member of [address] [place] [address] [employer] or Organizations: Not on file    Attends Club or Organization Meetings: Not on file    Marital Status: Not on file   Intimate Partner Violence:     Fear of Current or Ex-Partner: Not on file    Emotionally Abused: Not on file    Physically Abused: Not on file    Sexually Abused: Not on file   Housing Stability:     Unable to Pay for Housing in the Last Year: Not on file    Number of [place] in the Last Year: Not on file    Unstable Housing in the Last Year: Not on file       There were no vitals filed for this visit. Physical Exam  Constitutional:       General: She is not in acute distress. Appearance: She is not ill-appearing. Pulmonary:      Effort: Pulmonary effort is normal.   Musculoskeletal:         General: Swelling present. Comments: Daughter showed me patient's legs and showed pitting. Neurological:      Mental Status: She is alert and oriented to person, place, and time. Psychiatric:         Mood and Affect: Mood normal.         Behavior: Behavior normal.                 Assessment and Plan:  Vipul Jauregui was seen today for hypertension and chronic pain. Diagnoses and all orders for this visit:    Chronic pain syndrome  -     oxyCODONE-acetaminophen (PERCOCET)  MG per tablet; Take 1 tablet by mouth every 8 hours as needed for Pain for up to 30 days. Anticoagulant long-term use  -     Basic Metabolic Panel;  Future    PAF (paroxysmal atrial fibrillation) (HCC)    Edema, unspecified type  -     Basic Metabolic Panel; Future    Other orders  -     lisinopril (ZESTRIL) 2.5 MG tablet; Take 1 tablet by mouth daily  -     isosorbide mononitrate (IMDUR) 30 MG extended release tablet; Take 1 tablet by mouth daily Take morning of surgery with a sip of water  -     FLUoxetine (PROZAC) 10 MG capsule; Take 1 capsule by mouth daily  -     atorvastatin (LIPITOR) 40 MG tablet; Take 1 tablet by mouth daily  -     levothyroxine (SYNTHROID) 50 MCG tablet; Take 1 tablet by mouth daily    Plan: Prescription management performed. See above body report. I will see back next month for her narcotic visit as well as regular visit. Check vitamin J55 and folic acid level next time around along with other blood work. Follow-up with above consultants. Fall precautions. Check labs in 2 weeks with CT increase in her Lasix. Notify us of problems in the interim. Return for keep appt. Seen By:  Romain Gray MD      *Document was created using voice recognition software. Note was reviewed however may contain grammatical errors.

## 2021-12-24 ENCOUNTER — HOSPITAL ENCOUNTER (INPATIENT)
Dept: HOSPITAL 83 - ED | Age: 86
LOS: 3 days | Discharge: HOME | DRG: 689 | End: 2021-12-27
Attending: INTERNAL MEDICINE | Admitting: INTERNAL MEDICINE
Payer: MEDICARE

## 2021-12-24 VITALS — DIASTOLIC BLOOD PRESSURE: 78 MMHG

## 2021-12-24 VITALS — BODY MASS INDEX: 45.18 KG/M2 | WEIGHT: 271.19 LBS | HEIGHT: 65 IN

## 2021-12-24 DIAGNOSIS — Z79.899: ICD-10-CM

## 2021-12-24 DIAGNOSIS — I27.20: ICD-10-CM

## 2021-12-24 DIAGNOSIS — Z80.3: ICD-10-CM

## 2021-12-24 DIAGNOSIS — F33.9: ICD-10-CM

## 2021-12-24 DIAGNOSIS — J96.11: ICD-10-CM

## 2021-12-24 DIAGNOSIS — Z20.822: ICD-10-CM

## 2021-12-24 DIAGNOSIS — Z79.1: ICD-10-CM

## 2021-12-24 DIAGNOSIS — G89.29: ICD-10-CM

## 2021-12-24 DIAGNOSIS — I50.810: ICD-10-CM

## 2021-12-24 DIAGNOSIS — K74.60: ICD-10-CM

## 2021-12-24 DIAGNOSIS — T14.8XXA: ICD-10-CM

## 2021-12-24 DIAGNOSIS — N30.01: Primary | ICD-10-CM

## 2021-12-24 DIAGNOSIS — E78.2: ICD-10-CM

## 2021-12-24 DIAGNOSIS — Z99.81: ICD-10-CM

## 2021-12-24 DIAGNOSIS — B96.20: ICD-10-CM

## 2021-12-24 DIAGNOSIS — I11.0: ICD-10-CM

## 2021-12-24 DIAGNOSIS — Z82.49: ICD-10-CM

## 2021-12-24 DIAGNOSIS — I50.33: ICD-10-CM

## 2021-12-24 DIAGNOSIS — Z79.01: ICD-10-CM

## 2021-12-24 DIAGNOSIS — E55.9: ICD-10-CM
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
## 2021-12-24 DIAGNOSIS — S09.90XA: ICD-10-CM
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
## 2021-12-24 DIAGNOSIS — N17.0: ICD-10-CM

## 2021-12-24 DIAGNOSIS — I71.4: ICD-10-CM

## 2021-12-24 DIAGNOSIS — M54.9: ICD-10-CM

## 2021-12-24 DIAGNOSIS — R26.2: ICD-10-CM

## 2021-12-24 DIAGNOSIS — S00.03XA: ICD-10-CM

## 2021-12-25 VITALS — DIASTOLIC BLOOD PRESSURE: 78 MMHG

## 2021-12-25 VITALS — SYSTOLIC BLOOD PRESSURE: 153 MMHG | DIASTOLIC BLOOD PRESSURE: 91 MMHG

## 2021-12-25 VITALS — DIASTOLIC BLOOD PRESSURE: 87 MMHG | SYSTOLIC BLOOD PRESSURE: 139 MMHG

## 2021-12-25 VITALS — SYSTOLIC BLOOD PRESSURE: 152 MMHG | DIASTOLIC BLOOD PRESSURE: 77 MMHG

## 2021-12-25 VITALS — DIASTOLIC BLOOD PRESSURE: 80 MMHG

## 2021-12-25 VITALS — DIASTOLIC BLOOD PRESSURE: 82 MMHG

## 2021-12-26 VITALS — DIASTOLIC BLOOD PRESSURE: 89 MMHG

## 2021-12-26 VITALS — DIASTOLIC BLOOD PRESSURE: 111 MMHG | SYSTOLIC BLOOD PRESSURE: 139 MMHG

## 2021-12-26 VITALS — DIASTOLIC BLOOD PRESSURE: 40 MMHG | SYSTOLIC BLOOD PRESSURE: 147 MMHG

## 2021-12-26 VITALS — DIASTOLIC BLOOD PRESSURE: 75 MMHG

## 2021-12-26 LAB
BASOPHILS # BLD AUTO: 0 10*3/UL (ref 0–0.1)
BASOPHILS NFR BLD AUTO: 0.5 % (ref 0–1)
BUN SERPL-MCNC: 14 MG/DL (ref 7–24)
CHLORIDE SERPL-SCNC: 107 MMOL/L (ref 98–107)
CREAT SERPL-MCNC: 0.96 MG/DL (ref 0.55–1.02)
EOSINOPHIL # BLD AUTO: 0.1 10*3/UL (ref 0–0.4)
EOSINOPHIL # BLD AUTO: 1.1 % (ref 1–4)
ERYTHROCYTE [DISTWIDTH] IN BLOOD BY AUTOMATED COUNT: 17.1 % (ref 0–14.5)
HCT VFR BLD AUTO: 42.2 % (ref 37–47)
INR BLD: 4.6 (ref 2–3.5)
LYMPHOCYTES # BLD AUTO: 0.8 10*3/UL (ref 1.3–4.4)
LYMPHOCYTES NFR BLD AUTO: 12.6 % (ref 27–41)
MCH RBC QN AUTO: 30.6 PG (ref 27–31)
MCHC RBC AUTO-ENTMCNC: 31.3 G/DL (ref 33–37)
MCV RBC AUTO: 97.9 FL (ref 81–99)
MONOCYTES # BLD AUTO: 0.6 10*3/UL (ref 0.1–1)
MONOCYTES NFR BLD MANUAL: 9.1 % (ref 3–9)
NEUT #: 4.8 10*3/UL (ref 2.3–7.9)
NEUT %: 76.5 % (ref 47–73)
NRBC BLD QL AUTO: 0 10*3/UL (ref 0–0)
PLATELET # BLD AUTO: 195 10*3/UL (ref 130–400)
PMV BLD AUTO: 10.7 FL (ref 9.6–12.3)
POTASSIUM SERPL-SCNC: 3.8 MMOL/L (ref 3.5–5.1)
RBC # BLD AUTO: 4.31 10*6/UL (ref 4.1–5.1)
SODIUM SERPL-SCNC: 137 MMOL/L (ref 136–145)
VITAMIN B12: > 2000 PG/ML (ref 247–911)
WBC NRBC COR # BLD AUTO: 6.3 10*3/UL (ref 4.8–10.8)

## 2021-12-27 ENCOUNTER — TELEPHONE (OUTPATIENT)
Dept: FAMILY MEDICINE CLINIC | Age: 86
End: 2021-12-27

## 2021-12-27 VITALS — DIASTOLIC BLOOD PRESSURE: 74 MMHG | SYSTOLIC BLOOD PRESSURE: 142 MMHG

## 2021-12-27 VITALS — DIASTOLIC BLOOD PRESSURE: 74 MMHG

## 2021-12-27 VITALS — DIASTOLIC BLOOD PRESSURE: 72 MMHG

## 2021-12-27 LAB
BASOPHILS # BLD AUTO: 0 10*3/UL (ref 0–0.1)
BASOPHILS NFR BLD AUTO: 0.8 % (ref 0–1)
BUN SERPL-MCNC: 13 MG/DL (ref 7–24)
CHLORIDE SERPL-SCNC: 107 MMOL/L (ref 98–107)
CREAT SERPL-MCNC: 1.02 MG/DL (ref 0.55–1.02)
EOSINOPHIL # BLD AUTO: 0.1 10*3/UL (ref 0–0.4)
EOSINOPHIL # BLD AUTO: 1.9 % (ref 1–4)
ERYTHROCYTE [DISTWIDTH] IN BLOOD BY AUTOMATED COUNT: 17.2 % (ref 0–14.5)
HCT VFR BLD AUTO: 41.5 % (ref 37–47)
INR BLD: 3.9 (ref 2–3.5)
LYMPHOCYTES # BLD AUTO: 0.8 10*3/UL (ref 1.3–4.4)
LYMPHOCYTES NFR BLD AUTO: 14.5 % (ref 27–41)
MCH RBC QN AUTO: 30.7 PG (ref 27–31)
MCHC RBC AUTO-ENTMCNC: 30.8 G/DL (ref 33–37)
MCV RBC AUTO: 99.5 FL (ref 81–99)
MONOCYTES # BLD AUTO: 0.7 10*3/UL (ref 0.1–1)
MONOCYTES NFR BLD MANUAL: 12.6 % (ref 3–9)
NEUT #: 3.7 10*3/UL (ref 2.3–7.9)
NEUT %: 69.8 % (ref 47–73)
NRBC BLD QL AUTO: 0 10*3/UL (ref 0–0)
PLATELET # BLD AUTO: 183 10*3/UL (ref 130–400)
PMV BLD AUTO: 10.3 FL (ref 9.6–12.3)
POTASSIUM SERPL-SCNC: 3.4 MMOL/L (ref 3.5–5.1)
RBC # BLD AUTO: 4.17 10*6/UL (ref 4.1–5.1)
SODIUM SERPL-SCNC: 139 MMOL/L (ref 136–145)
WBC NRBC COR # BLD AUTO: 5.2 10*3/UL (ref 4.8–10.8)

## 2021-12-27 NOTE — TELEPHONE ENCOUNTER
Tamica calling to get an appointment with you for a Hospital Follow up. She was inpatient at [facility]. following a series of falls. I cant see a place to put her on your schedule. There was a day with an opening around 8:00am, but they cant get her in here that early. Please advise when you can see her.

## 2021-12-29 ENCOUNTER — HOSPITAL ENCOUNTER (INPATIENT)
Dept: HOSPITAL 83 - ED | Age: 86
LOS: 2 days | Discharge: NURSING, REHAB OR LONG-TERM CARE | DRG: 291 | End: 2021-12-31
Attending: STUDENT IN AN ORGANIZED HEALTH CARE EDUCATION/TRAINING PROGRAM | Admitting: STUDENT IN AN ORGANIZED HEALTH CARE EDUCATION/TRAINING PROGRAM
Payer: MEDICARE

## 2021-12-29 VITALS — DIASTOLIC BLOOD PRESSURE: 77 MMHG

## 2021-12-29 VITALS — HEIGHT: 65 IN | BODY MASS INDEX: 25.88 KG/M2 | WEIGHT: 155.31 LBS

## 2021-12-29 VITALS — DIASTOLIC BLOOD PRESSURE: 76 MMHG

## 2021-12-29 VITALS — DIASTOLIC BLOOD PRESSURE: 66 MMHG

## 2021-12-29 DIAGNOSIS — I50.33: ICD-10-CM

## 2021-12-29 DIAGNOSIS — N17.0: ICD-10-CM

## 2021-12-29 DIAGNOSIS — I27.20: ICD-10-CM

## 2021-12-29 DIAGNOSIS — E87.2: ICD-10-CM

## 2021-12-29 DIAGNOSIS — E78.2: ICD-10-CM

## 2021-12-29 DIAGNOSIS — K74.60: ICD-10-CM

## 2021-12-29 DIAGNOSIS — I50.810: ICD-10-CM

## 2021-12-29 DIAGNOSIS — R26.2: ICD-10-CM

## 2021-12-29 DIAGNOSIS — E55.9: ICD-10-CM

## 2021-12-29 DIAGNOSIS — Z82.49: ICD-10-CM

## 2021-12-29 DIAGNOSIS — I13.0: Primary | ICD-10-CM

## 2021-12-29 DIAGNOSIS — Z86.718: ICD-10-CM

## 2021-12-29 DIAGNOSIS — I11.0: ICD-10-CM

## 2021-12-29 DIAGNOSIS — I08.2: ICD-10-CM

## 2021-12-29 DIAGNOSIS — Z79.899: ICD-10-CM

## 2021-12-29 DIAGNOSIS — F33.9: ICD-10-CM

## 2021-12-29 DIAGNOSIS — M54.9: ICD-10-CM

## 2021-12-29 DIAGNOSIS — G89.29: ICD-10-CM

## 2021-12-29 DIAGNOSIS — E44.0: ICD-10-CM

## 2021-12-29 DIAGNOSIS — Z20.822: ICD-10-CM

## 2021-12-29 DIAGNOSIS — Z99.81: ICD-10-CM

## 2021-12-29 DIAGNOSIS — Z79.1: ICD-10-CM

## 2021-12-29 DIAGNOSIS — J96.11: ICD-10-CM

## 2021-12-29 DIAGNOSIS — Z79.01: ICD-10-CM

## 2021-12-29 LAB
ALBUMIN SERPL-MCNC: 2.5 GM/DL (ref 3.1–4.5)
ALP SERPL-CCNC: 112 U/L (ref 45–117)
ALT SERPL W P-5'-P-CCNC: 18 U/L (ref 12–78)
APTT PPP: 34.7 SECONDS (ref 20–32.1)
AST SERPL-CCNC: 34 IU/L (ref 3–35)
BASOPHILS # BLD AUTO: 0 10*3/UL (ref 0–0.1)
BASOPHILS NFR BLD AUTO: 0.6 % (ref 0–1)
BUN SERPL-MCNC: 15 MG/DL (ref 7–24)
CHLORIDE SERPL-SCNC: 105 MMOL/L (ref 98–107)
CREAT SERPL-MCNC: 1.4 MG/DL (ref 0.55–1.02)
EOSINOPHIL # BLD AUTO: 0 10*3/UL (ref 0–0.4)
EOSINOPHIL # BLD AUTO: 0.6 % (ref 1–4)
ERYTHROCYTE [DISTWIDTH] IN BLOOD BY AUTOMATED COUNT: 17 % (ref 0–14.5)
HCT VFR BLD AUTO: 46.7 % (ref 37–47)
INR BLD: 2.5 (ref 2–3.5)
LIPASE SERPL-CCNC: 30 U/L (ref 73–393)
LYMPHOCYTES # BLD AUTO: 0.9 10*3/UL (ref 1.3–4.4)
LYMPHOCYTES NFR BLD AUTO: 13 % (ref 27–41)
MCH RBC QN AUTO: 30.6 PG (ref 27–31)
MCHC RBC AUTO-ENTMCNC: 31 G/DL (ref 33–37)
MCV RBC AUTO: 98.5 FL (ref 81–99)
MONOCYTES # BLD AUTO: 0.9 10*3/UL (ref 0.1–1)
MONOCYTES NFR BLD MANUAL: 13.5 % (ref 3–9)
NEUT #: 4.9 10*3/UL (ref 2.3–7.9)
NEUT %: 71.9 % (ref 47–73)
NRBC BLD QL AUTO: 0 10*3/UL (ref 0–0)
PLATELET # BLD AUTO: 203 10*3/UL (ref 130–400)
PMV BLD AUTO: 10.5 FL (ref 9.6–12.3)
POTASSIUM SERPL-SCNC: 3.7 MMOL/L (ref 3.5–5.1)
PROT SERPL-MCNC: 7.6 GM/DL (ref 6.4–8.2)
RBC # BLD AUTO: 4.74 10*6/UL (ref 4.1–5.1)
SARS-COV-2: NOT DETECTED
SODIUM SERPL-SCNC: 137 MMOL/L (ref 136–145)
WBC NRBC COR # BLD AUTO: 6.8 10*3/UL (ref 4.8–10.8)

## 2021-12-30 VITALS — DIASTOLIC BLOOD PRESSURE: 66 MMHG | SYSTOLIC BLOOD PRESSURE: 110 MMHG

## 2021-12-30 VITALS — SYSTOLIC BLOOD PRESSURE: 113 MMHG | DIASTOLIC BLOOD PRESSURE: 63 MMHG

## 2021-12-30 VITALS — DIASTOLIC BLOOD PRESSURE: 89 MMHG

## 2021-12-30 VITALS — DIASTOLIC BLOOD PRESSURE: 66 MMHG

## 2021-12-30 VITALS — DIASTOLIC BLOOD PRESSURE: 77 MMHG

## 2021-12-30 VITALS — SYSTOLIC BLOOD PRESSURE: 124 MMHG | DIASTOLIC BLOOD PRESSURE: 56 MMHG

## 2021-12-30 VITALS — DIASTOLIC BLOOD PRESSURE: 69 MMHG | SYSTOLIC BLOOD PRESSURE: 108 MMHG

## 2021-12-30 LAB
ALBUMIN SERPL-MCNC: 2.2 GM/DL (ref 3.1–4.5)
ALP SERPL-CCNC: 100 U/L (ref 45–117)
ALT SERPL W P-5'-P-CCNC: 16 U/L (ref 12–78)
AST SERPL-CCNC: 26 IU/L (ref 3–35)
BASOPHILS # BLD AUTO: 0 10*3/UL (ref 0–0.1)
BASOPHILS NFR BLD AUTO: 0.5 % (ref 0–1)
BUN SERPL-MCNC: 15 MG/DL (ref 7–24)
CHLORIDE SERPL-SCNC: 104 MMOL/L (ref 98–107)
CREAT SERPL-MCNC: 1.2 MG/DL (ref 0.55–1.02)
EOSINOPHIL # BLD AUTO: 0.1 10*3/UL (ref 0–0.4)
EOSINOPHIL # BLD AUTO: 0.9 % (ref 1–4)
EPI CELLS #/AREA URNS HPF: (no result) /[HPF]
ERYTHROCYTE [DISTWIDTH] IN BLOOD BY AUTOMATED COUNT: 16.7 % (ref 0–14.5)
HCT VFR BLD AUTO: 43.1 % (ref 37–47)
INR BLD: 2.7 (ref 2–3.5)
LYMPHOCYTES # BLD AUTO: 0.8 10*3/UL (ref 1.3–4.4)
LYMPHOCYTES NFR BLD AUTO: 12.9 % (ref 27–41)
MCH RBC QN AUTO: 30.6 PG (ref 27–31)
MCHC RBC AUTO-ENTMCNC: 31.6 G/DL (ref 33–37)
MCV RBC AUTO: 97.1 FL (ref 81–99)
MONOCYTES # BLD AUTO: 0.6 10*3/UL (ref 0.1–1)
MONOCYTES NFR BLD MANUAL: 9.8 % (ref 3–9)
NEUT #: 4.9 10*3/UL (ref 2.3–7.9)
NEUT %: 74.4 % (ref 47–73)
NRBC BLD QL AUTO: 0 % (ref 0–0)
PH UR STRIP: 7.5 [PH] (ref 4.5–8)
PLATELET # BLD AUTO: 200 10*3/UL (ref 130–400)
PMV BLD AUTO: 10.4 FL (ref 9.6–12.3)
POTASSIUM SERPL-SCNC: 3.6 MMOL/L (ref 3.5–5.1)
PROT SERPL-MCNC: 7 GM/DL (ref 6.4–8.2)
RBC # BLD AUTO: 4.44 10*6/UL (ref 4.1–5.1)
RBC #/AREA URNS HPF: (no result) RBC/HPF (ref 0–2)
SODIUM SERPL-SCNC: 137 MMOL/L (ref 136–145)
SP GR UR: <= 1.005 (ref 1–1.03)
UROBILINOGEN UR STRIP-MCNC: 1 E.U./DL (ref 0–1)
WBC #/AREA URNS HPF: (no result) WBC/HPF (ref 0–5)
WBC NRBC COR # BLD AUTO: 6.5 10*3/UL (ref 4.8–10.8)

## 2021-12-30 NOTE — TELEPHONE ENCOUNTER
I spoke to Ramon. She was back in the ER and then sent to the [facility] for a couple weeks for physical therapy.

## 2021-12-31 VITALS — SYSTOLIC BLOOD PRESSURE: 107 MMHG | DIASTOLIC BLOOD PRESSURE: 65 MMHG

## 2021-12-31 VITALS — SYSTOLIC BLOOD PRESSURE: 125 MMHG | DIASTOLIC BLOOD PRESSURE: 62 MMHG

## 2021-12-31 VITALS — DIASTOLIC BLOOD PRESSURE: 74 MMHG

## 2021-12-31 LAB
ALBUMIN SERPL-MCNC: 2.5 GM/DL (ref 3.1–4.5)
ALP SERPL-CCNC: 112 U/L (ref 45–117)
ALT SERPL W P-5'-P-CCNC: 17 U/L (ref 12–78)
AST SERPL-CCNC: 30 IU/L (ref 3–35)
BASOPHILS # BLD AUTO: 0 10*3/UL (ref 0–0.1)
BASOPHILS NFR BLD AUTO: 0.4 % (ref 0–1)
BUN SERPL-MCNC: 15 MG/DL (ref 7–24)
CHLORIDE SERPL-SCNC: 102 MMOL/L (ref 98–107)
CREAT SERPL-MCNC: 1.22 MG/DL (ref 0.55–1.02)
EOSINOPHIL # BLD AUTO: 0.1 10*3/UL (ref 0–0.4)
EOSINOPHIL # BLD AUTO: 1 % (ref 1–4)
ERYTHROCYTE [DISTWIDTH] IN BLOOD BY AUTOMATED COUNT: 16.9 % (ref 0–14.5)
HCT VFR BLD AUTO: 46.7 % (ref 37–47)
LYMPHOCYTES # BLD AUTO: 0.8 10*3/UL (ref 1.3–4.4)
LYMPHOCYTES NFR BLD AUTO: 12 % (ref 27–41)
MCH RBC QN AUTO: 30.6 PG (ref 27–31)
MCHC RBC AUTO-ENTMCNC: 30.8 G/DL (ref 33–37)
MCV RBC AUTO: 99.4 FL (ref 81–99)
MONOCYTES # BLD AUTO: 0.8 10*3/UL (ref 0.1–1)
MONOCYTES NFR BLD MANUAL: 11.7 % (ref 3–9)
NEUT #: 5.2 10*3/UL (ref 2.3–7.9)
NEUT %: 74.8 % (ref 47–73)
NRBC BLD QL AUTO: 0 10*3/UL (ref 0–0)
PLATELET # BLD AUTO: 191 10*3/UL (ref 130–400)
PMV BLD AUTO: 10.6 FL (ref 9.6–12.3)
POTASSIUM SERPL-SCNC: 3.8 MMOL/L (ref 3.5–5.1)
PROT SERPL-MCNC: 8.1 GM/DL (ref 6.4–8.2)
RBC # BLD AUTO: 4.7 10*6/UL (ref 4.1–5.1)
SODIUM SERPL-SCNC: 135 MMOL/L (ref 136–145)
WBC NRBC COR # BLD AUTO: 7 10*3/UL (ref 4.8–10.8)

## 2022-01-13 ENCOUNTER — TELEPHONE (OUTPATIENT)
Dept: FAMILY MEDICINE CLINIC | Age: 87
End: 2022-01-13

## 2022-01-14 ENCOUNTER — TELEPHONE (OUTPATIENT)
Dept: FAMILY MEDICINE CLINIC | Age: 87
End: 2022-01-14

## 2022-01-14 NOTE — TELEPHONE ENCOUNTER
Dr Mikal Covington, Please close after review. After speaking with Dr Mikal Covington personally, he advised that Gia Hodgson should be taking 2.5mg Coumadin daily, and will need this checked on Monday - 1/17/2022    I spoke to Ana Luisa Pierson at [facility] and had it arranged to do this. He put her on the schedule. I notified Jennie Chaidez that they would be out Monday to check this.

## 2022-01-14 NOTE — TELEPHONE ENCOUNTER
Notified the patients daughter Donald Sanders last night that a physician at the facility needs to order the bed as we do not know any of the diagnoses for this patient nor what all is going on. Daughter verbalized understanding. I did request records from [facility] and received them all last night.

## 2022-01-14 NOTE — TELEPHONE ENCOUNTER
Jerica Hobson just brought Juliaetta home from the [facility]  home in [place]. Her only concern is the dose of coumadin she is on. She has been on 6mg and had an INR of 9.3. on Jan 9th. She was given a dose of Vit K on the 9th, and told to hold her dose until Yesterday. Yesterday the INR was 2.3, so they put her back on 6mg per day, and was going to recheck that on Monday. But now she is home and they are worried that 6mg is too high for her, because you have never had her on a dose that high. Also asking if she can get this checked by the [address] [place] [address] [facility] on Monday? The Nursing home sent me a flow sheet and it has been faxed in to the chart.

## 2022-01-16 LAB — INR BLD: 3.7

## 2022-01-18 ENCOUNTER — ANTI-COAG VISIT (OUTPATIENT)
Dept: FAMILY MEDICINE CLINIC | Age: 87
End: 2022-01-18

## 2022-01-18 ENCOUNTER — HOSPITAL ENCOUNTER (EMERGENCY)
Dept: HOSPITAL 83 - ED | Age: 87
Discharge: HOME | End: 2022-01-18
Payer: MEDICARE

## 2022-01-18 VITALS — WEIGHT: 145 LBS | HEIGHT: 63.98 IN | BODY MASS INDEX: 24.75 KG/M2

## 2022-01-18 VITALS — DIASTOLIC BLOOD PRESSURE: 53 MMHG | SYSTOLIC BLOOD PRESSURE: 102 MMHG

## 2022-01-18 DIAGNOSIS — Z79.01: ICD-10-CM

## 2022-01-18 DIAGNOSIS — E87.8: ICD-10-CM

## 2022-01-18 DIAGNOSIS — K74.60: Primary | ICD-10-CM

## 2022-01-18 DIAGNOSIS — N17.9: ICD-10-CM

## 2022-01-18 DIAGNOSIS — Z90.710: ICD-10-CM

## 2022-01-18 DIAGNOSIS — Z87.891: ICD-10-CM

## 2022-01-18 DIAGNOSIS — Z98.890: ICD-10-CM

## 2022-01-18 DIAGNOSIS — N18.9: ICD-10-CM

## 2022-01-18 DIAGNOSIS — Z79.899: ICD-10-CM

## 2022-01-18 DIAGNOSIS — E88.09: ICD-10-CM

## 2022-01-18 LAB
ALBUMIN SERPL-MCNC: 1.9 GM/DL (ref 3.1–4.5)
ALP SERPL-CCNC: 96 U/L (ref 45–117)
ALT SERPL W P-5'-P-CCNC: 13 U/L (ref 12–78)
AST SERPL-CCNC: 21 IU/L (ref 3–35)
BASOPHILS # BLD AUTO: 0 10*3/UL (ref 0–0.1)
BASOPHILS NFR BLD AUTO: 0.7 % (ref 0–1)
BUN SERPL-MCNC: 39 MG/DL (ref 7–24)
CASTS URNS QL MICRO: (no result)
CHLORIDE SERPL-SCNC: 105 MMOL/L (ref 98–107)
CREAT SERPL-MCNC: 1.52 MG/DL (ref 0.55–1.02)
EOSINOPHIL # BLD AUTO: 0 10*3/UL (ref 0–0.4)
EOSINOPHIL # BLD AUTO: 0.7 % (ref 1–4)
EPI CELLS #/AREA URNS HPF: (no result) /[HPF]
ERYTHROCYTE [DISTWIDTH] IN BLOOD BY AUTOMATED COUNT: 16.9 % (ref 0–14.5)
HCT VFR BLD AUTO: 43.2 % (ref 37–47)
LYMPHOCYTES # BLD AUTO: 1 10*3/UL (ref 1.3–4.4)
LYMPHOCYTES NFR BLD AUTO: 15.8 % (ref 27–41)
MCH RBC QN AUTO: 30.5 PG (ref 27–31)
MCHC RBC AUTO-ENTMCNC: 31.9 G/DL (ref 33–37)
MCV RBC AUTO: 95.4 FL (ref 81–99)
MONOCYTES # BLD AUTO: 0.7 10*3/UL (ref 0.1–1)
MONOCYTES NFR BLD MANUAL: 11.4 % (ref 3–9)
NEUT #: 4.4 10*3/UL (ref 2.3–7.9)
NEUT %: 71.1 % (ref 47–73)
NRBC BLD QL AUTO: 0 % (ref 0–0)
PH UR STRIP: 5 [PH] (ref 4.5–8)
PLATELET # BLD AUTO: 305 10*3/UL (ref 130–400)
PMV BLD AUTO: 9.6 FL (ref 9.6–12.3)
POTASSIUM SERPL-SCNC: 5.3 MMOL/L (ref 3.5–5.1)
PROT SERPL-MCNC: 7.2 GM/DL (ref 6.4–8.2)
RBC # BLD AUTO: 4.53 10*6/UL (ref 4.1–5.1)
RBC #/AREA URNS HPF: (no result) RBC/HPF (ref 0–2)
SODIUM SERPL-SCNC: 134 MMOL/L (ref 136–145)
SP GR UR: 1.01 (ref 1–1.03)
UROBILINOGEN UR STRIP-MCNC: 1 E.U./DL (ref 0–1)
WBC #/AREA URNS HPF: (no result) WBC/HPF (ref 0–5)
WBC NRBC COR # BLD AUTO: 6.2 10*3/UL (ref 4.8–10.8)

## 2022-01-19 RX ORDER — WARFARIN SODIUM 2 MG/1
2 TABLET ORAL DAILY
Qty: 90 TABLET | Refills: 1 | Status: SHIPPED | OUTPATIENT
Start: 2022-01-19

## 2022-01-19 RX ORDER — WARFARIN SODIUM 2 MG/1
2 TABLET ORAL DAILY
COMMUNITY
End: 2022-01-19 | Stop reason: SDUPTHER

## 2022-01-19 NOTE — PROGRESS NOTES
Per Dr Willie Rinaldi, advised daughter Jerica Hobson to have patient hold coumadin again today. So she has held it yesterday and today (1/18 & 1/19). Then go to 2mg daily. Recheck INR in 1 week. Fax sent to [facility] with order written on it.

## 2022-01-21 ENCOUNTER — TELEPHONE (OUTPATIENT)
Dept: FAMILY MEDICINE CLINIC | Age: 87
End: 2022-01-21

## 2022-01-21 NOTE — TELEPHONE ENCOUNTER
I agree with the emergency room acutely and with this overall decline I feel in her best interest that they should transfer her care to visiting physician so somebody could come out to the house for her. I am certainly not going to be able to manage all of this over the phone with calls every day.

## 2022-01-21 NOTE — TELEPHONE ENCOUNTER
Spoke with Es/nurse from [facility]. She wanted you to know that there has been a huge decline in patients health. She states that pt's abdomen is distended, and bilateral lower extremity pitting edema +4. Pt had been able to walk but is now a max assist of 2. Per [facility] she advised family that pt needed to go to ED for pt to be evaluated. She is not sure if they took patient. She also had discussed with family about Hospice but states the family doesn't seem to be in agreement with Hospice. I advised that pt should be seen through ED, [facility] will relay this information to them again. Please advise if there is anything else.

## 2022-01-24 ENCOUNTER — TELEPHONE (OUTPATIENT)
Dept: FAMILY MEDICINE CLINIC | Age: 87
End: 2022-01-24

## 2022-01-24 DIAGNOSIS — I10 ESSENTIAL HYPERTENSION: ICD-10-CM

## 2022-01-24 DIAGNOSIS — I25.10 CORONARY ARTERY DISEASE INVOLVING NATIVE CORONARY ARTERY OF NATIVE HEART WITHOUT ANGINA PECTORIS: ICD-10-CM

## 2022-01-24 DIAGNOSIS — J44.9 CHRONIC OBSTRUCTIVE PULMONARY DISEASE, UNSPECIFIED COPD TYPE (HCC): Primary | ICD-10-CM

## 2022-01-24 DIAGNOSIS — I50.32 CHRONIC HEART FAILURE WITH PRESERVED EJECTION FRACTION (HCC): ICD-10-CM

## 2022-01-24 DIAGNOSIS — E03.9 HYPOTHYROIDISM, UNSPECIFIED TYPE: ICD-10-CM

## 2022-01-24 DIAGNOSIS — Z91.81 AT HIGH RISK FOR FALLS: ICD-10-CM

## 2022-01-24 NOTE — TELEPHONE ENCOUNTER
We can do a virtual visit Wednesday if need be. I am not going to be able to continue to take care of her in this fashion however. I feel she needs visiting physician as I have said before. They can go into the home. She is probably a hospice candidate as well. This will need to be discussed at the visit. Also [address] [place] [address] sent me a note just now stating that they are not accepting any new home care patients.

## 2022-01-24 NOTE — TELEPHONE ENCOUNTER
I put referral in unsure how much help with her going to be. If she is needing one-on-one care probably going to have to hire somebody to come into the house. Should also consider visiting physician and transferring her care to them.

## 2022-01-24 NOTE — TELEPHONE ENCOUNTER
Lalo Salazar with PT calling to say that she is discharging from PT and that they also believe patient needs hospice that she is not appropriate for palliative care.

## 2022-01-24 NOTE — TELEPHONE ENCOUNTER
Patient's daughter called and stated the patient was in the ER Friday night into Saturday morning last week. They have [address] coming in to care for her but she is stating that it is not enough and as asking if palliative care could be set up for her to come to the home?

## 2022-01-24 NOTE — TELEPHONE ENCOUNTER
Patient's daughter notified. She said the patient has an appointment with you this wednesday and she said there is no way she will be able to make it with her health declining so quickly. Can we do a virtual or should it be canceled?

## 2022-01-24 NOTE — TELEPHONE ENCOUNTER
I think she is appropriate for hospice but she is is not DNR as of yet as far as I know. She will need to be DNR before hospice is going to accept her. She has a virtual visit on Wednesday and all this can be discussed at that visit. I do not want to go back and forth anymore.

## 2022-01-24 NOTE — TELEPHONE ENCOUNTER
Daughter Lamar Bush calling in [address] care visiting nurse is recommending hospice. Will you refer to hospice? I did change her to a VV.

## 2022-01-26 ENCOUNTER — VIRTUAL VISIT (OUTPATIENT)
Dept: FAMILY MEDICINE CLINIC | Age: 87
End: 2022-01-26
Payer: MEDICARE

## 2022-01-26 ENCOUNTER — ANTI-COAG VISIT (OUTPATIENT)
Dept: FAMILY MEDICINE CLINIC | Age: 87
End: 2022-01-26

## 2022-01-26 DIAGNOSIS — R62.7 FAILURE TO THRIVE IN ADULT: ICD-10-CM

## 2022-01-26 DIAGNOSIS — J44.9 CHRONIC OBSTRUCTIVE PULMONARY DISEASE, UNSPECIFIED COPD TYPE (HCC): ICD-10-CM

## 2022-01-26 DIAGNOSIS — I71.40 ABDOMINAL AORTIC ANEURYSM (AAA) WITHOUT RUPTURE: ICD-10-CM

## 2022-01-26 DIAGNOSIS — K74.60 CIRRHOSIS OF LIVER WITH ASCITES, UNSPECIFIED HEPATIC CIRRHOSIS TYPE (HCC): ICD-10-CM

## 2022-01-26 DIAGNOSIS — N39.0 URINARY TRACT INFECTION WITHOUT HEMATURIA, SITE UNSPECIFIED: ICD-10-CM

## 2022-01-26 DIAGNOSIS — I48.0 PAF (PAROXYSMAL ATRIAL FIBRILLATION) (HCC): ICD-10-CM

## 2022-01-26 DIAGNOSIS — Z79.01 ANTICOAGULANT LONG-TERM USE: ICD-10-CM

## 2022-01-26 DIAGNOSIS — R63.0 APPETITE LOSS: ICD-10-CM

## 2022-01-26 DIAGNOSIS — I25.10 CORONARY ARTERY DISEASE INVOLVING NATIVE CORONARY ARTERY OF NATIVE HEART WITHOUT ANGINA PECTORIS: ICD-10-CM

## 2022-01-26 DIAGNOSIS — G89.4 CHRONIC PAIN SYNDROME: ICD-10-CM

## 2022-01-26 DIAGNOSIS — Z91.81 AT HIGH RISK FOR FALLS: ICD-10-CM

## 2022-01-26 DIAGNOSIS — R63.4 WEIGHT LOSS: ICD-10-CM

## 2022-01-26 DIAGNOSIS — R18.8 CIRRHOSIS OF LIVER WITH ASCITES, UNSPECIFIED HEPATIC CIRRHOSIS TYPE (HCC): ICD-10-CM

## 2022-01-26 DIAGNOSIS — E03.9 HYPOTHYROIDISM, UNSPECIFIED TYPE: ICD-10-CM

## 2022-01-26 DIAGNOSIS — I10 ESSENTIAL HYPERTENSION: ICD-10-CM

## 2022-01-26 DIAGNOSIS — I50.32 CHRONIC HEART FAILURE WITH PRESERVED EJECTION FRACTION (HCC): Primary | ICD-10-CM

## 2022-01-26 LAB — INR BLD: 2.6

## 2022-01-26 PROCEDURE — G8484 FLU IMMUNIZE NO ADMIN: HCPCS | Performed by: INTERNAL MEDICINE

## 2022-01-26 PROCEDURE — 1090F PRES/ABSN URINE INCON ASSESS: CPT | Performed by: INTERNAL MEDICINE

## 2022-01-26 PROCEDURE — G8420 CALC BMI NORM PARAMETERS: HCPCS | Performed by: INTERNAL MEDICINE

## 2022-01-26 PROCEDURE — 99215 OFFICE O/P EST HI 40 MIN: CPT | Performed by: INTERNAL MEDICINE

## 2022-01-26 PROCEDURE — 1036F TOBACCO NON-USER: CPT | Performed by: INTERNAL MEDICINE

## 2022-01-26 PROCEDURE — G8427 DOCREV CUR MEDS BY ELIG CLIN: HCPCS | Performed by: INTERNAL MEDICINE

## 2022-01-26 PROCEDURE — 3023F SPIROM DOC REV: CPT | Performed by: INTERNAL MEDICINE

## 2022-01-26 PROCEDURE — 1123F ACP DISCUSS/DSCN MKR DOCD: CPT | Performed by: INTERNAL MEDICINE

## 2022-01-26 PROCEDURE — 4040F PNEUMOC VAC/ADMIN/RCVD: CPT | Performed by: INTERNAL MEDICINE

## 2022-01-26 RX ORDER — OXYCODONE AND ACETAMINOPHEN 10; 325 MG/1; MG/1
1 TABLET ORAL EVERY 4 HOURS PRN
COMMUNITY

## 2022-01-26 RX ORDER — OXYCODONE AND ACETAMINOPHEN 10; 325 MG/1; MG/1
1 TABLET ORAL EVERY 4 HOURS PRN
Qty: 90 TABLET | Refills: 0 | Status: CANCELLED | OUTPATIENT
Start: 2022-01-26 | End: 2022-02-25

## 2022-01-26 ASSESSMENT — ENCOUNTER SYMPTOMS
NAUSEA: 0
CONSTIPATION: 1
BACK PAIN: 1
ABDOMINAL PAIN: 0
ABDOMINAL DISTENTION: 1
SHORTNESS OF BREATH: 1
COUGH: 0
WHEEZING: 0
DIARRHEA: 1
VOMITING: 0

## 2022-01-26 NOTE — PROGRESS NOTES
Dr Gordo Tavares received a call from Randi with [facility] with patient's INR results of 2.6. Dr Gordo Tavares advised her to have the patient hold the dose today and then go to 1 mg daily. Recheck INR on Monday 1/31.

## 2022-01-27 NOTE — PROGRESS NOTES
TeleMedicine Video Visit    Cha Mares, was evaluated through a synchronous (real-time) audio-video encounter. The patient (or guardian if applicable) is aware that this is a billable service. , which includes applicable co-pays. This virtual visit was conducted with the patient's  (and/or legal guardian's) consent. The visit was conducted pursuant to the emergency declaration under the [facility], [address] [place] [address] authority and the [employer] Act. Patient identification was verified, and a caregiver was present when appropriate. The patient was located in a state where the provider was licensed to provide care. Patient identification was verified at the start of the visit, including the patient's telephone number and physical location. I discussed with the patient the nature of our telehealth visits, that:     1. Due to the nature of an audio- video modality, the only components of a physical exam that could be done are the elements supported by direct observation. 2. I would evaluate the patient and recommend diagnostics and treatments based on my assessment. 3. If it was felt that the patient should be evaluated in clinic or an emergency room setting, then they would be directed there. 4. Our sessions are not being recorded and that personal health information is protected. 5. Our team would provide follow up care in person if/when the patient needs it. Patient's location: home address in [facility] . Physician  location in [place]. . other people involved in call 2 daughters(Shana and Cheryle Barefoot) and son Marko Elizabeth).  was also in the background      Visit was billed by time    This visit was completed virtually using [facility]     22  Cha Mares : 3/1/1932 Sex: female  Age: 80 y.o.     Chief Complaint   Patient presents with    Other     Discuss DNR for hospice care    shortness of breath. Negative for cough and wheezing. Cardiovascular: Positive for leg swelling. Negative for chest pain and palpitations. Gastrointestinal: Positive for abdominal distention, constipation and diarrhea. Negative for abdominal pain, nausea and vomiting. See above   Genitourinary: Negative for dysuria, flank pain and hematuria. Currently treating UTI with Levaquin   Musculoskeletal: Positive for back pain. Chronic pain syndrome   Neurological: Positive for dizziness, weakness and light-headedness. Negative for syncope and headaches. REST OF PERTINENT ROS GONE OVER AND WAS NEGATIVE. Past medical history:  Problem List: Chronic pain syndrome, Long-term current use of anticoagulant, Anticoagulant, Osteoarthritis, Taking  medication, Hyperlipidemia  Health Maintenance:  Mammogram - (1/23/2019)  Mammogram Screening - (1/23/2019)  Influenza Vaccination - (11/21/2018)  Bone Density Scan - (1/11/2011)  Colonoscopy - (11/12/2015)  Couseled on Home Safety - (4/12/2017)  Bone Density Test Screening - (5/23/2014)  Stress Test - 1/08-ok,3/11  EKG - 7/09,11,3/13  Rectal Exam - 8/10  Breast Exam - 8/10,declined,2/14  Hemmocult Cards - 2/13-neg x 3  2D ECHO - 8/15  Mini Mental Status - 4/19--29/30  Medical Problems:  Osteoarthritis  DVT - right-1/04  Hypertension  Lumbar Spondylosis-Herniated Disc-Spinal Stenosis - epidurals/[place]  Hyperlipidemia, YAIR-BSO--Non Cancerous  Small AAA - 3.4cm--- 2/19.--3.5 cm per CT urogram, CT abdomen-920  HX FX Left Ankle, Chronic Greater Trochanteric Bursitis, Depression, Cataracts  Coronary Artery Disease (CAD) - Previously declined cardiology fu  neurogenic claudication  Spinal Stenosis - L2-5 lumbar laminectomy-dr [facility]sey,[facility]  Kidney Stones, Anxiety, Diverticulitis, Osteoporosis, post laminectomy syndrome lumbar spine  Goiter - multinodular  hematuria - cystoscopy/retrograde-worked up by urology.   urethral stenosis - dialated  chronic urethral inflammation, strep bovis bacteremia, Congestive Heart Failure (CHF), arterial thrombosis right arm  T12 compression fracture. - Vertebroplasty  Lumbar Spondylosis-Herniated Disc-Spinal Stenosis  T11 and L1 kyphoplasty -   Dilated nonischemic cardiomyopathy - Follows with cardiology  Copd  Hospitalization for congestive heart failure volume overload, ascites-  Cirrhosis per CAT scan  Aortic regurgitation-moderate  Pulmonary hypertension  Hospitalization--HFpEF-3/21  ER visit for supratherapeutic INR greater than 10-  Hospitalization for fall, weakness, junctional heart rhythm-  Hypothyroidism-10/21  Macrocytosis-evaluated by heme/onc-  Reviewed and updated. FH:  Father:  MI -  late 63's. Mother:  Breast Cancer - ? ?  AGE 80. Son 1:  Chronic Obstructive Pulmonary Disease (COPD) -  age 61. Sister 1:  melanoma -  age in her 52's. Reviewed, no changes. SH:  Marital: . Personal Habits: Cigarette Use: Does Not Smoke. Alcohol: does not use alcohol.   Reviewed, no changes.                Current Outpatient Medications:     oxyCODONE-acetaminophen (PERCOCET)  MG per tablet, Take 1 tablet by mouth every 4 hours as needed for Pain., Disp: , Rfl:     warfarin (COUMADIN) 2 MG tablet, Take 1 tablet by mouth daily, Disp: 90 tablet, Rfl: 1    lisinopril (ZESTRIL) 2.5 MG tablet, Take 1 tablet by mouth daily, Disp: 90 tablet, Rfl: 1    isosorbide mononitrate (IMDUR) 30 MG extended release tablet, Take 1 tablet by mouth daily Take morning of surgery with a sip of water, Disp: 90 tablet, Rfl: 1    FLUoxetine (PROZAC) 10 MG capsule, Take 1 capsule by mouth daily, Disp: 90 capsule, Rfl: 1    atorvastatin (LIPITOR) 40 MG tablet, Take 1 tablet by mouth daily, Disp: 90 tablet, Rfl: 1    levothyroxine (SYNTHROID) 50 MCG tablet, Take 1 tablet by mouth daily, Disp: 90 tablet, Rfl: 1    warfarin (COUMADIN) 1 MG tablet, Take 1 mg by mouth, Disp: , Rfl:    furosemide (LASIX) 20 MG tablet, Take 1 tablet by mouth 2 times daily (Patient taking differently: Take 20 mg by mouth daily ), Disp: 180 tablet, Rfl: 1    warfarin (COUMADIN) 5 MG tablet, TAKE 1 TABLET BY MOUTH ONCE DAILY, Disp: 90 tablet, Rfl: 1    polyethylene glycol (GLYCOLAX) 17 g packet, Take 17 g by mouth daily as needed for Constipation, Disp: , Rfl:     Cyanocobalamin (VITAMIN B-12) 5000 MCG TBDP, Take 5,000 mcg by mouth daily , Disp: , Rfl:     Lactobacillus (PROBIOTIC ACIDOPHILUS PO), Take 1 capsule by mouth daily , Disp: , Rfl:     docusate sodium (COLACE) 100 MG capsule, Take 100 mg by mouth 2 times daily , Disp: , Rfl:   No Known Allergies    Past Medical History:   Diagnosis Date    AAA (abdominal aortic aneurysm) (HCC)     Anticoagulant long-term use 5/29/2019    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)     CAD (coronary artery disease)     CHF (congestive heart failure) (HCC)     Chronic pain syndrome 5/29/2019    HTN (hypertension)     Hx of blood clots 2015    right upper arm    Hyperlipemia      Past Surgical History:   Procedure Laterality Date    BACK SURGERY  2010    [facility] for sciatica, pt unsure    COLONOSCOPY      EYE SURGERY Bilateral     cataracts    FIXATION KYPHOPLASTY  10/02/2017    T12 kyphoplasty Dr Sheryle Rocker    FIXATION KYPHOPLASTY  11/20/2017    T11-L1 kypho    HYSTERECTOMY      HYSTERECTOMY, TOTAL ABDOMINAL      TONSILLECTOMY      TRANSESOPHAGEAL ECHOCARDIOGRAM  11/04/15     Family History   Problem Relation Age of Onset    Breast Cancer Mother [de-identified]    Heart Disease Father     Cancer Sister         melanoma     Social History     Socioeconomic History    Marital status:      Spouse name: Not on file    Number of children: Not on file    Years of education: Not on file    Highest education level: Not on file   Occupational History    Not on file   Tobacco Use    Smoking status: Former Smoker     Packs/day: 0.25     Types: Cigarettes Start date: 1952     Quit date: 1980     Years since quittin.7    Smokeless tobacco: Never Used   Vaping Use    Vaping Use: Never used   Substance and Sexual Activity    Alcohol use: No    Drug use: Never    Sexual activity: Never   Other Topics Concern    Not on file   Social History Narrative    Not on file     Social Determinants of Health     Financial Resource Strain: Low Risk     Difficulty of Paying Living Expenses: Not hard at all   Food Insecurity: No Food Insecurity    Worried About [address] in the Last Year: Never true    [facility] in the Last Year: Never true   Transportation Needs:     Lack of Transportation (Medical): Not on file    Lack of Transportation (Non-Medical): Not on file   Physical Activity:     Days of Exercise per Week: Not on file    Minutes of Exercise per Session: Not on file   Stress:     Feeling of Stress : Not on file   Social Connections:     Frequency of Communication with Friends and Family: Not on file    Frequency of Social Gatherings with Friends and Family: Not on file    Attends [employer] Services: Not on file    Active Member of [address] [place] [address] or Organizations: Not on file    Attends Club or Organization Meetings: Not on file    Marital Status: Not on file   Intimate Partner Violence:     Fear of Current or Ex-Partner: Not on file    Emotionally Abused: Not on file    Physically Abused: Not on file    Sexually Abused: Not on file   Housing Stability:     Unable to Pay for Housing in the Last Year: Not on file    Number of [place] in the Last Year: Not on file    Unstable Housing in the Last Year: Not on file       There were no vitals filed for this visit. Physical Exam  Constitutional:       Appearance: She is ill-appearing.       Comments: Groggy, weak and ill-appearing laying in bed   Pulmonary:      Effort: Pulmonary effort is normal.   Musculoskeletal:      Comments: I did asked shoulder legs and she does have some swelling but they state it is gone down significantly. Neurological:      Comments: She did appear to be oriented stating it is not sleep                 Assessment and Plan:  Lise Gray was seen today for other and chronic pain. Diagnoses and all orders for this visit:    Chronic heart failure with preserved ejection fraction ([facility]  -     Basic Metabolic Panel; Future  -     External Referral To Hospice    PAF (paroxysmal atrial fibrillation) ([facility]  -     External Referral To Hospice    Chronic obstructive pulmonary disease, unspecified COPD type ([facility]  -     External Referral To Hospice    Abdominal aortic aneurysm (AAA) without rupture ([facility]  -     External Referral To Hospice    At high risk for falls  -     External Referral To Hospice    Hypothyroidism, unspecified type  -     External Referral To Hospice    Essential hypertension  -     External Referral To Hospice    Coronary artery disease involving native coronary artery of native heart without angina pectoris  -     External Referral To Hospice    Chronic pain syndrome  -     External Referral To Hospice    Anticoagulant long-term use  -     External Referral To Hospice    Weight loss  -     External Referral To Hospice    Appetite loss  -     External Referral To Hospice    Failure to thrive in adult  -     External Referral To Hospice    Urinary tract infection without hematuria, site unspecified  -     External Referral To Hospice    Cirrhosis of liver with ascites, unspecified hepatic cirrhosis type ([facility]    Plan: Daughter brings up hospice I dispute after seeing this lady seeing what happened recently and a marked downhill course over the last several weeks feels would be a candidate for hospice at age 25 soon-to-be 80. This is as bad as I have seen this lady today. We a long discussion about DNR status but she is want this we have not documented that before but this she states has been her wishes and  concurs.   She wants no heroics including intubation, ventilation, CPR, shock or other heroic measures. Form will be filled out the understanding medications. They realize that this can be rescinded if they choose at any time . They do want hospice involved and I will send order. Home health nurse will check INR and BMP on Monday cut back Lasix and hold potassium and stool softener. I told them if she goes with hospice I will have the hospice physician manage her care. Answered all questions to her satisfaction. Encounter including face-to-face time with chart review during and after visit to include all pertinent documents listed above full discussion with family regarding DNR and hospice care was 55 minutes. No follow-ups on file. Seen By:  Chelsey Cardoso MD      *Document was created using voice recognition software. Note was reviewed however may contain grammatical errors.

## 2022-01-28 ENCOUNTER — TELEPHONE (OUTPATIENT)
Dept: FAMILY MEDICINE CLINIC | Age: 87
End: 2022-01-28

## 2022-01-28 NOTE — TELEPHONE ENCOUNTER
JACKLYN/ [address]        [phone]        She is calling to find out how often you recommend checking her INR? It was last checked on 1/26 and was 2.3.

## 2022-01-28 NOTE — TELEPHONE ENCOUNTER
Relayed this to Melissa. She will be going out there on Tuesday and asked if Tuesday would be ok with you?

## 2022-10-13 NOTE — TELEPHONE ENCOUNTER
Needs to have a physician at the facility order the bed. I have no information and have not seen the patient. yes

## 2022-11-17 NOTE — TELEPHONE ENCOUNTER
ABIs indicated arterial disease. Please refer to vascular Daughter - Yonny Duval       She is calling to ask if you will give her a refill on Percocet? She missed her appt with you last week because they both fell asleep on the couch and slept thru her appt time. She has an appt scheduled for July 24th.       I have it Q'd to send to [facility]

## 2024-04-04 NOTE — PROGRESS NOTES
[facility]     19  Elham Mckeon : 3/1/1932 Sex: female  Age: 80 y.o. No chief complaint on file. HPI patient presents today for a very extended visit in 4 narcotic follow-up INR which was 2.2 and therapeutic in follow-up visit. Much confusion today states she got lab work last month which she did not and we verified this with the lab. I told her She needs to get lab today. She is requesting her narcotic for chronic pain. She takes it 3 times a day typically she has been cautioned against using excessive acetaminophen with this. No more than 3000 mg per day total. She is aware of this. No evidence of abuse or diversion of the medication. oarrs report has been done and okay. there has been weight loss about 20 pounds in the past 6 months. She has not had recent weights and this was not noted. She states her appetite is not great but she has been eating. States she feels well. There have been some bowel changes since she was in the hospital recently. Bowels have been regulating. Last colonoscopy was 11/15 and was okay. .  She is  up-to-date with cardiology and saw urology for microscopic hematuria and leukocytes in the urine. She was placed on  Myrbetric and Estrace cream.myrbetric was  Recently increased to 50 mg. she states this is not  working. She missed her last appointment because she  was in the hospital. I told her she needs to reschedule. She did have CT urogram and cystoscopy performed as well . 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 She is having no abdominal pain. She saw Dr. Shraddha Chong surgeon related to  issues of nipple inversion and discharge. Review of Systems   Constitutional: Positive for unexpected weight change. Negative for activity change, appetite change, chills, diaphoresis, fatigue and fever. HENT: Negative for congestion, ear pain, hearing loss, postnasal drip, rhinorrhea and sinus pain. Respiratory: Positive for shortness of breath.  Negative for cough and wheezing. COPD chronic oxygen use   Cardiovascular: Negative for chest pain, palpitations and leg swelling. Gastrointestinal: Positive for constipation. Negative for abdominal pain, blood in stool, diarrhea, nausea and vomiting. Improving post hospitalization   Endocrine: Negative. Genitourinary: Negative for difficulty urinating, dysuria, hematuria and urgency. Reports frequent UTIs but denies dysuria and frequency does have some incontinence of urine following with urology   Musculoskeletal: Positive for arthralgias. Negative for back pain, gait problem and myalgias. Chronic pain syndrome/back pain--right is limitation of movement, pain, lower back pain and bilateral hip pain/chronic-continues on narcotic pain medication with success. T11-L1 vertebroplasty. Successful. Skin: Negative. Neurological: Negative for dizziness, weakness, light-headedness, numbness and headaches. Hematological: Negative. Psychiatric/Behavioral: Negative for behavioral problems and confusion. REST OF PERTINENT ROS GONE OVER AND WAS NEGATIVE. Current Outpatient Medications:     oxyCODONE-acetaminophen (PERCOCET)  MG per tablet, Take 1 tablet by mouth every 8 hours as needed for Pain for up to 30 days. , Disp: 90 tablet, Rfl: 0    warfarin (COUMADIN) 5 MG tablet, TAKE AS DIRECTED BY YOUR PRESCRIBER COUMADIN, Disp: , Rfl: 5    acetaminophen (TYLENOL ARTHRITIS PAIN) 650 MG extended release tablet, Take 650 mg by mouth every 8 hours as needed for Pain, Disp: , Rfl:     potassium chloride (KLOR-CON) 20 MEQ packet, Take 20 mEq by mouth 2 times daily, Disp: , Rfl:     lisinopril (PRINIVIL;ZESTRIL) 10 MG tablet, Take 1 tablet by mouth every evening (Patient taking differently: Take 10 mg by mouth every evening Take morning of surgery with a sip of water), Disp: 30 tablet, Rfl: 3    atorvastatin (LIPITOR) 40 MG tablet, Take 40 mg by mouth daily , Disp: , Rfl:     vitamin B-12 (CYANOCOBALAMIN) 500 MCG tablet, Take 500 mcg by mouth daily Ld 9-12-17, Disp: , Rfl:     furosemide (LASIX) 20 MG tablet, Take 20 mg by mouth See Admin Instructions , Disp: , Rfl:     isosorbide mononitrate (IMDUR) 30 MG CR tablet, Take 30 mg by mouth daily Take morning of surgery with a sip of water, Disp: , Rfl:   No Known Allergies    Past Medical History:   Diagnosis Date    [facility]ab[facility]) [facility]     Anticoagulant long-term use 5/29/2019    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 ([facility]     CAD (coronary artery disease)     [facility]     Chronic pain syndrome 5/29/2019    HTN (hypertension)     Hx of blood clots 2015    right upper arm    Hyperlipemia      Past Surgical History:   Procedure Laterality Date    BACK SURGERY  2010    [facility] for sciatica, pt unsure    COLONOSCOPY      EYE SURGERY Bilateral     cataracts    FIXATION KYPHOPLASTY  10/02/2017    T12 kyphoplasty Dr Hannah Vergara KYPHOPLASTY  11/20/2017    T11-L1 kypho    HYSTERECTOMY      HYSTERECTOMY, TOTAL ABDOMINAL      TONSILLECTOMY      TRANSESOPHAGEAL ECHOCARDIOGRAM  11/04/15     Family History   Problem Relation Age of Onset    Breast Cancer Mother [de-identified]    Heart Disease Father     Cancer Sister         melanoma     Social History     Socioeconomic History    Marital status:      Spouse name: Not on file    Number of children: Not on file    Years of education: Not on file    Highest education level: Not on file   Occupational History    Not on file   Social Needs    Financial resource strain: Not on file    Food insecurity:     Worry: Not on file     Inability: Not on file    Transportation needs:     Medical: Not on file     Non-medical: Not on file   Tobacco Use    Smoking status: Former Smoker    Smokeless tobacco: Never Used    Tobacco comment: quit about 30 yrs ago   Substance and Sexual Activity    Alcohol use: No    Drug use: No    Skin: Skin is warm and dry. No rash noted. No erythema. Psychiatric: She has a normal mood and affect. Her behavior is normal. Judgment and thought content normal.   Nursing note and vitals reviewed. neurologically appears to be nonfocal grossly intact    Assessment and Plan:  Diagnoses and all orders for this visit:    Anticoagulated on Coumadin  -     POCT INR    Anticoagulant long-term use    Chronic pain syndrome  -     oxyCODONE-acetaminophen (PERCOCET)  MG per tablet; Take 1 tablet by mouth every 8 hours as needed for Pain for up to 30 days. Hyperlipidemia, unspecified hyperlipidemia type  -     Lipid Panel; Future  -     Comprehensive Metabolic Panel; Future  -     CBC Auto Differential; Future    Essential hypertension    Other orders  -     ENROLLMENT FOR ANTICOAGULATION MONITORING      Plan: Same dose Coumadin. INR regular follow-up next month. Check weight again next month. Any further weight loss will need CAT scan abdomen and pelvis. Bowel changes persist may need rescoping again also. Blood work today to monitor disease progression and medication use. Prescription management performed. Fall precautions. Continue follow with cardiology urology has seen surgery as well. Notify us with problems in the interim. Return in about 1 month (around 6/29/2019) for inr and reg visit. Seen By:  Valerie Hamilton MD      *Document was created using voice recognition software. Note was reviewed however may contain grammatical errors. 0 = swallows foods/liquids without difficulty